# Patient Record
Sex: FEMALE | Race: BLACK OR AFRICAN AMERICAN | Employment: OTHER | URBAN - METROPOLITAN AREA
[De-identification: names, ages, dates, MRNs, and addresses within clinical notes are randomized per-mention and may not be internally consistent; named-entity substitution may affect disease eponyms.]

---

## 2017-01-20 RX ORDER — ATENOLOL 100 MG/1
TABLET ORAL
Qty: 90 TAB | Refills: 0 | Status: SHIPPED | OUTPATIENT
Start: 2017-01-20 | End: 2017-04-23 | Stop reason: SDUPTHER

## 2017-02-05 LAB
ALBUMIN SERPL-MCNC: 4 G/DL (ref 3.6–4.8)
ALBUMIN/CREAT UR: 1517.4 MG/G CREAT (ref 0–30)
ALBUMIN/GLOB SERPL: 1.4 {RATIO} (ref 1.1–2.5)
ALP SERPL-CCNC: 116 IU/L (ref 39–117)
ALT SERPL-CCNC: 18 IU/L (ref 0–32)
AST SERPL-CCNC: 17 IU/L (ref 0–40)
BILIRUB SERPL-MCNC: 0.2 MG/DL (ref 0–1.2)
BUN SERPL-MCNC: 50 MG/DL (ref 8–27)
BUN/CREAT SERPL: 17 (ref 11–26)
CALCIUM SERPL-MCNC: 9.1 MG/DL (ref 8.7–10.3)
CHLORIDE SERPL-SCNC: 102 MMOL/L (ref 96–106)
CHOLEST SERPL-MCNC: 184 MG/DL (ref 100–199)
CO2 SERPL-SCNC: 20 MMOL/L (ref 18–29)
CREAT SERPL-MCNC: 2.98 MG/DL (ref 0.57–1)
CREAT UR-MCNC: 82.6 MG/DL
EST. AVERAGE GLUCOSE BLD GHB EST-MCNC: 206 MG/DL
GLOBULIN SER CALC-MCNC: 2.9 G/DL (ref 1.5–4.5)
GLUCOSE SERPL-MCNC: 170 MG/DL (ref 65–99)
HBA1C MFR BLD: 8.8 % (ref 4.8–5.6)
HDLC SERPL-MCNC: 51 MG/DL
INTERPRETATION, 910389: NORMAL
INTERPRETATION: NORMAL
LDLC SERPL CALC-MCNC: 97 MG/DL (ref 0–99)
Lab: NORMAL
MICROALBUMIN UR-MCNC: 1253.4 UG/ML
PDF IMAGE, 910387: NORMAL
POTASSIUM SERPL-SCNC: 4.7 MMOL/L (ref 3.5–5.2)
PROT SERPL-MCNC: 6.9 G/DL (ref 6–8.5)
SODIUM SERPL-SCNC: 142 MMOL/L (ref 134–144)
TRIGL SERPL-MCNC: 181 MG/DL (ref 0–149)
VLDLC SERPL CALC-MCNC: 36 MG/DL (ref 5–40)

## 2017-02-06 ENCOUNTER — OP HISTORICAL/CONVERTED ENCOUNTER (OUTPATIENT)
Dept: OTHER | Age: 62
End: 2017-02-06

## 2017-02-20 ENCOUNTER — OFFICE VISIT (OUTPATIENT)
Dept: ENDOCRINOLOGY | Age: 62
End: 2017-02-20

## 2017-02-20 VITALS
HEART RATE: 68 BPM | TEMPERATURE: 96.7 F | BODY MASS INDEX: 41.04 KG/M2 | OXYGEN SATURATION: 99 % | RESPIRATION RATE: 20 BRPM | SYSTOLIC BLOOD PRESSURE: 133 MMHG | HEIGHT: 62 IN | WEIGHT: 223 LBS | DIASTOLIC BLOOD PRESSURE: 75 MMHG

## 2017-02-20 DIAGNOSIS — I10 ESSENTIAL HYPERTENSION: ICD-10-CM

## 2017-02-20 DIAGNOSIS — E11.65 TYPE 2 DIABETES MELLITUS WITH HYPERGLYCEMIA, WITH LONG-TERM CURRENT USE OF INSULIN (HCC): Primary | ICD-10-CM

## 2017-02-20 DIAGNOSIS — N28.9 RENAL INSUFFICIENCY: ICD-10-CM

## 2017-02-20 DIAGNOSIS — E78.2 MIXED HYPERLIPIDEMIA: ICD-10-CM

## 2017-02-20 DIAGNOSIS — N18.4 CKD (CHRONIC KIDNEY DISEASE), STAGE 4 (SEVERE): ICD-10-CM

## 2017-02-20 DIAGNOSIS — Z79.4 TYPE 2 DIABETES MELLITUS WITH HYPERGLYCEMIA, WITH LONG-TERM CURRENT USE OF INSULIN (HCC): Primary | ICD-10-CM

## 2017-02-20 DIAGNOSIS — N18.30 CKD (CHRONIC KIDNEY DISEASE) STAGE 3, GFR 30-59 ML/MIN (HCC): ICD-10-CM

## 2017-02-20 PROBLEM — N18.9 CKD (CHRONIC KIDNEY DISEASE): Status: ACTIVE | Noted: 2017-02-20

## 2017-02-20 RX ORDER — INSULIN LISPRO 100 [IU]/ML
INJECTION, SOLUTION INTRAVENOUS; SUBCUTANEOUS
Qty: 45 ML | Refills: 4 | Status: SHIPPED | OUTPATIENT
Start: 2017-02-20 | End: 2017-11-29 | Stop reason: SDUPTHER

## 2017-02-20 RX ORDER — NYSTATIN 100000 [USP'U]/G
POWDER TOPICAL
Refills: 5 | COMMUNITY
Start: 2017-01-27

## 2017-02-20 RX ORDER — INSULIN GLARGINE 100 [IU]/ML
34 INJECTION, SOLUTION SUBCUTANEOUS
Qty: 45 ML | Refills: 4 | Status: SHIPPED | OUTPATIENT
Start: 2017-02-20 | End: 2017-08-25 | Stop reason: SDUPTHER

## 2017-02-20 NOTE — PROGRESS NOTES
HISTORY OF PRESENT ILLNESS   Caitlin Sosa is a 59 y.o. female. HPI   F/u for DM 2 after Nov 2016     Her  got diagnosed with stage 4 lung cancer , and she said it is stable   Weight gain of 4  lbs     She has  been taking the basal bolus regimen   She brought  the log   Not many checks except fasting     Not taking Tolerating tanzeum for cost reasons        Review of Systems   Constitutional: Negative. HENT: Negative. Eyes: Negative for pain and redness. Respiratory: Negative. Cardiovascular: Negative for chest pain, palpitations and leg swelling. Gastrointestinal: Negative. Negative for constipation. Genitourinary: Negative. Musculoskeletal: Negative for myalgias. Skin: Negative. Neurological: none   Endo/Heme/Allergies: Negative. Psychiatric/Behavioral: Negative for depression and memory loss. The patient does not have insomnia. Physical Exam   Constitutional: She is oriented to person, place, and time. She appears well-developed and well-nourished. HENT:   Head: Normocephalic. Eyes: Conjunctivae and extraocular motions are normal. Pupils are equal, round, and reactive to light. Neck: Normal range of motion. Neck supple. No JVD present. No tracheal deviation present. No thyromegaly present. Cardiovascular: Normal rate, regular rhythm and normal heart sounds. No murmur heard. Pulmonary/Chest: Breath sounds normal.   Abdominal: Soft. Bowel sounds are normal.   Musculoskeletal: Normal range of motion. Lymphadenopathy:   She has no cervical adenopathy. Neurological: She is alert and oriented to person, place, and time. She has normal reflexes. Skin: Skin is warm. Psychiatric: She has a normal mood and affect.        Lab Results   Component Value Date/Time    Hemoglobin A1c 8.8 02/03/2017 07:10 AM    Hemoglobin A1c 7.6 10/26/2016 07:21 AM    Hemoglobin A1c 8.7 07/28/2016 07:22 AM    Microalb/Creat ratio (ug/mg creat.) 1517.4 02/03/2017 07:10 AM    LDL, calculated 97 02/03/2017 07:10 AM    Creatinine 2.98 02/03/2017 07:10 AM    Hemoglobin A1c, External 8.7 07/29/2016      Lab Results   Component Value Date/Time    Cholesterol, total 184 02/03/2017 07:10 AM    HDL Cholesterol 51 02/03/2017 07:10 AM    LDL, calculated 97 02/03/2017 07:10 AM    Triglyceride 181 02/03/2017 07:10 AM     Lab Results   Component Value Date/Time    AST (SGOT) 17 02/03/2017 07:10 AM    Alk. phosphatase 116 02/03/2017 07:10 AM     Lab Results   Component Value Date/Time    GFR est AA 19 02/03/2017 07:10 AM    GFR est non-AA 16 02/03/2017 07:10 AM    Creatinine 2.98 02/03/2017 07:10 AM    BUN 50 02/03/2017 07:10 AM    Sodium 142 02/03/2017 07:10 AM    Potassium 4.7 02/03/2017 07:10 AM    Chloride 102 02/03/2017 07:10 AM    CO2 20 02/03/2017 07:10 AM          ASSESSMENT and PLAN     1. DM 2 uncontrolled-:      A1C is  8.8 %   From feb 2017  Compared to    7.6 %     From  Nov 2016 compared to  8.7 %    From aug 2016  Compared to  7.9 %    From April 2016  Compared to   7.7 %    From jan 2016  Compared to   7.3 %       From nov 2015 corrected to     7.8 % From  ,    8.2 %    From July 2015   ( finger stick check is 7.6 ) compared to   8.4 %   From feb 2015 compared to   9.8 %   From dec 2014 compared to  9.1 %  From oct 2014 compared to    8 %  from May 2014 compared to 7.5 % from march 2014 compared to 9.8 % from jan 2014 compared to over 11 % from hospital ( going by her )    LOST CONTROL   S/p gastric banding   Off tradjenta 5 mg a day for cost      Because of cost reasons , she is not able to take  tanzeum  50 mg a week   Explained the need for discipline and more checks     Continue  on basal bolus regimen   Checking  Sugars, so far only fastings   She is told to consider afrezza as she is not compliant with her humalog shots   She did nto like that option        2. HTN :  well  controlled,  :  on lotrel , Hydralazine , and atenolol.        3. CKD : stage 4  f/u with Dr. Laisha Wiggins 4. Elevated Liver enzymes- fluctuating    5. Obesity   Body mass index is 40.79 kg/(m^2).   She cannot afford incretins at this time, so tanzeum is optional       She is admonished as to how she has to increase checks and take insulin correctly    > 50 % of time is spent on counseling     F/u in 6 weeks with log

## 2017-02-20 NOTE — PROGRESS NOTES
Wt Readings from Last 3 Encounters:   02/20/17 223 lb (101.2 kg)   11/18/16 219 lb (99.3 kg)   08/09/16 227 lb (103 kg)     Temp Readings from Last 3 Encounters:   02/20/17 96.7 °F (35.9 °C) (Oral)   11/18/16 96.5 °F (35.8 °C) (Oral)   08/09/16 97.1 °F (36.2 °C) (Oral)     BP Readings from Last 3 Encounters:   02/20/17 133/75   11/18/16 136/72   08/09/16 168/77     Pulse Readings from Last 3 Encounters:   02/20/17 68   11/18/16 (!) 57   08/09/16 66     Lab Results   Component Value Date/Time    Hemoglobin A1c 8.8 02/03/2017 07:10 AM    Hemoglobin A1c (POC) 7.3 11/05/2015 02:09 PM    Hemoglobin A1c, External 8.7 07/29/2016     Last Podiatry March 2016  Last Eye exam Oct 2016

## 2017-02-20 NOTE — PATIENT INSTRUCTIONS
Do not skip meals  Do not eat in between meals    Reduce carbs- pasta, rice, potatoes, bread   Do not drink juices or sodas  Donot eat peanut butter     Do not eat sugar free cookies and cakes   Do not eat peaches, grapes, mangoes, pine apples, oranges,  Tangerines       tanzeum   50   mg once a week ( optional by cost )    Check blood sugars immediately before each meal and at bedtime     lantus  34 units  At night     humalog clear  7 units with b-fast,  lunch and dinner       Add  humalog clear insulin as follows  :     151-200 mg  2 units     201- 250 mg 4 units     301- 350 mg 6 units     301-350 mg  8 units    351-400 mg  10 units    401-450 mg  12 units    451-500 mg 14 units       Less than 70 mg- Do not take insulin

## 2017-02-20 NOTE — MR AVS SNAPSHOT
Visit Information Date & Time Provider Department Dept. Phone Encounter #  
 2/20/2017  9:15 AM Priscilla Spicer MD [facility] [phone] [id number] Follow-up Instructions Return in about 4 weeks (around 3/20/2017). Upcoming Health Maintenance Date Due Hepatitis C Screening 1955 Pneumococcal 19-64 Highest Risk (1 of 3 - PCV13) 6/19/1972 DTaP/Tdap/Td series (1 - Tdap) 6/19/1974 PAP AKA CERVICAL CYTOLOGY 6/19/1974 BREAST CANCER SCRN MAMMOGRAM 6/19/2003 FOBT Q 1 YEAR AGE 50-75 6/19/2003 ZOSTER VACCINE AGE 60> 6/19/2013 FOOT EXAM Q1 3/12/2015 EYE EXAM RETINAL OR DILATED Q1 10/6/2015 HEMOGLOBIN A1C Q6M 8/3/2017 MICROALBUMIN Q1 2/3/2018 LIPID PANEL Q1 2/3/2018 Allergies as of 2/20/2017  Review Complete On: 2/20/2017 By: Priscilla Spicer MD  
 No Known Allergies Current Immunizations  Never Reviewed No immunizations on file. Not reviewed this visit You Were Diagnosed With   
  
 Codes Comments Type 2 diabetes mellitus with hyperglycemia, with long-term current use of insulin (HCC)    -  Primary ICD-10-CM: E11.65, Z79.4 ICD-9-CM: 250.00, 790.29, V58.67 Renal insufficiency     ICD-10-CM: N28.9 ICD-9-CM: 593.9 Essential hypertension     ICD-10-CM: I10 
ICD-9-CM: 401.9 CKD (chronic kidney disease), stage 4 (severe) (HCC)     ICD-10-CM: N18.4 ICD-9-CM: 029. 4 Vitals BP Pulse Temp Resp Height(growth percentile) Weight(growth percentile) 133/75 68 96.7 °F (35.9 °C) (Oral) 20 5' 2\" (1.575 m) 223 lb (101.2 kg) SpO2 BMI OB Status Smoking Status 99% 40.79 kg/m2 Postmenopausal Never Smoker BMI and BSA Data Body Mass Index Body Surface Area 40.79 kg/m 2 2.1 m 2 Preferred Pharmacy Pharmacy Name Phone [address] [facility], [facility] [address] ([phone] Your Updated Medication List  
  
 This list is accurate as of: 2/20/17  9:49 AM.  Always use your most recent med list.  
  
  
  
  
 albiglutide 50 mg/0.5 mL injector pen Commonly known as:  TANZEUM  
50 mg by SubCUTAneous route every seven (7) days. STOP 30 MG DOSE. To use with savings card and voucher  
  
 amLODIPine-benazepril 10-40 mg per capsule Commonly known as:  Mihai Dubin Take 1 Cap by mouth daily. aspirin delayed-release 81 mg tablet Take  by mouth daily. atenolol 100 mg tablet Commonly known as:  TENORMIN  
TAKE 1 TABLET BY MOUTH DAILY  
  
 calcitRIOL 0.25 mcg capsule Commonly known as:  ROCALTROL TK 1 C PO D  
  
 CLARITIN 10 mg tablet Generic drug:  loratadine Take 10 mg by mouth daily. CRESTOR 10 mg tablet Generic drug:  rosuvastatin TAKE 1 TABLET NIGHTLY  
  
 furosemide 40 mg tablet Commonly known as:  LASIX Take 40 mg by mouth every other day. glucose blood VI test strips strip Commonly known as:  ONETOUCH ULTRA TEST Test 4 times daily. DX CODE 250.00  
  
 hydrALAZINE 25 mg tablet Commonly known as:  APRESOLINE Take 1 Tab by mouth three (3) times daily. * insulin glargine 100 unit/mL (3 mL) pen Commonly known as:  LANTUS SOLOSTAR  
sample * insulin glargine 100 unit/mL (3 mL) pen Commonly known as:  LANTUS SOLOSTAR  
34 Units by SubCUTAneous route nightly. insulin lispro 100 unit/mL kwikpen Commonly known as:  HUMALOG Inject 7 units with breakfast, lunch, and dinner. Plus sliding scale. STOP MIXED INSULINS Insulin Needles (Disposable) 32 gauge x 5/32\" Ndle Commonly known as:  Mirian Pen Needle Use 4 times daily. Dx code E11.65  
  
 insulin syringe-needle U-100 1 mL 31 gauge x 15/64\" Syrg Commonly known as:  BD INSULIN SYRINGE ULTRA-FINE  
1 Syringe by Does Not Apply route daily. Use once daily  
  
 isosorbide mononitrate ER 30 mg tablet Commonly known as:  IMDUR Take 60 mg by mouth two (2) times a day. Lancets Misc Commonly known as: One Touch Karron Feller Test 4 times daily. DX CODE 250.00 NYSTOP powder Generic drug:  nystatin CHANDRAKANT UTD BID EXTERNALLY  
  
 ONE-A-DAY WOMENS FORMULA PO Take  by mouth. VITAMIN D2 PO Take  by mouth. * Notice: This list has 2 medication(s) that are the same as other medications prescribed for you. Read the directions carefully, and ask your doctor or other care provider to review them with you. Follow-up Instructions Return in about 4 weeks (around 3/20/2017). Patient Instructions Do not skip meals Do not eat in between meals Reduce carbs- pasta, rice, potatoes, bread Do not drink juices or sodas Donot eat peanut butter Do not eat sugar free cookies and cakes Do not eat peaches, grapes, mangoes, pine apples, oranges,  Tangerines  
 
 
tanzeum   50   mg once a week ( optional by cost ) Check blood sugars immediately before each meal and at bedtime 
 
 lantus  34 units  At night  
 
humalog clear  7 units with b-fast,  lunch and dinner Add  humalog clear insulin as follows  :  
 
151-200 mg  2 units 201- 250 mg 4 units 301- 350 mg 6 units 301-350 mg  8 units 351-400 mg  10 units 401-450 mg  12 units 451-500 mg 14 units Less than 70 mg- Do not take insulin Introducing [facility]! Dear Alivia Saenz: Thank you for requesting a [employer] account. Our records indicate that you already have an active [employer] account. You can access your account anytime at [email or web address] Did you know that you can access your hospital and ER discharge instructions at any time in [employer]? You can also review all of your test results from your hospital stay or ER visit. Additional Information If you have questions, please visit the Frequently Asked Questions section of the [employer] website at [email or web address] Remember, MyChart is NOT to be used for urgent needs. For medical emergencies, dial 911. Now available from your iPhone and Android! Please provide this summary of care documentation to your next provider. Your primary care clinician is listed as Carmella Mccullough. If you have any questions after today's visit, please call [phone].

## 2017-03-20 ENCOUNTER — OFFICE VISIT (OUTPATIENT)
Dept: ENDOCRINOLOGY | Age: 62
End: 2017-03-20

## 2017-03-20 VITALS
TEMPERATURE: 97.1 F | SYSTOLIC BLOOD PRESSURE: 184 MMHG | WEIGHT: 224 LBS | HEIGHT: 62 IN | BODY MASS INDEX: 41.22 KG/M2 | OXYGEN SATURATION: 99 % | HEART RATE: 70 BPM | RESPIRATION RATE: 20 BRPM | DIASTOLIC BLOOD PRESSURE: 85 MMHG

## 2017-03-20 DIAGNOSIS — I10 ESSENTIAL HYPERTENSION: ICD-10-CM

## 2017-03-20 DIAGNOSIS — R80.9 PROTEINURIA: ICD-10-CM

## 2017-03-20 DIAGNOSIS — N28.9 RENAL INSUFFICIENCY: ICD-10-CM

## 2017-03-20 NOTE — PATIENT INSTRUCTIONS
Do not skip meals  Do not eat in between meals    Reduce carbs- pasta, rice, potatoes, bread   Do not drink juices or sodas  Donot eat peanut butter     Do not eat sugar free cookies and cakes   Do not eat peaches, grapes, mangoes, pine apples, oranges,  Tangerines       tanzeum   50   mg once a week ( optional by cost )    Check blood sugars immediately before each meal and at bedtime    Increase  lantus  36 units  At night     Increase humalog clear 8 units with b-fast,  lunch and dinner       Add  humalog clear insulin as follows  :     151-200 mg  2 units     201- 250 mg 4 units     301- 350 mg 6 units     301-350 mg  8 units    351-400 mg  10 units    401-450 mg  12 units    451-500 mg 14 units       Less than 70 mg- Do not take insulin

## 2017-03-20 NOTE — PROGRESS NOTES
Wt Readings from Last 3 Encounters:   03/20/17 224 lb (101.6 kg)   02/20/17 223 lb (101.2 kg)   11/18/16 219 lb (99.3 kg)     Temp Readings from Last 3 Encounters:   03/20/17 97.1 °F (36.2 °C) (Oral)   02/20/17 96.7 °F (35.9 °C) (Oral)   11/18/16 96.5 °F (35.8 °C) (Oral)     BP Readings from Last 3 Encounters:   03/20/17 184/85   02/20/17 133/75   11/18/16 136/72     Pulse Readings from Last 3 Encounters:   03/20/17 70   02/20/17 68   11/18/16 (!) 57     Lab Results   Component Value Date/Time    Hemoglobin A1c 8.8 02/03/2017 07:10 AM    Hemoglobin A1c (POC) 7.3 11/05/2015 02:09 PM    Hemoglobin A1c, External 8.7 07/29/2016     Last Podiatry March 2016  Last Eye exam Oct 2016

## 2017-03-20 NOTE — PROGRESS NOTES
HISTORY OF PRESENT ILLNESS   Caitlin Childs is a 59 y.o. female. HPI   F/u for DM 2 after feb 20 2016     Her  got diagnosed with stage 4 lung cancer , and she said it is stable   Weight gain of 4  lbs     She has  been taking the basal bolus regimen   She brought  the log   Not many checks except fasting     Not taking Tolerating tanzeum for cost reasons        Review of Systems   Constitutional: Negative. HENT: Negative. Eyes: Negative for pain and redness. Respiratory: Negative. Cardiovascular: Negative for chest pain, palpitations and leg swelling. Gastrointestinal: Negative. Negative for constipation. Genitourinary: Negative. Musculoskeletal: Negative for myalgias. Skin: Negative. Neurological: none   Endo/Heme/Allergies: Negative. Psychiatric/Behavioral: Negative for depression and memory loss. The patient does not have insomnia. Physical Exam   Constitutional: She is oriented to person, place, and time. She appears well-developed and well-nourished. HENT:   Head: Normocephalic. Eyes: Conjunctivae and extraocular motions are normal. Pupils are equal, round, and reactive to light. Neck: Normal range of motion. Neck supple. No JVD present. No tracheal deviation present. No thyromegaly present. Cardiovascular: Normal rate, regular rhythm and normal heart sounds. No murmur heard. Pulmonary/Chest: Breath sounds normal.   Abdominal: Soft. Bowel sounds are normal.   Musculoskeletal: Normal range of motion. Lymphadenopathy:   She has no cervical adenopathy. Neurological: She is alert and oriented to person, place, and time. She has normal reflexes. Skin: Skin is warm. Psychiatric: She has a normal mood and affect.        Lab Results   Component Value Date/Time    Hemoglobin A1c 8.8 02/03/2017 07:10 AM    Hemoglobin A1c 7.6 10/26/2016 07:21 AM    Hemoglobin A1c 8.7 07/28/2016 07:22 AM    Microalb/Creat ratio (ug/mg creat.) 1517.4 02/03/2017 07:10 AM    LDL, calculated 97 02/03/2017 07:10 AM    Creatinine 2.98 02/03/2017 07:10 AM    Hemoglobin A1c, External 8.7 07/29/2016      Lab Results   Component Value Date/Time    Cholesterol, total 184 02/03/2017 07:10 AM    HDL Cholesterol 51 02/03/2017 07:10 AM    LDL, calculated 97 02/03/2017 07:10 AM    Triglyceride 181 02/03/2017 07:10 AM     Lab Results   Component Value Date/Time    AST (SGOT) 17 02/03/2017 07:10 AM    Alk. phosphatase 116 02/03/2017 07:10 AM     Lab Results   Component Value Date/Time    GFR est AA 19 02/03/2017 07:10 AM    GFR est non-AA 16 02/03/2017 07:10 AM    Creatinine 2.98 02/03/2017 07:10 AM    BUN 50 02/03/2017 07:10 AM    Sodium 142 02/03/2017 07:10 AM    Potassium 4.7 02/03/2017 07:10 AM    Chloride 102 02/03/2017 07:10 AM    CO2 20 02/03/2017 07:10 AM          ASSESSMENT and PLAN     1. DM 2 uncontrolled-:      A1C is  8.8 %   From feb 2017  Compared to    7.6 %     From  Nov 2016 compared to  8.7 %    From aug 2016  Compared to  7.9 %    From April 2016  Compared to   7.7 %    From jan 2016  Compared to   7.3 %       From nov 2015 corrected to     7.8 % From  ,    8.2 %    From July 2015   ( finger stick check is 7.6 ) compared to   8.4 %   From feb 2015 compared to   9.8 %   From dec 2014 compared to  9.1 %  From oct 2014 compared to    8 %  from May 2014 compared to 7.5 % from march 2014 compared to 9.8 % from jan 2014 compared to over 11 % from hospital ( going by her )    Hoping to get better control on basal bolus   She has some high sugars, meter not timed       S/p gastric banding   Off tradjenta 5 mg a day for cost      Because of cost reasons , she is not able to take  tanzeum  50 mg a week   Explained the need for discipline and more checks     Continue  on basal bolus regimen   Checking  Sugars, improved       She is told to consider afrezza as she is not compliant with her humalog shots   She did nto like that option        2.  HTN :  Not well  controlled,  :  on lotrel , Hydralazine , and atenolol. 3. CKD : stage 4  f/u with Dr. Laura Prado     4. Elevated Liver enzymes- fluctuating    5. Obesity   Body mass index is 40.97 kg/(m^2).   She cannot afford incretins at this time, so tanzeum is optional       She is admonished as to how she has to increase checks and take insulin correctly    > 50 % of time is spent on counseling     F/u in 6 weeks with log and labs   Might need carb training

## 2017-03-20 NOTE — MR AVS SNAPSHOT
Visit Information Date & Time Provider Department Dept. Phone Encounter #  
 3/20/2017  3:15 PM Derrick Peterson MD [facility] Endocr[facility] [phone] [id number] Follow-up Instructions Return in about 7 weeks (around 5/8/2017). Upcoming Health Maintenance Date Due Hepatitis C Screening 1955 Pneumococcal 19-64 Highest Risk (1 of 3 - PCV13) 6/19/1972 DTaP/Tdap/Td series (1 - Tdap) 6/19/1974 PAP AKA CERVICAL CYTOLOGY 6/19/1974 BREAST CANCER SCRN MAMMOGRAM 6/19/2003 FOBT Q 1 YEAR AGE 50-75 6/19/2003 ZOSTER VACCINE AGE 60> 6/19/2013 FOOT EXAM Q1 3/12/2015 EYE EXAM RETINAL OR DILATED Q1 10/6/2015 HEMOGLOBIN A1C Q6M 8/3/2017 MICROALBUMIN Q1 2/3/2018 LIPID PANEL Q1 2/3/2018 Allergies as of 3/20/2017  Review Complete On: 3/20/2017 By: Daphne Cardenas LPN No Known Allergies Current Immunizations  Never Reviewed No immunizations on file. Not reviewed this visit You Were Diagnosed With   
  
 Codes Comments Uncontrolled type 2 diabetes mellitus with stage 4 chronic kidney disease, with long-term current use of insulin (HCC)    -  Primary ICD-10-CM: E11.22, E11.65, N18.4, Z79.4 ICD-9-CM: 250.52, 585.4, V58.67 Proteinuria     ICD-10-CM: R80.9 ICD-9-CM: 791.0 Renal insufficiency     ICD-10-CM: N28.9 ICD-9-CM: 593.9 Essential hypertension     ICD-10-CM: I10 
ICD-9-CM: 401.9 Vitals BP Pulse Temp Resp Height(growth percentile) Weight(growth percentile) 184/85 70 97.1 °F (36.2 °C) (Oral) 20 5' 2\" (1.575 m) 224 lb (101.6 kg) SpO2 BMI OB Status Smoking Status 99% 40.97 kg/m2 Postmenopausal Never Smoker BMI and BSA Data Body Mass Index Body Surface Area 40.97 kg/m 2 2.11 m 2 Preferred Pharmacy Pharmacy Name Phone  [address] [phone] Your Updated Medication List  
  
   
 This list is accurate as of: 3/20/17  4:13 PM.  Always use your most recent med list.  
  
  
  
  
 albiglutide 50 mg/0.5 mL injector pen Commonly known as:  TANZEUM  
50 mg by SubCUTAneous route every seven (7) days. STOP 30 MG DOSE. To use with savings card and voucher  
  
 amLODIPine-benazepril 10-40 mg per capsule Commonly known as:  Orlando Holster Take 1 Cap by mouth daily. aspirin delayed-release 81 mg tablet Take  by mouth daily. atenolol 100 mg tablet Commonly known as:  TENORMIN  
TAKE 1 TABLET BY MOUTH DAILY  
  
 calcitRIOL 0.25 mcg capsule Commonly known as:  ROCALTROL TK 1 C PO D  
  
 CLARITIN 10 mg tablet Generic drug:  loratadine Take 10 mg by mouth daily. CRESTOR 10 mg tablet Generic drug:  rosuvastatin TAKE 1 TABLET NIGHTLY  
  
 furosemide 40 mg tablet Commonly known as:  LASIX Take 40 mg by mouth every other day. glucose blood VI test strips strip Commonly known as:  ONETOUCH ULTRA TEST Test 4 times daily. DX CODE 250.00  
  
 hydrALAZINE 25 mg tablet Commonly known as:  APRESOLINE Take 1 Tab by mouth three (3) times daily. * insulin glargine 100 unit/mL (3 mL) pen Commonly known as:  LANTUS SOLOSTAR  
sample * insulin glargine 100 unit/mL (3 mL) pen Commonly known as:  LANTUS SOLOSTAR  
34 Units by SubCUTAneous route nightly. insulin lispro 100 unit/mL kwikpen Commonly known as:  HUMALOG Inject 7 units with breakfast, lunch, and dinner. Plus sliding scale. STOP MIXED INSULINS Insulin Needles (Disposable) 32 gauge x 5/32\" Ndle Commonly known as:  Mirian Pen Needle Use 4 times daily. Dx code E11.65  
  
 insulin syringe-needle U-100 1 mL 31 gauge x 15/64\" Syrg Commonly known as:  BD INSULIN SYRINGE ULTRA-FINE  
1 Syringe by Does Not Apply route daily. Use once daily  
  
 isosorbide mononitrate ER 30 mg tablet Commonly known as:  IMDUR Take 60 mg by mouth two (2) times a day. Lancets Misc Commonly known as: One Touch Leslie Llanos Test 4 times daily. DX CODE 250.00 NYSTOP powder Generic drug:  nystatin CHANDRAKANT UTD BID EXTERNALLY  
  
 ONE-A-DAY WOMENS FORMULA PO Take  by mouth. VITAMIN D2 PO Take  by mouth. * Notice: This list has 2 medication(s) that are the same as other medications prescribed for you. Read the directions carefully, and ask your doctor or other care provider to review them with you. Follow-up Instructions Return in about 7 weeks (around 5/8/2017). Patient Instructions Do not skip meals Do not eat in between meals Reduce carbs- pasta, rice, potatoes, bread Do not drink juices or sodas Donot eat peanut butter Do not eat sugar free cookies and cakes Do not eat peaches, grapes, mangoes, pine apples, oranges,  Tangerines  
 
 
tanzeum   50   mg once a week ( optional by cost ) Check blood sugars immediately before each meal and at bedtime Increase  lantus  36 units  At night Increase humalog clear 8 units with b-fast,  lunch and dinner Add  humalog clear insulin as follows  :  
 
151-200 mg  2 units 201- 250 mg 4 units 301- 350 mg 6 units 301-350 mg  8 units 351-400 mg  10 units 401-450 mg  12 units 451-500 mg 14 units Less than 70 mg- Do not take insulin Introducing [facility]! Dear Valeen Hammans: Thank you for requesting a [employer] account. Our records indicate that you already have an active [employer] account. You can access your account anytime at [email or web address] Did you know that you can access your hospital and ER discharge instructions at any time in [employer]? You can also review all of your test results from your hospital stay or ER visit. Additional Information If you have questions, please visit the Frequently Asked Questions section of the [employer] website at [email or web address] Remember, MyChart is NOT to be used for urgent needs. For medical emergencies, dial 911. Now available from your iPhone and Android! Please provide this summary of care documentation to your next provider. Your primary care clinician is listed as Hansa Frey. If you have any questions after today's visit, please call [phone].

## 2017-05-02 ENCOUNTER — IP HISTORICAL/CONVERTED ENCOUNTER (OUTPATIENT)
Dept: OTHER | Age: 62
End: 2017-05-02

## 2017-07-18 ENCOUNTER — TELEPHONE (OUTPATIENT)
Dept: ENDOCRINOLOGY | Age: 62
End: 2017-07-18

## 2017-07-18 NOTE — TELEPHONE ENCOUNTER
Patient called stated they sent a my chart message needing a [facility] lab slip mailed but still has not received it. Can you please mail or fax the slip so that she may get her labs drawn.  Thank you

## 2017-08-18 LAB
ALBUMIN SERPL-MCNC: 4.1 G/DL (ref 3.6–4.8)
ALBUMIN/CREAT UR: 737.6 MG/G CREAT (ref 0–30)
ALBUMIN/GLOB SERPL: 1.5 {RATIO} (ref 1.2–2.2)
ALP SERPL-CCNC: 84 IU/L (ref 39–117)
ALT SERPL-CCNC: 16 IU/L (ref 0–32)
AST SERPL-CCNC: 26 IU/L (ref 0–40)
BILIRUB SERPL-MCNC: 0.2 MG/DL (ref 0–1.2)
BUN SERPL-MCNC: 54 MG/DL (ref 8–27)
BUN/CREAT SERPL: 17 (ref 12–28)
CALCIUM SERPL-MCNC: 9.6 MG/DL (ref 8.7–10.3)
CHLORIDE SERPL-SCNC: 102 MMOL/L (ref 96–106)
CHOLEST SERPL-MCNC: 157 MG/DL (ref 100–199)
CO2 SERPL-SCNC: 20 MMOL/L (ref 18–29)
CREAT SERPL-MCNC: 3.23 MG/DL (ref 0.57–1)
CREAT UR-MCNC: 49.7 MG/DL
EST. AVERAGE GLUCOSE BLD GHB EST-MCNC: 166 MG/DL
GLOBULIN SER CALC-MCNC: 2.8 G/DL (ref 1.5–4.5)
GLUCOSE SERPL-MCNC: 120 MG/DL (ref 65–99)
HBA1C MFR BLD: 7.4 % (ref 4.8–5.6)
HDLC SERPL-MCNC: 46 MG/DL
INTERPRETATION, 910389: NORMAL
INTERPRETATION: NORMAL
LDLC SERPL CALC-MCNC: 81 MG/DL (ref 0–99)
Lab: NORMAL
MICROALBUMIN UR-MCNC: 366.6 UG/ML
PDF IMAGE, 910387: NORMAL
POTASSIUM SERPL-SCNC: 4 MMOL/L (ref 3.5–5.2)
PROT SERPL-MCNC: 6.9 G/DL (ref 6–8.5)
SODIUM SERPL-SCNC: 141 MMOL/L (ref 134–144)
TRIGL SERPL-MCNC: 148 MG/DL (ref 0–149)
VLDLC SERPL CALC-MCNC: 30 MG/DL (ref 5–40)

## 2017-08-25 ENCOUNTER — OFFICE VISIT (OUTPATIENT)
Dept: ENDOCRINOLOGY | Age: 62
End: 2017-08-25

## 2017-08-25 VITALS
TEMPERATURE: 96.5 F | HEIGHT: 62 IN | DIASTOLIC BLOOD PRESSURE: 67 MMHG | RESPIRATION RATE: 16 BRPM | OXYGEN SATURATION: 98 % | SYSTOLIC BLOOD PRESSURE: 131 MMHG | BODY MASS INDEX: 41.51 KG/M2 | HEART RATE: 67 BPM | WEIGHT: 225.6 LBS

## 2017-08-25 DIAGNOSIS — I10 ESSENTIAL HYPERTENSION: ICD-10-CM

## 2017-08-25 DIAGNOSIS — N18.4 TYPE 2 DIABETES MELLITUS WITH STAGE 4 CHRONIC KIDNEY DISEASE, WITH LONG-TERM CURRENT USE OF INSULIN (HCC): Primary | ICD-10-CM

## 2017-08-25 DIAGNOSIS — N18.30 CKD (CHRONIC KIDNEY DISEASE) STAGE 3, GFR 30-59 ML/MIN (HCC): ICD-10-CM

## 2017-08-25 DIAGNOSIS — N28.9 RENAL INSUFFICIENCY: ICD-10-CM

## 2017-08-25 DIAGNOSIS — Z79.4 UNCONTROLLED TYPE 2 DIABETES MELLITUS WITH HYPERGLYCEMIA, WITH LONG-TERM CURRENT USE OF INSULIN (HCC): ICD-10-CM

## 2017-08-25 DIAGNOSIS — Z79.4 TYPE 2 DIABETES MELLITUS WITH STAGE 4 CHRONIC KIDNEY DISEASE, WITH LONG-TERM CURRENT USE OF INSULIN (HCC): Primary | ICD-10-CM

## 2017-08-25 DIAGNOSIS — E66.01 MORBID OBESITY, UNSPECIFIED OBESITY TYPE (HCC): ICD-10-CM

## 2017-08-25 DIAGNOSIS — E11.65 UNCONTROLLED TYPE 2 DIABETES MELLITUS WITH HYPERGLYCEMIA, WITH LONG-TERM CURRENT USE OF INSULIN (HCC): ICD-10-CM

## 2017-08-25 DIAGNOSIS — E11.22 TYPE 2 DIABETES MELLITUS WITH STAGE 4 CHRONIC KIDNEY DISEASE, WITH LONG-TERM CURRENT USE OF INSULIN (HCC): Primary | ICD-10-CM

## 2017-08-25 DIAGNOSIS — N18.4 CKD (CHRONIC KIDNEY DISEASE), STAGE 4 (SEVERE): ICD-10-CM

## 2017-08-25 RX ORDER — INSULIN GLARGINE 100 [IU]/ML
40 INJECTION, SOLUTION SUBCUTANEOUS
Qty: 45 ML | Refills: 4 | Status: SHIPPED | OUTPATIENT
Start: 2017-08-25 | End: 2018-03-29 | Stop reason: SDUPTHER

## 2017-08-25 NOTE — PROGRESS NOTES
HISTORY OF PRESENT ILLNESS   Caitlin Acosta is a 57 y.o. female. HPI   F/u for DM 2 after March 20 2017    Her  got diagnosed with stage 4 lung cancer , and she said it is stable   Weight gain of 1  lbs     She has  been taking the basal bolus regimen   She brought  the log   Not many checks except fasting       Hospitalized in May for renal issues     Not taking Tolerating tanzeum for cost reasons        Review of Systems   Constitutional: Negative. HENT: Negative. Eyes: Negative for pain and redness. Respiratory: Negative. Cardiovascular: Negative for chest pain, palpitations and leg swelling. Gastrointestinal: Negative. Negative for constipation. Genitourinary: Negative. Musculoskeletal: Negative for myalgias. Skin: Negative. Neurological: none   Endo/Heme/Allergies: Negative. Psychiatric/Behavioral: Negative for depression and memory loss. The patient does not have insomnia. Physical Exam   Constitutional: She is oriented to person, place, and time. She appears well-developed and well-nourished. HENT:   Head: Normocephalic. Eyes: Conjunctivae and extraocular motions are normal. Pupils are equal, round, and reactive to light. Neck: Normal range of motion. Neck supple. No JVD present. No tracheal deviation present. No thyromegaly present. Cardiovascular: Normal rate, regular rhythm and normal heart sounds. No murmur heard. Pulmonary/Chest: Breath sounds normal.   Abdominal: Soft. Bowel sounds are normal.   Musculoskeletal: Normal range of motion. Lymphadenopathy:   She has no cervical adenopathy. Neurological: She is alert and oriented to person, place, and time. She has normal reflexes. Skin: Skin is warm. Psychiatric: She has a normal mood and affect.        Lab Results   Component Value Date/Time    Hemoglobin A1c 7.4 08/17/2017 07:23 AM    Hemoglobin A1c 8.8 02/03/2017 07:10 AM    Hemoglobin A1c 7.6 10/26/2016 07:21 AM    Microalb/Creat ratio (ug/mg creat.) 737.6 08/17/2017 07:23 AM    LDL, calculated 81 08/17/2017 07:23 AM    Creatinine 3.23 08/17/2017 07:23 AM    Hemoglobin A1c, External 8.7 07/29/2016      Lab Results   Component Value Date/Time    Cholesterol, total 157 08/17/2017 07:23 AM    HDL Cholesterol 46 08/17/2017 07:23 AM    LDL, calculated 81 08/17/2017 07:23 AM    Triglyceride 148 08/17/2017 07:23 AM     Lab Results   Component Value Date/Time    AST (SGOT) 26 08/17/2017 07:23 AM    Alk. phosphatase 84 08/17/2017 07:23 AM     Lab Results   Component Value Date/Time    GFR est AA 17 08/17/2017 07:23 AM    GFR est non-AA 14 08/17/2017 07:23 AM    Creatinine 3.23 08/17/2017 07:23 AM    BUN 54 08/17/2017 07:23 AM    Sodium 141 08/17/2017 07:23 AM    Potassium 4.0 08/17/2017 07:23 AM    Chloride 102 08/17/2017 07:23 AM    CO2 20 08/17/2017 07:23 AM          ASSESSMENT and PLAN     1. DM 2 uncontrolled-:      A1C is   7.4 %   From     Aug 2017     Compared to  8.8 %   From feb 2017  Compared to    7.6 %     From  Nov 2016 compared to  8.7 %    From aug 2016  Compared to  7.9 %    From April 2016  Compared to   7.7 %    From jan 2016  Compared to   7.3 %       From nov 2015 corrected to     7.8 % From  ,    8.2 %    From July 2015   ( finger stick check is 7.6 ) compared to   8.4 %   From feb 2015 compared to   9.8 %   From dec 2014 compared to  9.1 %  From oct 2014 compared to    8 %  from May 2014 compared to 7.5 % from march 2014 compared to 9.8 % from jan 2014 compared to over 11 % from hospital ( going by her )    Glad to see some improvement in glycemic control   S/p gastric banding   Off tradjenta 5 mg a day for cost      Because of cost reasons , she is not able to take  tanzeum  50 mg a week   Explained the need for discipline and more checks     Continue  on basal bolus regimen   Checking  Sugars, improved         2. HTN :  Not well  controlled,  :  on lotrel , Hydralazine , and atenolol.        3. CKD : stage 4  f/u with Dr. Declan Rogers 4. Elevated Liver enzymes- fluctuating    5. Obesity   Body mass index is 41.26 kg/(m^2).   She cannot afford incretins at this time, so tanzeum is optional           > 50 % of time is spent on counseling     F/u in 12 weeks with log and labs   Might need carb training

## 2017-08-25 NOTE — PATIENT INSTRUCTIONS
Do not skip meals  Do not eat in between meals    Reduce carbs- pasta, rice, potatoes, bread   Do not drink juices or sodas  Donot eat peanut butter     Do not eat sugar free cookies and cakes   Do not eat peaches, grapes, mangoes, pine apples, oranges,  Tangerines       tanzeum   50   mg once a week ( optional by cost )    Check blood sugars immediately before each meal and at bedtime    lantus  40 units  At night     Increase humalog clear 8 units with b-fast,  lunch and dinner       Add  humalog clear insulin as follows  :     151-200 mg  2 units     201- 250 mg 4 units     301- 350 mg 6 units     301-350 mg  8 units    351-400 mg  10 units    401-450 mg  12 units    451-500 mg 14 units       Less than 70 mg- Do not take insulin

## 2017-08-25 NOTE — MR AVS SNAPSHOT
Visit Information Date & Time Provider Department Dept. Phone Encounter #  
 8/25/2017  2:45 PM Roxy Bateman MD [facility] Diabetes & Endocrinology [phone] [id number] Follow-up Instructions Return in about 3 months (around 11/25/2017). Upcoming Health Maintenance Date Due Hepatitis C Screening 1955 Pneumococcal 19-64 Medium Risk (1 of 1 - PPSV23) 6/19/1972 DTaP/Tdap/Td series (1 - Tdap) 6/19/1974 PAP AKA CERVICAL CYTOLOGY 6/19/1974 BREAST CANCER SCRN MAMMOGRAM 6/19/2003 FOBT Q 1 YEAR AGE 50-75 6/19/2003 ZOSTER VACCINE AGE 60> 4/19/2013 FOOT EXAM Q1 3/12/2015 EYE EXAM RETINAL OR DILATED Q1 10/6/2015 INFLUENZA AGE 9 TO ADULT 8/1/2017 HEMOGLOBIN A1C Q6M 2/17/2018 MICROALBUMIN Q1 8/17/2018 LIPID PANEL Q1 8/17/2018 Allergies as of 8/25/2017  Review Complete On: 8/25/2017 By: Roxy Bateman MD  
 No Known Allergies Current Immunizations  Never Reviewed No immunizations on file. Not reviewed this visit You Were Diagnosed With   
  
 Codes Comments Type 2 diabetes mellitus with stage 4 chronic kidney disease, with long-term current use of insulin (HCC)    -  Primary ICD-10-CM: E11.22, N18.4, Z79.4 ICD-9-CM: 250.40, 585.4, V58.67 Morbid obesity, unspecified [facility] type ([facility]     ICD-10-CM: E66.01 
ICD-9-CM: 278.01 Renal insufficiency     ICD-10-CM: N28.9 ICD-9-CM: 593.9 Essential hypertension     ICD-10-CM: I10 
ICD-9-CM: 401.9 Vitals BP Pulse Temp Resp Height(growth percentile) Weight(growth percentile) 131/67 (BP 1 Location: Left arm, BP Patient Position: Sitting) 67 96.5 °F (35.8 °C) (Oral) 16 5' 2\" (1.575 m) 225 lb 9.6 oz (102.3 kg) SpO2 BMI OB Status Smoking Status 98% 41.26 kg/m2 Postmenopausal Never Smoker BMI and BSA Data Body Mass Index Body Surface Area  
 41.26 kg/m 2 2.12 m 2 Preferred Pharmacy Pharmacy Name Phone [address] [facility] [address] [facility] OF [address] [facility] ([address] [phone] Your Updated Medication List  
  
   
This list is accurate as of: 8/25/17  3:37 PM.  Always use your most recent med list.  
  
  
  
  
 albiglutide 50 mg/0.5 mL injector pen Commonly known as:  TANZEUM  
50 mg by SubCUTAneous route every seven (7) days. STOP 30 MG DOSE. To use with savings card and voucher  
  
 amLODIPine-benazepril 10-40 mg per capsule Commonly known as:  LOTREL  
TAKE 1 CAPSULE DAILY  
  
 aspirin delayed-release 81 mg tablet Take  by mouth daily. atenolol 100 mg tablet Commonly known as:  TENORMIN  
TAKE 1 TABLET BY MOUTH DAILY  
  
 calcitRIOL 0.25 mcg capsule Commonly known as:  ROCALTROL TK 1 C PO D  
  
 CLARITIN 10 mg tablet Generic drug:  loratadine Take 10 mg by mouth daily. furosemide 40 mg tablet Commonly known as:  LASIX Take 40 mg by mouth three (3) times daily. glucose blood VI test strips strip Commonly known as:  ONETOUCH ULTRA TEST Test 4 times daily. DX CODE 250.00  
  
 hydrALAZINE 25 mg tablet Commonly known as:  APRESOLINE Take 1 Tab by mouth three (3) times daily. * insulin glargine 100 unit/mL (3 mL) Inpn Commonly known as:  LANTUS,BASAGLAR  
sample * insulin glargine 100 unit/mL (3 mL) Inpn Commonly known as:  LANTUS SOLOSTAR  
34 Units by SubCUTAneous route nightly. insulin lispro 100 unit/mL kwikpen Commonly known as:  HUMALOG Inject 7 units with breakfast, lunch, and dinner. Plus sliding scale. STOP MIXED INSULINS Insulin Needles (Disposable) 32 gauge x 5/32\" Ndle Commonly known as:  Mirian Pen Needle Use 4 times daily. Dx code E11.65  
  
 insulin syringe-needle U-100 1 mL 31 gauge x 15/64\" Syrg Commonly known as:  BD INSULIN SYRINGE ULTRA-FINE  
1 Syringe by Does Not Apply route daily. Use once daily  
  
 isosorbide mononitrate ER 30 mg tablet Commonly known as:  IMDUR Take 60 mg by mouth two (2) times a day. Lancets Misc Commonly known as: One Touch Debbie  Test 4 times daily. DX CODE 250.00 NYSTOP powder Generic drug:  nystatin CHANDRAKANT UTD BID EXTERNALLY  
  
 ONE-A-DAY WOMENS FORMULA PO Take  by mouth. rosuvastatin 10 mg tablet Commonly known as:  CRESTOR  
TAKE 1 TABLET NIGHTLY  
  
 VITAMIN D2 PO Take 5,000 Units by mouth daily. * Notice: This list has 2 medication(s) that are the same as other medications prescribed for you. Read the directions carefully, and ask your doctor or other care provider to review them with you. Follow-up Instructions Return in about 3 months (around 11/25/2017). Patient Instructions Do not skip meals Do not eat in between meals Reduce carbs- pasta, rice, potatoes, bread Do not drink juices or sodas Donot eat peanut butter Do not eat sugar free cookies and cakes Do not eat peaches, grapes, mangoes, pine apples, oranges,  Tangerines  
 
 
tanzeum   50   mg once a week ( optional by cost ) Check blood sugars immediately before each meal and at bedtime 
 
lantus  40 units  At night Increase humalog clear 8 units with b-fast,  lunch and dinner Add  humalog clear insulin as follows  :  
 
151-200 mg  2 units 201- 250 mg 4 units 301- 350 mg 6 units 301-350 mg  8 units 351-400 mg  10 units 401-450 mg  12 units 451-500 mg 14 units Less than 70 mg- Do not take insulin Introducing [facility] & HEALTH SERVICES! Dear Julia Zurita: Thank you for requesting a [employer] account. Our records indicate that you already have an active [employer] account. You can access your account anytime at [email or web address] Gorilla Did you know that you can access your hospital and ER discharge instructions at any time in [employer]? You can also review all of your test results from your hospital stay or ER visit. Additional Information If you have questions, please visit the Frequently Asked Questions section of the [employer] website at [email or web address] Remember, S[employer] is NOT to be used for urgent needs. For medical emergencies, dial 911. Now available from your iPhone and Android! Please provide this summary of care documentation to your next provider. Your primary care clinician is listed as Manfred Holm. If you have any questions after today's visit, please call [phone].

## 2017-11-23 LAB
ALBUMIN SERPL-MCNC: 4.4 G/DL (ref 3.6–4.8)
ALBUMIN/CREAT UR: 859.6 MG/G CREAT (ref 0–30)
ALBUMIN/GLOB SERPL: 1.7 {RATIO} (ref 1.2–2.2)
ALP SERPL-CCNC: 84 IU/L (ref 39–117)
ALT SERPL-CCNC: 14 IU/L (ref 0–32)
AST SERPL-CCNC: 15 IU/L (ref 0–40)
BILIRUB SERPL-MCNC: 0.3 MG/DL (ref 0–1.2)
BUN SERPL-MCNC: 52 MG/DL (ref 8–27)
BUN/CREAT SERPL: 16 (ref 12–28)
CALCIUM SERPL-MCNC: 9.5 MG/DL (ref 8.7–10.3)
CHLORIDE SERPL-SCNC: 102 MMOL/L (ref 96–106)
CHOLEST SERPL-MCNC: 188 MG/DL (ref 100–199)
CO2 SERPL-SCNC: 21 MMOL/L (ref 18–29)
CREAT SERPL-MCNC: 3.17 MG/DL (ref 0.57–1)
CREAT UR-MCNC: 81.5 MG/DL
EST. AVERAGE GLUCOSE BLD GHB EST-MCNC: 169 MG/DL
GFR SERPLBLD CREATININE-BSD FMLA CKD-EPI: 15 ML/MIN/1.73
GFR SERPLBLD CREATININE-BSD FMLA CKD-EPI: 17 ML/MIN/1.73
GLOBULIN SER CALC-MCNC: 2.6 G/DL (ref 1.5–4.5)
GLUCOSE SERPL-MCNC: 93 MG/DL (ref 65–99)
HBA1C MFR BLD: 7.5 % (ref 4.8–5.6)
HDLC SERPL-MCNC: 49 MG/DL
INTERPRETATION, 910389: NORMAL
INTERPRETATION: NORMAL
LDLC SERPL CALC-MCNC: 106 MG/DL (ref 0–99)
Lab: NORMAL
MICROALBUMIN UR-MCNC: 700.6 UG/ML
PDF IMAGE, 910387: NORMAL
POTASSIUM SERPL-SCNC: 3.8 MMOL/L (ref 3.5–5.2)
PROT SERPL-MCNC: 7 G/DL (ref 6–8.5)
SODIUM SERPL-SCNC: 142 MMOL/L (ref 134–144)
TRIGL SERPL-MCNC: 166 MG/DL (ref 0–149)
VLDLC SERPL CALC-MCNC: 33 MG/DL (ref 5–40)

## 2017-11-29 ENCOUNTER — OFFICE VISIT (OUTPATIENT)
Dept: ENDOCRINOLOGY | Age: 62
End: 2017-11-29

## 2017-11-29 VITALS
SYSTOLIC BLOOD PRESSURE: 138 MMHG | DIASTOLIC BLOOD PRESSURE: 61 MMHG | BODY MASS INDEX: 41.3 KG/M2 | RESPIRATION RATE: 16 BRPM | TEMPERATURE: 96.9 F | WEIGHT: 224.4 LBS | HEIGHT: 62 IN | OXYGEN SATURATION: 99 % | HEART RATE: 73 BPM

## 2017-11-29 DIAGNOSIS — E78.2 MIXED HYPERLIPIDEMIA: ICD-10-CM

## 2017-11-29 DIAGNOSIS — I10 ESSENTIAL HYPERTENSION: ICD-10-CM

## 2017-11-29 DIAGNOSIS — E66.01 MORBID OBESITY (HCC): ICD-10-CM

## 2017-11-29 RX ORDER — INSULIN LISPRO 100 [IU]/ML
INJECTION, SOLUTION INTRAVENOUS; SUBCUTANEOUS
Qty: 45 ML | Refills: 4 | Status: SHIPPED | OUTPATIENT
Start: 2017-11-29 | End: 2019-09-02 | Stop reason: SDUPTHER

## 2017-11-29 NOTE — MR AVS SNAPSHOT
Visit Information Date & Time Provider Department Dept. Phone Encounter #  
 11/29/2017  3:30 PM Jenaro Joseph MD [facility] Diabetes & Endocrinology [phone] [id number] Follow-up Instructions Return in about 4 months (around 3/29/2018). Upcoming Health Maintenance Date Due Hepatitis C Screening 1955 Pneumococcal 19-64 Medium Risk (1 of 1 - PPSV23) 6/19/1972 DTaP/Tdap/Td series (1 - Tdap) 6/19/1974 PAP AKA CERVICAL CYTOLOGY 6/19/1974 BREAST CANCER SCRN MAMMOGRAM 6/19/2003 FOBT Q 1 YEAR AGE 50-75 6/19/2003 ZOSTER VACCINE AGE 60> 4/19/2013 FOOT EXAM Q1 3/12/2015 EYE EXAM RETINAL OR DILATED Q1 10/6/2015 Influenza Age 5 to Adult 8/1/2017 HEMOGLOBIN A1C Q6M 5/22/2018 MICROALBUMIN Q1 11/22/2018 LIPID PANEL Q1 11/22/2018 Allergies as of 11/29/2017  Review Complete On: 11/29/2017 By: Jenaro Joseph MD  
 No Known Allergies Current Immunizations  Never Reviewed No immunizations on file. Not reviewed this visit You Were Diagnosed With   
  
 Codes Comments Uncontrolled type 2 diabetes mellitus with stage 4 chronic kidney disease, with long-term current use of insulin (HCC)    -  Primary ICD-10-CM: E11.22, E11.65, N18.4, Z79.4 ICD-9-CM: 250.52, 585.4, V58.67 Essential hypertension     ICD-10-CM: I10 
ICD-9-CM: 401.9 Morbid obesity ([facility]     ICD-10-CM: E66.01 
ICD-9-CM: 278.01 Mixed hyperlipidemia     ICD-10-CM: E78.2 ICD-9-CM: 272.2 Vitals BP Pulse Temp Resp Height(growth percentile) Weight(growth percentile)  
 138/61 (BP 1 Location: Left arm, BP Patient Position: Sitting) 73 96.9 °F (36.1 °C) (Oral) 16 5' 2\" (1.575 m) 224 lb 6.4 oz (101.8 kg) SpO2 BMI OB Status Smoking Status 99% 41.04 kg/m2 Postmenopausal Never Smoker BMI and BSA Data Body Mass Index Body Surface Area 41.04 kg/m 2 2.11 m 2 Preferred Pharmacy Pharmacy Name Phone 310 [facility] [address] [facility] OF [address] [place] [address] ([address] [phone] Your Updated Medication List  
  
   
This list is accurate as of: 11/29/17  3:58 PM.  Always use your most recent med list.  
  
  
  
  
 albiglutide 50 mg/0.5 mL injector pen Commonly known as:  TANZEUM  
50 mg by SubCUTAneous route every seven (7) days. STOP 30 MG DOSE. To use with savings card and voucher  
  
 amLODIPine-benazepril 10-40 mg per capsule Commonly known as:  LOTREL  
TAKE 1 CAPSULE DAILY  
  
 aspirin delayed-release 81 mg tablet Take  by mouth daily. atenolol 100 mg tablet Commonly known as:  TENORMIN  
TAKE 1 TABLET BY MOUTH DAILY  
  
 calcitRIOL 0.25 mcg capsule Commonly known as:  ROCALTROL TK 1 C PO D  
  
 CLARITIN 10 mg tablet Generic drug:  loratadine Take 10 mg by mouth daily. furosemide 40 mg tablet Commonly known as:  LASIX Take 40 mg by mouth three (3) times daily. glucose blood VI test strips strip Commonly known as:  ONETOUCH ULTRA TEST Test 4 times daily. DX CODE E11.65  
  
 hydrALAZINE 25 mg tablet Commonly known as:  APRESOLINE Take 1 Tab by mouth three (3) times daily. * insulin glargine 100 unit/mL (3 mL) Inpn Commonly known as:  LANTUS,BASAGLAR  
sample * insulin glargine 100 unit/mL (3 mL) Inpn Commonly known as:  LANTUS SOLOSTAR  
40 Units by SubCUTAneous route nightly. insulin lispro 100 unit/mL kwikpen Commonly known as:  HUMALOG Inject 7 units with breakfast, lunch, and dinner. Plus sliding scale. STOP MIXED INSULINS Insulin Needles (Disposable) 32 gauge x 5/32\" Ndle Commonly known as:  Mirian Pen Needle Use 4 times daily. Dx code E11.65  
  
 insulin syringe-needle U-100 1 mL 31 gauge x 15/64\" Syrg Commonly known as:  BD INSULIN SYRINGE ULTRA-FINE  
1 Syringe by Does Not Apply route daily. Use once daily  
  
 isosorbide mononitrate ER 30 mg tablet Commonly known as:  IMDUR Take 60 mg by mouth two (2) times a day. Lancets Misc Commonly known as: One Touch Rico Tavares Test 4 times daily. DX CODE 250.00 NYSTOP powder Generic drug:  nystatin CHANDRAKANT UTD BID EXTERNALLY  
  
 ONE-A-DAY WOMENS FORMULA PO Take  by mouth. rosuvastatin 10 mg tablet Commonly known as:  CRESTOR  
TAKE 1 TABLET NIGHTLY  
  
 VITAMIN D2 PO Take 5,000 Units by mouth daily. * Notice: This list has 2 medication(s) that are the same as other medications prescribed for you. Read the directions carefully, and ask your doctor or other care provider to review them with you. Follow-up Instructions Return in about 4 months (around 3/29/2018). Patient Instructions Do not skip meals Do not eat in between meals Reduce carbs- pasta, rice, potatoes, bread Do not drink juices or sodas Donot eat peanut butter Do not eat sugar free cookies and cakes Do not eat peaches, grapes, mangoes, pine apples, oranges,  Tangerines  
 
 
tanzeum   50   mg once a week ( optional by cost ) Check blood sugars immediately before each meal and at bedtime 
 
lantus  40 units  At night  
 
 humalog clear 8 units with b-fast,  lunch and dinner Add  humalog clear insulin as follows  :  
 
151-200 mg  2 units 201- 250 mg 4 units 301- 350 mg 6 units 301-350 mg  8 units 351-400 mg  10 units 401-450 mg  12 units 451-500 mg 14 units Less than 70 mg- Do not take insulin Introducing [facility] & HEALTH SERVICES! Dear Elicia Bell: Thank you for requesting a Curious Hat account. Our records indicate that you already have an active Curious Hat account. You can access your account anytime at [email or web address] - Social Platform. [email or web address]/[email or web address] - Social Platform Did you know that you can access your hospital and ER discharge instructions at any time in Curious Hat? You can also review all of your test results from your hospital stay or ER visit. Additional Information If you have questions, please visit the Frequently Asked Questions section of the [employer] website at [email or web address] Remember, [employer] is NOT to be used for urgent needs. For medical emergencies, dial 911. Now available from your iPhone and Android! Please provide this summary of care documentation to your next provider. Your primary care clinician is listed as Carmella Mccullough. If you have any questions after today's visit, please call [phone].

## 2017-11-29 NOTE — PATIENT INSTRUCTIONS
Do not skip meals  Do not eat in between meals    Reduce carbs- pasta, rice, potatoes, bread   Do not drink juices or sodas  Donot eat peanut butter     Do not eat sugar free cookies and cakes   Do not eat peaches, grapes, mangoes, pine apples, oranges,  Tangerines       tanzeum   50   mg once a week ( optional by cost )    Check blood sugars immediately before each meal and at bedtime    lantus  40 units  At night      humalog clear 8 units with b-fast,  lunch and dinner       Add  humalog clear insulin as follows  :     151-200 mg  2 units     201- 250 mg 4 units     301- 350 mg 6 units     301-350 mg  8 units    351-400 mg  10 units    401-450 mg  12 units    451-500 mg 14 units       Less than 70 mg- Do not take insulin

## 2017-11-29 NOTE — PROGRESS NOTES
HISTORY OF PRESENT ILLNESS   Caitlin Aguero is a 57 y.o. female. HPI   F/u for DM 2 after  August 2017    Her  got diagnosed with stage 4 lung cancer  Weight loss of 1  lbs   Her  is hospitalized twice in between and she is busy with him    Not taking Tolerating tanzeum for cost reasons  She has  been taking the basal bolus regimen   She brought  the log   Not many checks       Review of Systems   Constitutional: Negative. HENT: Negative. Eyes: Negative for pain and redness. Respiratory: Negative. Cardiovascular: Negative for chest pain, palpitations and leg swelling. Gastrointestinal: Negative. Negative for constipation. Genitourinary: Negative. Musculoskeletal: Negative for myalgias. Skin: Negative. Neurological: none   Endo/Heme/Allergies: Negative. Psychiatric/Behavioral: Negative for depression and memory loss. The patient does not have insomnia. Physical Exam   Constitutional: She is oriented to person, place, and time. She appears well-developed and well-nourished. HENT:   Head: Normocephalic. Eyes: Conjunctivae and extraocular motions are normal. Pupils are equal, round, and reactive to light. Neck: Normal range of motion. Neck supple. No JVD present. No tracheal deviation present. No thyromegaly present. Cardiovascular: Normal rate, regular rhythm and normal heart sounds. No murmur heard. Pulmonary/Chest: Breath sounds normal.   Abdominal: Soft. Bowel sounds are normal.   Musculoskeletal: Normal range of motion. Lymphadenopathy:   She has no cervical adenopathy. Neurological: She is alert and oriented to person, place, and time. She has normal reflexes. Skin: Skin is warm. Psychiatric: She has a normal mood and affect.        Lab Results   Component Value Date/Time    Hemoglobin A1c 7.5 11/22/2017 07:06 AM    Hemoglobin A1c 7.4 08/17/2017 07:23 AM    Hemoglobin A1c 8.8 02/03/2017 07:10 AM    Microalb/Creat ratio (ug/mg creat.) 859.6 11/22/2017 07:06 AM    LDL, calculated 106 11/22/2017 07:06 AM    Creatinine 3.17 11/22/2017 07:06 AM    Hemoglobin A1c, External 8.7 07/29/2016      Lab Results   Component Value Date/Time    Cholesterol, total 188 11/22/2017 07:06 AM    HDL Cholesterol 49 11/22/2017 07:06 AM    LDL, calculated 106 11/22/2017 07:06 AM    Triglyceride 166 11/22/2017 07:06 AM     Lab Results   Component Value Date/Time    AST (SGOT) 15 11/22/2017 07:06 AM    Alk. phosphatase 84 11/22/2017 07:06 AM     Lab Results   Component Value Date/Time    GFR est AA 17 11/22/2017 07:06 AM    GFR est non-AA 15 11/22/2017 07:06 AM    Creatinine 3.17 11/22/2017 07:06 AM    BUN 52 11/22/2017 07:06 AM    Sodium 142 11/22/2017 07:06 AM    Potassium 3.8 11/22/2017 07:06 AM    Chloride 102 11/22/2017 07:06 AM    CO2 21 11/22/2017 07:06 AM          ASSESSMENT and PLAN     1. DM 2 uncontrolled-:    A1C is 7.5 %   From nov 22 2017    compared to   7.4 %   From     Aug 2017     Compared to  8.8 %   From feb 2017  Compared to    7.6 %     From  Nov 2016 compared to  8.7 %    From aug 2016  Compared to  7.9 %    From April 2016  Compared to   7.7 %    From jan 2016  Compared to   7.3 %       From nov 2015 corrected to     7.8 % From  ,    8.2 %    From July 2015   ( finger stick check is 7.6 ) compared to   8.4 %   From feb 2015 compared to   9.8 %   From dec 2014 compared to  9.1 %  From oct 2014 compared to    8 %  from May 2014 compared to 7.5 % from march 2014 compared to 9.8 % from jan 2014 compared to over 11 % from hospital ( going by her )    Maintained  glycemic control with all the stress she had with her    S/p gastric banding   Off tradjenta 5 mg a day for cost      Because of cost reasons , she is not able to take  tanzeum  50 mg a week   Explained the need for discipline and more checks and she is doing it her best     Continue  on basal bolus regimen   Checking  Sugars, improved         2.  HTN :   well  controlled,  :  on lotrel , Hydralazine , and atenolol. 3. CKD : stage 4  f/u with Dr. Deirdre Penn     4. Elevated Liver enzymes- resolved     5. Obesity   Body mass index is 41.04 kg/(m^2). She cannot afford incretins at this time, so tanzeum is optional       6.  Hyperlipidemia : improve compliance on crestor           > 50 % of time is spent on counseling     F/u in 12 weeks with log and labs   Might need carb training

## 2017-11-29 NOTE — PROGRESS NOTES
Chief Complaint   Patient presents with    Diabetes     1. Have you been to the ER, urgent care clinic since your last visit? Hospitalized since your last visit? No    2. Have you seen or consulted any other health care providers outside of the [address] [place] [address] since your last visit? Include any pap smears or colon screening.  No     Wt Readings from Last 3 Encounters:   11/29/17 224 lb 6.4 oz (101.8 kg)   08/25/17 225 lb 9.6 oz (102.3 kg)   03/20/17 224 lb (101.6 kg)     Temp Readings from Last 3 Encounters:   11/29/17 96.9 °F (36.1 °C) (Oral)   08/25/17 96.5 °F (35.8 °C) (Oral)   03/20/17 97.1 °F (36.2 °C) (Oral)     BP Readings from Last 3 Encounters:   11/29/17 138/61   08/25/17 131/67   03/20/17 184/85     Pulse Readings from Last 3 Encounters:   11/29/17 73   08/25/17 67   03/20/17 70     Pt  Has a meter  No eye or foot exam  No

## 2018-02-09 ENCOUNTER — OP HISTORICAL/CONVERTED ENCOUNTER (OUTPATIENT)
Dept: OTHER | Age: 63
End: 2018-02-09

## 2018-02-16 ENCOUNTER — IP HISTORICAL/CONVERTED ENCOUNTER (OUTPATIENT)
Dept: OTHER | Age: 63
End: 2018-02-16

## 2018-03-16 ENCOUNTER — TELEPHONE (OUTPATIENT)
Dept: ENDOCRINOLOGY | Age: 63
End: 2018-03-16

## 2018-03-16 DIAGNOSIS — E11.65 TYPE 2 DIABETES MELLITUS WITH HYPERGLYCEMIA, WITH LONG-TERM CURRENT USE OF INSULIN (HCC): Primary | ICD-10-CM

## 2018-03-16 DIAGNOSIS — Z79.4 TYPE 2 DIABETES MELLITUS WITH HYPERGLYCEMIA, WITH LONG-TERM CURRENT USE OF INSULIN (HCC): Primary | ICD-10-CM

## 2018-03-24 LAB
ALBUMIN SERPL-MCNC: 4.1 G/DL (ref 3.6–4.8)
ALBUMIN/CREAT UR: 1473.3 MG/G CREAT (ref 0–30)
ALBUMIN/GLOB SERPL: 1.3 {RATIO} (ref 1.2–2.2)
ALP SERPL-CCNC: 85 IU/L (ref 39–117)
ALT SERPL-CCNC: 13 IU/L (ref 0–32)
AST SERPL-CCNC: 18 IU/L (ref 0–40)
BILIRUB SERPL-MCNC: 0.3 MG/DL (ref 0–1.2)
BUN SERPL-MCNC: 57 MG/DL (ref 8–27)
BUN/CREAT SERPL: 19 (ref 12–28)
CALCIUM SERPL-MCNC: 9.4 MG/DL (ref 8.7–10.3)
CHLORIDE SERPL-SCNC: 102 MMOL/L (ref 96–106)
CHOLEST SERPL-MCNC: 172 MG/DL (ref 100–199)
CO2 SERPL-SCNC: 18 MMOL/L (ref 18–29)
CREAT SERPL-MCNC: 3.08 MG/DL (ref 0.57–1)
CREAT UR-MCNC: 64.5 MG/DL
EST. AVERAGE GLUCOSE BLD GHB EST-MCNC: 166 MG/DL
GFR SERPLBLD CREATININE-BSD FMLA CKD-EPI: 15 ML/MIN/1.73
GFR SERPLBLD CREATININE-BSD FMLA CKD-EPI: 18 ML/MIN/1.73
GLOBULIN SER CALC-MCNC: 3.2 G/DL (ref 1.5–4.5)
GLUCOSE SERPL-MCNC: 75 MG/DL (ref 65–99)
HBA1C MFR BLD: 7.4 % (ref 4.8–5.6)
HDLC SERPL-MCNC: 47 MG/DL
INTERPRETATION, 910389: NORMAL
INTERPRETATION: NORMAL
LDLC SERPL CALC-MCNC: 102 MG/DL (ref 0–99)
Lab: NORMAL
MICROALBUMIN UR-MCNC: 950.3 UG/ML
PDF IMAGE, 910387: NORMAL
POTASSIUM SERPL-SCNC: 4.1 MMOL/L (ref 3.5–5.2)
PROT SERPL-MCNC: 7.3 G/DL (ref 6–8.5)
SODIUM SERPL-SCNC: 142 MMOL/L (ref 134–144)
TRIGL SERPL-MCNC: 117 MG/DL (ref 0–149)
VLDLC SERPL CALC-MCNC: 23 MG/DL (ref 5–40)

## 2018-03-29 ENCOUNTER — OFFICE VISIT (OUTPATIENT)
Dept: ENDOCRINOLOGY | Age: 63
End: 2018-03-29

## 2018-03-29 VITALS
HEIGHT: 62 IN | TEMPERATURE: 97.3 F | RESPIRATION RATE: 18 BRPM | HEART RATE: 76 BPM | BODY MASS INDEX: 40.65 KG/M2 | WEIGHT: 220.9 LBS | SYSTOLIC BLOOD PRESSURE: 170 MMHG | DIASTOLIC BLOOD PRESSURE: 80 MMHG | OXYGEN SATURATION: 99 %

## 2018-03-29 DIAGNOSIS — E66.01 MORBID OBESITY (HCC): ICD-10-CM

## 2018-03-29 DIAGNOSIS — I10 ESSENTIAL HYPERTENSION: ICD-10-CM

## 2018-03-29 DIAGNOSIS — N18.30 CKD (CHRONIC KIDNEY DISEASE) STAGE 3, GFR 30-59 ML/MIN (HCC): ICD-10-CM

## 2018-03-29 DIAGNOSIS — E78.2 MIXED HYPERLIPIDEMIA: ICD-10-CM

## 2018-03-29 PROBLEM — E11.21 TYPE 2 DIABETES WITH NEPHROPATHY (HCC): Status: ACTIVE | Noted: 2018-03-29

## 2018-03-29 RX ORDER — INSULIN GLARGINE 100 [IU]/ML
34 INJECTION, SOLUTION SUBCUTANEOUS
Qty: 30 ML | Refills: 4 | Status: SHIPPED | OUTPATIENT
Start: 2018-03-29 | End: 2019-05-19 | Stop reason: SDUPTHER

## 2018-03-29 NOTE — PROGRESS NOTES
Wt Readings from Last 3 Encounters:   03/29/18 220 lb 14.4 oz (100.2 kg)   11/29/17 224 lb 6.4 oz (101.8 kg)   08/25/17 225 lb 9.6 oz (102.3 kg)     Temp Readings from Last 3 Encounters:   03/29/18 97.3 °F (36.3 °C) (Oral)   11/29/17 96.9 °F (36.1 °C) (Oral)   08/25/17 96.5 °F (35.8 °C) (Oral)     BP Readings from Last 3 Encounters:   03/29/18 170/80   11/29/17 138/61   08/25/17 131/67     Pulse Readings from Last 3 Encounters:   03/29/18 76   11/29/17 73   08/25/17 67     Lab Results   Component Value Date/Time    Hemoglobin A1c 7.4 (H) 03/23/2018 07:21 AM    Hemoglobin A1c (POC) 7.3 11/05/2015 02:09 PM    Hemoglobin A1c, External 8.7 07/29/2016     Eye exam scheduled for next week.

## 2018-03-29 NOTE — MR AVS SNAPSHOT
[facility] [address] 
[phone] Patient: Slim Shelley MRN: [id number] SIV:[id number] Visit Information Date & Time Provider Department Dept. Phone Encounter #  
 3/29/2018  3:45 PM Gerber Hagan MD [facility] Diabetes & Endocrinology [phone] [id number] Follow-up Instructions Return in about 3 months (around 6/29/2018). Upcoming Health Maintenance Date Due Hepatitis C Screening 1955 Pneumococcal 19-64 Medium Risk (1 of 1 - PPSV23) 6/19/1972 DTaP/Tdap/Td series (1 - Tdap) 6/19/1974 PAP AKA CERVICAL CYTOLOGY 6/19/1974 BREAST CANCER SCRN MAMMOGRAM 6/19/2003 FOBT Q 1 YEAR AGE 50-75 6/19/2003 ZOSTER VACCINE AGE 60> 4/19/2013 FOOT EXAM Q1 3/12/2015 EYE EXAM RETINAL OR DILATED Q1 10/6/2015 Influenza Age 5 to Adult 8/1/2017 Bone Densitometry (Dexa) Screening 6/19/2018 HEMOGLOBIN A1C Q6M 9/23/2018 MICROALBUMIN Q1 3/23/2019 LIPID PANEL Q1 3/23/2019 Allergies as of 3/29/2018  Review Complete On: 3/29/2018 By: Gerber Hagan MD  
 No Known Allergies Current Immunizations  Never Reviewed No immunizations on file. Not reviewed this visit You Were Diagnosed With   
  
 Codes Comments Uncontrolled type 2 diabetes mellitus with stage 4 chronic kidney disease, with long-term current use of insulin (HCC)    -  Primary ICD-10-CM: E11.22, E11.65, N18.4, Z79.4 ICD-9-CM: 250.52, 585.4, V58.67 Vitals BP Pulse Temp Resp Height(growth percentile) Weight(growth percentile) 170/80 (BP 1 Location: Right arm, BP Patient Position: Sitting) 76 97.3 °F (36.3 °C) (Oral) 18 5' 2\" (1.575 m) 220 lb 14.4 oz (100.2 kg) SpO2 BMI OB Status Smoking Status 99% 40.4 kg/m2 Postmenopausal Never Smoker Vitals History BMI and BSA Data Body Mass Index Body Surface Area 40.4 kg/m 2 2.09 m 2 Preferred Pharmacy Pharmacy Name Phone [address] [facility] [place], [address] [address] [facility] OF [address] [place] [address] ([phone] Your Updated Medication List  
  
   
This list is accurate as of 3/29/18  4:10 PM.  Always use your most recent med list.  
  
  
  
  
 albiglutide 50 mg/0.5 mL injector pen Commonly known as:  TANZEUM  
50 mg by SubCUTAneous route every seven (7) days. STOP 30 MG DOSE. To use with savings card and voucher  
  
 amLODIPine-benazepril 10-40 mg per capsule Commonly known as:  LOTREL  
TAKE 1 CAPSULE DAILY  
  
 aspirin delayed-release 81 mg tablet Take  by mouth daily. atenolol 100 mg tablet Commonly known as:  TENORMIN  
TAKE 1 TABLET BY MOUTH DAILY  
  
 calcitRIOL 0.25 mcg capsule Commonly known as:  ROCALTROL TK 1 C PO D  
  
 CLARITIN 10 mg tablet Generic drug:  loratadine Take 10 mg by mouth daily. furosemide 40 mg tablet Commonly known as:  LASIX Take 40 mg by mouth three (3) times daily. glucose blood VI test strips strip Commonly known as:  ONETOUCH ULTRA TEST Test 4 times daily. DX CODE E11.65  
  
 hydrALAZINE 25 mg tablet Commonly known as:  APRESOLINE Take 1 Tab by mouth three (3) times daily. * insulin glargine 100 unit/mL (3 mL) Inpn Commonly known as:  LANT,BASAGLAR  
sample * insulin glargine 100 unit/mL (3 mL) Inpn Commonly known as:  LANTUS SOLOSTAR U-100 INSULIN  
40 Units by SubCUTAneous route nightly. insulin lispro 100 unit/mL kwikpen Commonly known as:  HUMALOG Increase to 8 units with breakfast, lunch, and dinner. Plus sliding scale. STOP MIXED INSULINS Insulin Needles (Disposable) 32 gauge x 5/32\" Ndle Commonly known as:  Mirian Pen Needle Use 4 times daily. Dx code E11.65  
  
 insulin syringe-needle U-100 1 mL 31 gauge x 15/64\" Syrg Commonly known as:  BD INSULIN SYRINGE ULTRA-FINE  
1 Syringe by Does Not Apply route daily. Use once daily isosorbide mononitrate ER 30 mg tablet Commonly known as:  IMDUR Take 60 mg by mouth two (2) times a day. Lancets Misc Commonly known as: One Touch Vermell Loge Test 4 times daily. DX CODE 250.00 NYSTOP powder Generic drug:  nystatin CHANDRAKANT UTD BID EXTERNALLY  
  
 ONE-A-DAY WOMENS FORMULA PO Take  by mouth. rosuvastatin 10 mg tablet Commonly known as:  CRESTOR  
TAKE 1 TABLET NIGHTLY  
  
 VITAMIN D2 PO Take 5,000 Units by mouth daily. * Notice: This list has 2 medication(s) that are the same as other medications prescribed for you. Read the directions carefully, and ask your doctor or other care provider to review them with you. Follow-up Instructions Return in about 3 months (around 6/29/2018). Patient Instructions Do not skip meals Do not eat in between meals Reduce carbs- pasta, rice, potatoes, bread Do not drink juices or sodas Donot eat peanut butter Do not eat sugar free cookies and cakes Do not eat peaches, grapes, mangoes, pine apples, oranges,  Tangerines Check blood sugars immediately before each meal and at bedtime Decrease lantus  34 units  At night  
 
 humalog clear 8 units with b-fast,  lunch and dinner Add  humalog clear insulin as follows  :  
 
151-200 mg  2 units 201- 250 mg 4 units 301- 350 mg 6 units 301-350 mg  8 units 351-400 mg  10 units 401-450 mg  12 units 451-500 mg 14 units Less than 70 mg- Do not take insulin Introducing [facility]! Dear Timothy Alcaraz: Thank you for requesting a [employer] account. Our records indicate that you already have an active [employer] account. You can access your account anytime at [email or web address] Did you know that you can access your hospital and ER discharge instructions at any time in [employer]? You can also review all of your test results from your hospital stay or ER visit. Additional Information If you have questions, please visit the Frequently Asked Questions section of the [employer] website at [email or web address] Remember, [employer] is NOT to be used for urgent needs. For medical emergencies, dial 911. Now available from your iPhone and Android! Please provide this summary of care documentation to your next provider. Your primary care clinician is listed as Ady Aaron. If you have any questions after today's visit, please call [phone].

## 2018-03-29 NOTE — PATIENT INSTRUCTIONS
Do not skip meals  Do not eat in between meals    Reduce carbs- pasta, rice, potatoes, bread   Do not drink juices or sodas  Donot eat peanut butter     Do not eat sugar free cookies and cakes   Do not eat peaches, grapes, mangoes, pine apples, oranges,  Tangerines       Check blood sugars immediately before each meal and at bedtime    Decrease lantus  34 units  At night      humalog clear 8 units with b-fast,  lunch and dinner       Add  humalog clear insulin as follows  :     151-200 mg  2 units     201- 250 mg 4 units     301- 350 mg 6 units     301-350 mg  8 units    351-400 mg  10 units    401-450 mg  12 units    451-500 mg 14 units       Less than 70 mg- Do not take insulin

## 2018-03-29 NOTE — PROGRESS NOTES
HISTORY OF PRESENT ILLNESS   Caitlin Alvarez is a 57 y.o. female. HPI   F/u for DM 2 after  Nov 2017    Her  got diagnosed with stage 4 lung cancer  Weight loss of 4   lbs   Her  is hospitalized twice in between and she is busy with him    Not taking Tolerating tanzeum for cost reasons  She has  been taking the basal bolus regimen   She brought  the log   Has low sugars in AM       Review of Systems   Constitutional: Negative. HENT: Negative. Eyes: Negative for pain and redness. Respiratory: Negative. Cardiovascular: Negative for chest pain, palpitations and leg swelling. Gastrointestinal: Negative. Negative for constipation. Genitourinary: Negative. Musculoskeletal: Negative for myalgias. Skin: Negative. Neurological: none   Endo/Heme/Allergies: Negative. Psychiatric/Behavioral: Negative for depression and memory loss. The patient does not have insomnia. Physical Exam   Constitutional: She is oriented to person, place, and time. She appears well-developed and well-nourished. HENT:   Head: Normocephalic. Eyes: Conjunctivae and extraocular motions are normal. Pupils are equal, round, and reactive to light. Neck: Normal range of motion. Neck supple. No JVD present. No tracheal deviation present. No thyromegaly present. Cardiovascular: Normal rate, regular rhythm and normal heart sounds. No murmur heard. Pulmonary/Chest: Breath sounds normal.   Abdominal: Soft. Bowel sounds are normal.   Musculoskeletal: Normal range of motion. Left forearm fistula   Lymphadenopathy:   She has no cervical adenopathy. Neurological: She is alert and oriented to person, place, and time. She has normal reflexes. Skin: Skin is warm. Psychiatric: She has a normal mood and affect.      Diabetic foot exam: March 2018    Left: Reflexes nd     Vibratory sensation diminished    Proprioception nd   Sharp/dull discrimination nd    Filament test reduced sensation with micro filament   Pulse DP: 2+ (normal)   Pulse PT: nd   Deformities: None  Right: Reflexes nd   Vibratory sensation diminished   Proprioception nd   Sharp/dull discrimination nd   Filament test reduced sensation with micro filament   Pulse DP: 2+ (normal)   Pulse PT: nd   Deformities: None    Lab Results   Component Value Date/Time    Hemoglobin A1c 7.4 (H) 03/23/2018 07:21 AM    Hemoglobin A1c 7.5 (H) 11/22/2017 07:06 AM    Hemoglobin A1c 7.4 (H) 08/17/2017 07:23 AM    Microalb/Creat ratio (ug/mg creat.) 1473.3 (H) 03/23/2018 07:21 AM    LDL, calculated 102 (H) 03/23/2018 07:21 AM    Creatinine 3.08 (H) 03/23/2018 07:21 AM    Hemoglobin A1c, External 8.7 07/29/2016      Lab Results   Component Value Date/Time    Cholesterol, total 172 03/23/2018 07:21 AM    HDL Cholesterol 47 03/23/2018 07:21 AM    LDL, calculated 102 (H) 03/23/2018 07:21 AM    Triglyceride 117 03/23/2018 07:21 AM     Lab Results   Component Value Date/Time    AST (SGOT) 18 03/23/2018 07:21 AM    Alk.  phosphatase 85 03/23/2018 07:21 AM     Lab Results   Component Value Date/Time    GFR est AA 18 (L) 03/23/2018 07:21 AM    GFR est non-AA 15 (L) 03/23/2018 07:21 AM    Creatinine 3.08 (H) 03/23/2018 07:21 AM    BUN 57 (H) 03/23/2018 07:21 AM    Sodium 142 03/23/2018 07:21 AM    Potassium 4.1 03/23/2018 07:21 AM    Chloride 102 03/23/2018 07:21 AM    CO2 18 03/23/2018 07:21 AM          ASSESSMENT and PLAN     1. DM 2 uncontrolled-:    A1C is   7.4 %         From march 2018     Compared to  7.5 %   From nov 22 2017    compared to   7.4 %   From     Aug 2017     Compared to  8.8 %   From feb 2017  Compared to    7.6 %     From  Nov 2016 compared to  8.7 %    From aug 2016  Compared to  7.9 %    From April 2016  Compared to   7.7 %    From jan 2016  Compared to   7.3 %       From nov 2015 corrected to     7.8 % From  ,    8.2 %    From July 2015   ( finger stick check is 7.6 ) compared to   8.4 %   From feb 2015 compared to   9.8 %   From dec 2014 compared to 9.1 %  From oct 2014 compared to    8 %  from May 2014 compared to 7.5 % from march 2014 compared to 9.8 % from jan 2014 compared to over 11 % from hospital ( going by her )    Maintained  glycemic control with all the stress she had with her    S/p gastric banding   Off tradjenta 5 mg a day for cost      Because of cost reasons , she is not able to take  tanzeum  50 mg a week   Explained the need for discipline and more checks and she is doing it her best     Continue  on basal bolus regimen   Checking  Sugars, to do it dinner and bed time       Decrease lantus for AM hypoglycemias       2. HTN :   well  controlled,  :  on lotrel , Hydralazine , and atenolol. 3. CKD : stage 4  f/u with Dr. Anne Marie Wilkes     4. Elevated Liver enzymes- resolved     5. Obesity   Body mass index is 40.4 kg/(m^2). She cannot afford incretins at this time, so tanzeum is optional       6.  Hyperlipidemia : improve compliance on crestor           > 50 % of time is spent on counseling     F/u in 12 weeks with log and labs   Might need carb training

## 2018-05-24 RX ORDER — ROSUVASTATIN CALCIUM 10 MG/1
TABLET, COATED ORAL
Qty: 90 TAB | Refills: 3 | Status: SHIPPED | OUTPATIENT
Start: 2018-05-24 | End: 2019-07-14 | Stop reason: SDUPTHER

## 2018-05-24 RX ORDER — AMLODIPINE BESYLATE AND BENAZEPRIL HYDROCHLORIDE 10; 40 MG/1; MG/1
CAPSULE ORAL
Qty: 90 CAP | Refills: 3 | Status: SHIPPED | OUTPATIENT
Start: 2018-05-24 | End: 2019-05-19 | Stop reason: SDUPTHER

## 2018-06-07 ENCOUNTER — OP HISTORICAL/CONVERTED ENCOUNTER (OUTPATIENT)
Dept: OTHER | Age: 63
End: 2018-06-07

## 2018-06-12 ENCOUNTER — OP HISTORICAL/CONVERTED ENCOUNTER (OUTPATIENT)
Dept: OTHER | Age: 63
End: 2018-06-12

## 2018-06-16 LAB
ALBUMIN SERPL-MCNC: 4.2 G/DL (ref 3.6–4.8)
ALBUMIN/CREAT UR: 996.4 MG/G CREAT (ref 0–30)
ALBUMIN/GLOB SERPL: 1.5 {RATIO} (ref 1.2–2.2)
ALP SERPL-CCNC: 65 IU/L (ref 39–117)
ALT SERPL-CCNC: 12 IU/L (ref 0–32)
AST SERPL-CCNC: 17 IU/L (ref 0–40)
BILIRUB SERPL-MCNC: 0.3 MG/DL (ref 0–1.2)
BUN SERPL-MCNC: 53 MG/DL (ref 8–27)
BUN/CREAT SERPL: 13 (ref 12–28)
CALCIUM SERPL-MCNC: 9.6 MG/DL (ref 8.7–10.3)
CHLORIDE SERPL-SCNC: 98 MMOL/L (ref 96–106)
CHOLEST SERPL-MCNC: 155 MG/DL (ref 100–199)
CO2 SERPL-SCNC: 20 MMOL/L (ref 20–29)
CREAT SERPL-MCNC: 4.04 MG/DL (ref 0.57–1)
CREAT UR-MCNC: 130.1 MG/DL
EST. AVERAGE GLUCOSE BLD GHB EST-MCNC: 166 MG/DL
GFR SERPLBLD CREATININE-BSD FMLA CKD-EPI: 11 ML/MIN/1.73
GFR SERPLBLD CREATININE-BSD FMLA CKD-EPI: 13 ML/MIN/1.73
GLOBULIN SER CALC-MCNC: 2.8 G/DL (ref 1.5–4.5)
GLUCOSE SERPL-MCNC: 119 MG/DL (ref 65–99)
HBA1C MFR BLD: 7.4 % (ref 4.8–5.6)
HDLC SERPL-MCNC: 63 MG/DL
INTERPRETATION, 910389: NORMAL
INTERPRETATION: NORMAL
LDLC SERPL CALC-MCNC: 72 MG/DL (ref 0–99)
Lab: NORMAL
MICROALBUMIN UR-MCNC: 1296.3 UG/ML
PDF IMAGE, 910387: NORMAL
POTASSIUM SERPL-SCNC: 4.3 MMOL/L (ref 3.5–5.2)
PROT SERPL-MCNC: 7 G/DL (ref 6–8.5)
SODIUM SERPL-SCNC: 134 MMOL/L (ref 134–144)
TRIGL SERPL-MCNC: 99 MG/DL (ref 0–149)
VLDLC SERPL CALC-MCNC: 20 MG/DL (ref 5–40)

## 2018-06-22 ENCOUNTER — OFFICE VISIT (OUTPATIENT)
Dept: ENDOCRINOLOGY | Age: 63
End: 2018-06-22

## 2018-06-22 VITALS
RESPIRATION RATE: 18 BRPM | SYSTOLIC BLOOD PRESSURE: 144 MMHG | HEART RATE: 64 BPM | BODY MASS INDEX: 40.08 KG/M2 | OXYGEN SATURATION: 99 % | WEIGHT: 217.8 LBS | TEMPERATURE: 97.5 F | HEIGHT: 62 IN | DIASTOLIC BLOOD PRESSURE: 65 MMHG

## 2018-06-22 DIAGNOSIS — I10 ESSENTIAL HYPERTENSION: ICD-10-CM

## 2018-06-22 DIAGNOSIS — E11.21 TYPE 2 DIABETES WITH NEPHROPATHY (HCC): Primary | ICD-10-CM

## 2018-06-22 DIAGNOSIS — E66.01 MORBID OBESITY (HCC): ICD-10-CM

## 2018-06-22 DIAGNOSIS — N18.4 STAGE 4 CHRONIC KIDNEY DISEASE (HCC): ICD-10-CM

## 2018-06-22 RX ORDER — SODIUM BICARBONATE 650 MG/1
TABLET ORAL
Refills: 2 | COMMUNITY
Start: 2018-06-21 | End: 2020-01-01 | Stop reason: CLARIF

## 2018-06-22 RX ORDER — INSULIN PUMP SYRINGE, 3 ML
EACH MISCELLANEOUS
Qty: 1 KIT | Refills: 0 | Status: SHIPPED | OUTPATIENT
Start: 2018-06-22

## 2018-06-22 NOTE — MR AVS SNAPSHOT
[address] [place] [address] [address] [place] [address] 
[phone] Patient: Socorro Garcia MRN: [id number] UBM:3/86/1126 Visit Information Date & Time Provider Department Dept. Phone Encounter #  
 6/22/2018  2:15 PM Yaneth López MD [facility] Diabetes & Endocrinology [phone] [id number] Follow-up Instructions Return in about 4 months (around 10/22/2018). Upcoming Health Maintenance Date Due Hepatitis C Screening 1955 DTaP/Tdap/Td series (1 - Tdap) 6/19/1974 PAP AKA CERVICAL CYTOLOGY 6/19/1974 BREAST CANCER SCRN MAMMOGRAM 6/19/2003 FOBT Q 1 YEAR AGE 50-75 6/19/2003 ZOSTER VACCINE AGE 60> 4/19/2013 FOOT EXAM Q1 3/12/2015 Bone Densitometry (Dexa) Screening 6/19/2018 Pneumococcal 65+ Low/Medium Risk (1 of 2 - PCV13) 6/19/2018 Influenza Age 5 to Adult 8/1/2018 HEMOGLOBIN A1C Q6M 12/15/2018 EYE EXAM RETINAL OR DILATED Q1 5/8/2019 MICROALBUMIN Q1 6/15/2019 LIPID PANEL Q1 6/15/2019 GLAUCOMA SCREENING Q2Y 5/8/2020 Allergies as of 6/22/2018  Review Complete On: 6/22/2018 By: Yaneth López MD  
 No Known Allergies Current Immunizations  Never Reviewed No immunizations on file. Not reviewed this visit Vitals BP Pulse Temp Resp Height(growth percentile) Weight(growth percentile) 144/65 (BP 1 Location: Right arm, BP Patient Position: Sitting) 64 97.5 °F (36.4 °C) (Oral) 18 5' 2\" (1.575 m) 217 lb 12.8 oz (98.8 kg) SpO2 BMI OB Status Smoking Status 99% 39.84 kg/m2 Postmenopausal Never Smoker Vitals History BMI and BSA Data Body Mass Index Body Surface Area  
 39.84 kg/m 2 2.08 m 2 Preferred Pharmacy Pharmacy Name Phone  [address] [phone] Your Updated Medication List  
  
   
This list is accurate as of 6/22/18  3:05 PM.  Always use your most recent med list.  
  
  
  
  
 albiglutide 50 mg/0.5 mL injector pen Commonly known as:  TANZEUM  
50 mg by SubCUTAneous route every seven (7) days. STOP 30 MG DOSE. To use with savings card and voucher  
  
 amLODIPine-benazepril 10-40 mg per capsule Commonly known as:  LOTREL  
TAKE 1 CAPSULE DAILY  
  
 aspirin delayed-release 81 mg tablet Take  by mouth daily. atenolol 100 mg tablet Commonly known as:  TENORMIN  
TAKE 1 TABLET BY MOUTH DAILY  
  
 calcitRIOL 0.25 mcg capsule Commonly known as:  ROCALTROL TK 1 C PO D  
  
 CLARITIN 10 mg tablet Generic drug:  loratadine Take 10 mg by mouth daily. furosemide 40 mg tablet Commonly known as:  LASIX Take 40 mg by mouth three (3) times daily. glucose blood VI test strips strip Commonly known as:  ONETOUCH ULTRA TEST Test 4 times daily. DX CODE E11.65  
  
 hydrALAZINE 25 mg tablet Commonly known as:  APRESOLINE Take 1 Tab by mouth three (3) times daily. * insulin glargine 100 unit/mL (3 mL) Inpn Commonly known as:  ANGIE WHITMAN  
sample * insulin glargine 100 unit/mL (3 mL) Inpn Commonly known as:  LANTUS SOLOSTAR U-100 INSULIN  
34 Units by SubCUTAneous route nightly. Note the decrease  
  
 insulin lispro 100 unit/mL kwikpen Commonly known as:  HUMALOG Increase to 8 units with breakfast, lunch, and dinner. Plus sliding scale. STOP MIXED INSULINS Insulin Needles (Disposable) 32 gauge x 5/32\" Ndle Commonly known as:  Mirian Pen Needle Use 4 times daily. Dx code E11.65  
  
 insulin syringe-needle U-100 1 mL 31 gauge x 15/64\" Syrg Commonly known as:  BD INSULIN SYRINGE ULTRA-FINE  
1 Syringe by Does Not Apply route daily. Use once daily  
  
 isosorbide mononitrate ER 30 mg tablet Commonly known as:  IMDUR Take 60 mg by mouth two (2) times a day. Lancets Misc Commonly known as: One Touch Bossman Severance Test 4 times daily. DX CODE 250.00 NYSTOP powder Generic drug:  nystatin CHANDRAKANT UTD BID EXTERNALLY ONE-A-DAY WOMENS FORMULA PO Take  by mouth. rosuvastatin 10 mg tablet Commonly known as:  CRESTOR  
TAKE 1 TABLET NIGHTLY  
  
 sodium bicarbonate 650 mg tablet TK 1 T PO BID  
  
 VITAMIN D2 PO Take 5,000 Units by mouth daily. * Notice: This list has 2 medication(s) that are the same as other medications prescribed for you. Read the directions carefully, and ask your doctor or other care provider to review them with you. Follow-up Instructions Return in about 4 months (around 10/22/2018). Patient Instructions   
-------------------------------------------------------------------------------------------- Refills    -    please call your pharmacy and have them send us a refill request 
 
Results  -  allow up to a week for lab results to be processed and reviewed. Phone calls  -  Allow upto 24 hrs. for non-urgent calls to be retained Prior authorization - It may take up to 2 weeks to process, depending on your insurance Forms  -  FMLA, DMV, patient assistance, etc. will take up to 2 weeks to process Cancellations - please notify the office in advance if you cannot keep your appointment Samples  - will only be dispensed at visits as supply is limited If you are having a medical emergency call 911 
 
-------------------------------------------------------------------------------------------- Do not skip meals Do not eat in between meals Reduce carbs- pasta, rice, potatoes, bread Do not drink juices or sodas Donot eat peanut butter Do not eat sugar free cookies and cakes Do not eat peaches, grapes, mangoes, pine apples, oranges,  Tangerines Check blood sugars immediately before each meal and at bedtime 
 
 lantus  34 units  At night  
 
 humalog clear 8 units with b-fast,  lunch and dinner Add  humalog clear insulin as follows  :  
 
151-200 mg  2 units 201- 250 mg 4 units 301- 350 mg 6 units 301-350 mg  8 units 351-400 mg  10 units 401-450 mg  12 units 451-500 mg 14 units Less than 70 mg- Do not take insulin Introducing [facility]! Dear Tremaine Keita: Thank you for requesting a [employer] account. Our records indicate that you already have an active Action account. You can access your account anytime at [email or web address] Did you know that you can access your hospital and ER discharge instructions at any time in Action? You can also review all of your test results from your hospital stay or ER visit. Additional Information If you have questions, please visit the Frequently Asked Questions section of the Action website at [email or web address] Remember, Action is NOT to be used for urgent needs. For medical emergencies, dial 911. Now available from your iPhone and Android! Please provide this summary of care documentation to your next provider. Your primary care clinician is listed as Manual Beverage. If you have any questions after today's visit, please call [phone].

## 2018-06-22 NOTE — PATIENT INSTRUCTIONS
--------------------------------------------------------------------------------------------    Refills    -    please call your pharmacy and have them send us a refill request    Results  -  allow up to a week for lab results to be processed and reviewed. Phone calls  -  Allow upto 24 hrs.  for non-urgent calls to be retained    Prior authorization - It may take up to 2 weeks to process, depending on your insurance    Forms  -  FMLA, DMV, patient assistance, etc. will take up to 2 weeks to process    Cancellations - please notify the office in advance if you cannot keep your appointment    Samples  - will only be dispensed at visits as supply is limited      If you are having a medical emergency call 911    --------------------------------------------------------------------------------------------    Do not skip meals  Do not eat in between meals    Reduce carbs- pasta, rice, potatoes, bread   Do not drink juices or sodas  Donot eat peanut butter     Do not eat sugar free cookies and cakes   Do not eat peaches, grapes, mangoes, pine apples, oranges,  Tangerines       Check blood sugars immediately before each meal and at bedtime     lantus  34 units  At night      humalog clear 8 units with b-fast,  lunch and dinner       Add  humalog clear insulin as follows  :     151-200 mg  2 units     201- 250 mg 4 units     301- 350 mg 6 units     301-350 mg  8 units    351-400 mg  10 units    401-450 mg  12 units    451-500 mg 14 units       Less than 70 mg- Do not take insulin

## 2018-06-22 NOTE — PROGRESS NOTES
HISTORY OF PRESENT ILLNESS   Caitlin Ruby is a 70  y.o. female. HPI   F/u for DM 2 after  March 29 2018     She is very quiet   Forgetting meds     She got the log   She has only few sugars on log         Old history :     Her  got diagnosed with stage 4 lung cancer  Weight loss of 4   lbs   Her  is hospitalized twice in between and she is busy with him    Not taking Tolerating tanzeum for cost reasons  She has  been taking the basal bolus regimen   She brought  the log   Has low sugars in AM       Review of Systems   Constitutional: Negative. HENT: Negative. Eyes: Negative for pain and redness. Respiratory: Negative. Cardiovascular: Negative for chest pain, palpitations and leg swelling. Gastrointestinal: Negative. Negative for constipation. Genitourinary: Negative. Musculoskeletal: Negative for myalgias. Skin: Negative. Neurological: none   Endo/Heme/Allergies: Negative. Psychiatric/Behavioral: Negative for depression and memory loss. The patient does not have insomnia. Physical Exam   Constitutional: She is oriented to person, place, and time. She appears well-developed and well-nourished. HENT:   Head: Normocephalic. Eyes: Conjunctivae and extraocular motions are normal. Pupils are equal, round, and reactive to light. Neck: Normal range of motion. Neck supple. No JVD present. No tracheal deviation present. No thyromegaly present. Cardiovascular: Normal rate, regular rhythm and normal heart sounds. No murmur heard. Pulmonary/Chest: Breath sounds normal.   Abdominal: Soft. Bowel sounds are normal.   Musculoskeletal: Normal range of motion. Left forearm fistula   Lymphadenopathy:   She has no cervical adenopathy. Neurological: She is alert and oriented to person, place, and time. She has normal reflexes. Skin: Skin is warm. Psychiatric: She has a normal mood and affect.      Diabetic foot exam: March 2018    Left: Reflexes nd     Vibratory sensation diminished    Proprioception nd   Sharp/dull discrimination nd    Filament test reduced sensation with micro filament   Pulse DP: 2+ (normal)   Pulse PT: nd   Deformities: None  Right: Reflexes nd   Vibratory sensation diminished   Proprioception nd   Sharp/dull discrimination nd   Filament test reduced sensation with micro filament   Pulse DP: 2+ (normal)   Pulse PT: nd   Deformities: None    Lab Results   Component Value Date/Time    Hemoglobin A1c 7.4 (H) 06/15/2018 07:40 AM    Hemoglobin A1c 7.4 (H) 03/23/2018 07:21 AM    Hemoglobin A1c 7.5 (H) 11/22/2017 07:06 AM    Microalb/Creat ratio (ug/mg creat.) 996.4 (H) 06/15/2018 07:40 AM    LDL, calculated 72 06/15/2018 07:40 AM    Creatinine 4.04 (H) 06/15/2018 07:40 AM    Hemoglobin A1c, External 8.7 07/29/2016      Lab Results   Component Value Date/Time    Cholesterol, total 155 06/15/2018 07:40 AM    HDL Cholesterol 63 06/15/2018 07:40 AM    LDL, calculated 72 06/15/2018 07:40 AM    Triglyceride 99 06/15/2018 07:40 AM     Lab Results   Component Value Date/Time    AST (SGOT) 17 06/15/2018 07:40 AM    Alk.  phosphatase 65 06/15/2018 07:40 AM     Lab Results   Component Value Date/Time    GFR est AA 13 (L) 06/15/2018 07:40 AM    GFR est non-AA 11 (L) 06/15/2018 07:40 AM    Creatinine 4.04 (H) 06/15/2018 07:40 AM    BUN 53 (H) 06/15/2018 07:40 AM    Sodium 134 06/15/2018 07:40 AM    Potassium 4.3 06/15/2018 07:40 AM    Chloride 98 06/15/2018 07:40 AM    CO2 20 06/15/2018 07:40 AM          ASSESSMENT and PLAN     1. DM 2 uncontrolled-:    A1C is    7.4 %      From  June 2018      Compared      To    7.4 %         From march 2018     Compared to  7.5 %   From nov 22 2017    compared to   7.4 %   From     Aug 2017     Compared to  8.8 %   From feb 2017  Compared to    7.6 %     From  Nov 2016 compared to  8.7 %    From aug 2016  Compared to  7.9 %    From April 2016  Compared to   7.7 %    From jan 2016  Compared to   7.3 %       From nov 2015 corrected to     7.8 % From  ,    8.2 %    From July 2015   ( finger stick check is 7.6 ) compared to   8.4 %   From feb 2015 compared to   9.8 %   From dec 2014 compared to  9.1 %  From oct 2014 compared to    8 %  from May 2014 compared to 7.5 % from march 2014 compared to 9.8 % from jan 2014 compared to over 11 % from hospital ( going by her )    Maintained  glycemic control with all the stress she had with her    S/p gastric banding   Off tradjenta 5 mg a day for cost      Because of cost reasons , she is not able to take  tanzeum  50 mg a week   Explained the need for discipline and more checks and she is doing it her best     Continue  on basal bolus regimen   Checking  Sugars, to do it dinner and bed time       Decrease lantus for AM hypoglycemias       2. HTN :   well  controlled,  :  on lotrel , Hydralazine , and atenolol. 3. CKD : stage 4  f/u with Dr. Oly Frederick     4. Elevated Liver enzymes- resolved     5. Obesity   Body mass index is 39.84 kg/(m^2). She cannot afford incretins at this time, so tanzeum is optional       6.  Hyperlipidemia : improve compliance on crestor           > 50 % of time is spent on counseling     F/u in 16  weeks with log and labs   Might need carb training

## 2018-06-22 NOTE — PROGRESS NOTES
Wt Readings from Last 3 Encounters:   06/22/18 217 lb 12.8 oz (98.8 kg)   03/29/18 220 lb 14.4 oz (100.2 kg)   11/29/17 224 lb 6.4 oz (101.8 kg)     Temp Readings from Last 3 Encounters:   06/22/18 97.5 °F (36.4 °C) (Oral)   03/29/18 97.3 °F (36.3 °C) (Oral)   11/29/17 96.9 °F (36.1 °C) (Oral)     BP Readings from Last 3 Encounters:   06/22/18 144/65   03/29/18 170/80   11/29/17 138/61     Pulse Readings from Last 3 Encounters:   06/22/18 64   03/29/18 76   11/29/17 73     Lab Results   Component Value Date/Time    Hemoglobin A1c 7.4 (H) 06/15/2018 07:40 AM    Hemoglobin A1c (POC) 7.3 11/05/2015 02:09 PM    Hemoglobin A1c, External 8.7 07/29/2016     Patient has meter today. Patient states foot exam completed last visit.

## 2018-10-05 LAB
ALBUMIN SERPL-MCNC: 4.1 G/DL (ref 3.6–4.8)
ALBUMIN/CREAT UR: 1246.1 MG/G CREAT (ref 0–30)
ALBUMIN/GLOB SERPL: 1.4 {RATIO} (ref 1.2–2.2)
ALP SERPL-CCNC: 88 IU/L (ref 39–117)
ALT SERPL-CCNC: 10 IU/L (ref 0–32)
AST SERPL-CCNC: 15 IU/L (ref 0–40)
BILIRUB SERPL-MCNC: 0.3 MG/DL (ref 0–1.2)
BUN SERPL-MCNC: 55 MG/DL (ref 8–27)
BUN/CREAT SERPL: 15 (ref 12–28)
CALCIUM SERPL-MCNC: 9.4 MG/DL (ref 8.7–10.3)
CHLORIDE SERPL-SCNC: 103 MMOL/L (ref 96–106)
CHOLEST SERPL-MCNC: 159 MG/DL (ref 100–199)
CO2 SERPL-SCNC: 19 MMOL/L (ref 20–29)
CREAT SERPL-MCNC: 3.65 MG/DL (ref 0.57–1)
CREAT UR-MCNC: 60.9 MG/DL
EST. AVERAGE GLUCOSE BLD GHB EST-MCNC: 180 MG/DL
GLOBULIN SER CALC-MCNC: 2.9 G/DL (ref 1.5–4.5)
GLUCOSE SERPL-MCNC: 112 MG/DL (ref 65–99)
HBA1C MFR BLD: 7.9 % (ref 4.8–5.6)
HDLC SERPL-MCNC: 48 MG/DL
INTERPRETATION, 910389: NORMAL
INTERPRETATION: NORMAL
LDLC SERPL CALC-MCNC: 84 MG/DL (ref 0–99)
Lab: NORMAL
MICROALBUMIN UR-MCNC: 758.9 UG/ML
PDF IMAGE, 910387: NORMAL
POTASSIUM SERPL-SCNC: 3.9 MMOL/L (ref 3.5–5.2)
PROT SERPL-MCNC: 7 G/DL (ref 6–8.5)
SODIUM SERPL-SCNC: 139 MMOL/L (ref 134–144)
TRIGL SERPL-MCNC: 136 MG/DL (ref 0–149)
VLDLC SERPL CALC-MCNC: 27 MG/DL (ref 5–40)

## 2018-10-18 ENCOUNTER — OFFICE VISIT (OUTPATIENT)
Dept: ENDOCRINOLOGY | Age: 63
End: 2018-10-18

## 2018-10-18 VITALS
OXYGEN SATURATION: 100 % | HEIGHT: 62 IN | TEMPERATURE: 96.2 F | BODY MASS INDEX: 41.15 KG/M2 | HEART RATE: 71 BPM | WEIGHT: 223.6 LBS | RESPIRATION RATE: 16 BRPM | DIASTOLIC BLOOD PRESSURE: 79 MMHG | SYSTOLIC BLOOD PRESSURE: 174 MMHG

## 2018-10-18 DIAGNOSIS — I10 ESSENTIAL HYPERTENSION: ICD-10-CM

## 2018-10-18 DIAGNOSIS — E66.01 MORBID OBESITY (HCC): ICD-10-CM

## 2018-10-18 DIAGNOSIS — N18.4 STAGE 4 CHRONIC KIDNEY DISEASE (HCC): ICD-10-CM

## 2018-10-18 DIAGNOSIS — R80.1 PERSISTENT PROTEINURIA: ICD-10-CM

## 2018-10-18 DIAGNOSIS — E11.21 TYPE 2 DIABETES WITH NEPHROPATHY (HCC): Primary | ICD-10-CM

## 2018-10-18 NOTE — PROGRESS NOTES
All health maintenance and other pertinent information has been reviewed in preparation for today's office visit. Patient presents in the office today for: Chief Complaint Patient presents with  Follow-up DMII 1. Have you been to the ER, urgent care clinic since your last visit? Hospitalized since your last visit? No 
 
2. Have you seen or consulted any other health care providers outside of the [address] [place] [address] since your last visit? Include any pap smears or colon screening.  No

## 2018-10-18 NOTE — PROGRESS NOTES
HISTORY OF PRESENT ILLNESS Angela Devlin is a 70  y.o. female. HPI  
F/u for DM 2 after  June 29 2018 She looks a bit happier She improved compliance  On  meds She got the log She has lesser  sugars on log Old history :  
 
Her  got diagnosed with stage 4 lung cancer Weight loss of 4   lbs Her  is hospitalized twice in between and she is busy with him Not taking Tolerating tanzeum for cost reasons She has  been taking the basal bolus regimen She brought  the log Has low sugars in AM  
 
 
Review of Systems Constitutional: Negative. HENT: Negative. Eyes: Negative for pain and redness. Respiratory: Negative. Cardiovascular: Negative for chest pain, palpitations and leg swelling. Gastrointestinal: Negative. Negative for constipation. Genitourinary: Negative. Musculoskeletal: Negative for myalgias. Skin: Negative. Neurological: none Endo/Heme/Allergies: Negative. Psychiatric/Behavioral: Negative for depression and memory loss. The patient does not have insomnia. Physical Exam  
Constitutional: She is oriented to person, place, and time. She appears well-developed and well-nourished. HENT:  
Head: Normocephalic. Eyes: Conjunctivae and extraocular motions are normal. Pupils are equal, round, and reactive to light. Neck: Normal range of motion. Neck supple. No JVD present. No tracheal deviation present. No thyromegaly present. Cardiovascular: Normal rate, regular rhythm and normal heart sounds. No murmur heard. Pulmonary/Chest: Breath sounds normal.  
Abdominal: Soft. Bowel sounds are normal.  
Musculoskeletal: Normal range of motion. Left forearm fistula Lymphadenopathy:  
She has no cervical adenopathy. Neurological: She is alert and oriented to person, place, and time. She has normal reflexes. Skin: Skin is warm. Psychiatric: She is slightly depressed Diabetic foot exam: March 2018 Left: Reflexes nd Vibratory sensation diminished Proprioception nd Sharp/dull discrimination nd Filament test reduced sensation with micro filament Pulse DP: 2+ (normal) Pulse PT: nd 
 Deformities: None Right: Reflexes nd Vibratory sensation diminished Proprioception nd Sharp/dull discrimination nd Filament test reduced sensation with micro filament Pulse DP: 2+ (normal) Pulse PT: nd 
 Deformities: None Lab Results Component Value Date/Time Hemoglobin A1c 7.9 (H) 10/04/2018 07:21 AM  
 Hemoglobin A1c 7.4 (H) 06/15/2018 07:40 AM  
 Hemoglobin A1c 7.4 (H) 03/23/2018 07:21 AM  
 Microalb/Creat ratio (ug/mg creat.) 1,246.1 (H) 10/04/2018 07:21 AM  
 LDL, calculated 84 10/04/2018 07:21 AM  
 Creatinine 3.65 (H) 10/04/2018 07:21 AM  
 Hemoglobin A1c, External 8.7 07/29/2016 Lab Results Component Value Date/Time Cholesterol, total 159 10/04/2018 07:21 AM  
 HDL Cholesterol 48 10/04/2018 07:21 AM  
 LDL, calculated 84 10/04/2018 07:21 AM  
 Triglyceride 136 10/04/2018 07:21 AM  
 
Lab Results Component Value Date/Time AST (SGOT) 15 10/04/2018 07:21 AM  
 Alk. phosphatase 88 10/04/2018 07:21 AM  
 
Lab Results Component Value Date/Time GFR est AA 14 (L) 10/04/2018 07:21 AM  
 GFR est non-AA 12 (L) 10/04/2018 07:21 AM  
 Creatinine 3.65 (H) 10/04/2018 07:21 AM  
 BUN 55 (H) 10/04/2018 07:21 AM  
 Sodium 139 10/04/2018 07:21 AM  
 Potassium 3.9 10/04/2018 07:21 AM  
 Chloride 103 10/04/2018 07:21 AM  
 CO2 19 (L) 10/04/2018 07:21 AM  
  
 
 
ASSESSMENT and PLAN 1. DM 2 uncontrolled-:    A1C is   7.9  %   From  Oct 2018    Compared   To   7.4 %      From  June 2018      Compared      To    7.4 %         From march 2018     Compared to  7.5 %   From nov 22 2017    compared to   7.4 %   From     Aug 2017     Compared to  8.8 %   From feb 2017  Compared to    7.6 %     From  Nov 2016 compared to  8.7 %    From aug 2016  Compared to  7.9 %    From April 2016  Compared to   7.7 %    From monse 2016  Compared to   7.3 %       From nov 2015 corrected to     7.8 % From  ,    8.2 %    From July 2015   ( finger stick check is 7.6 ) compared to   8.4 %   From feb 2015 compared to   9.8 %   From dec 2014 compared to  9.1 %  From oct 2014 compared to    8 %  from May 2014 compared to 7.5 % from march 2014 compared to 9.8 % from jan 2014 compared to over 11 % from hospital ( going by her ) Slight loss of   glycemic control with all the stress she has with her  S/p gastric banding Off tradjenta 5 mg a day for cost   
 
Continue  on basal bolus regimen Encouraging patient to check more often She is even suggested to go for freestyle [place] 2. HTN :   well  controlled,  :  on lotrel , Hydralazine , and atenolol. 3. CKD : stage 4  f/u with Dr. Bairon Espinal She has fistula  On left upper extremity Stable 4. Elevated Liver enzymes- resolved 5. Obesity Body mass index is 40.9 kg/m². She cannot afford incretins at this time, so tanzeum is optional  
 
 
6. Hyperlipidemia : improve compliance on crestor  
 
 
 
> 50 % of time is spent on counseling Patient voiced understanding her plan of care

## 2019-02-03 LAB
ALBUMIN SERPL-MCNC: 4 G/DL (ref 3.6–4.8)
ALBUMIN/GLOB SERPL: 1.4 {RATIO} (ref 1.2–2.2)
ALP SERPL-CCNC: 82 IU/L (ref 39–117)
ALT SERPL-CCNC: 16 IU/L (ref 0–32)
AST SERPL-CCNC: 19 IU/L (ref 0–40)
BILIRUB SERPL-MCNC: 0.2 MG/DL (ref 0–1.2)
BUN SERPL-MCNC: 61 MG/DL (ref 8–27)
BUN/CREAT SERPL: 15 (ref 12–28)
CALCIUM SERPL-MCNC: 9.5 MG/DL (ref 8.7–10.3)
CHLORIDE SERPL-SCNC: 101 MMOL/L (ref 96–106)
CHOLEST SERPL-MCNC: 175 MG/DL (ref 100–199)
CO2 SERPL-SCNC: 19 MMOL/L (ref 20–29)
CREAT SERPL-MCNC: 3.95 MG/DL (ref 0.57–1)
EST. AVERAGE GLUCOSE BLD GHB EST-MCNC: 192 MG/DL
GLOBULIN SER CALC-MCNC: 2.9 G/DL (ref 1.5–4.5)
GLUCOSE SERPL-MCNC: 145 MG/DL (ref 65–99)
HBA1C MFR BLD: 8.3 % (ref 4.8–5.6)
HDLC SERPL-MCNC: 49 MG/DL
INTERPRETATION, 910389: NORMAL
INTERPRETATION: NORMAL
LDLC SERPL CALC-MCNC: 98 MG/DL (ref 0–99)
Lab: NORMAL
PDF IMAGE, 910387: NORMAL
POTASSIUM SERPL-SCNC: 4.1 MMOL/L (ref 3.5–5.2)
PROT SERPL-MCNC: 6.9 G/DL (ref 6–8.5)
SODIUM SERPL-SCNC: 141 MMOL/L (ref 134–144)
TRIGL SERPL-MCNC: 141 MG/DL (ref 0–149)
VLDLC SERPL CALC-MCNC: 28 MG/DL (ref 5–40)

## 2019-02-08 ENCOUNTER — OFFICE VISIT (OUTPATIENT)
Dept: ENDOCRINOLOGY | Age: 64
End: 2019-02-08

## 2019-02-08 VITALS
TEMPERATURE: 98.1 F | BODY MASS INDEX: 41.26 KG/M2 | OXYGEN SATURATION: 100 % | WEIGHT: 224.2 LBS | DIASTOLIC BLOOD PRESSURE: 87 MMHG | HEART RATE: 75 BPM | HEIGHT: 62 IN | RESPIRATION RATE: 18 BRPM | SYSTOLIC BLOOD PRESSURE: 173 MMHG

## 2019-02-08 DIAGNOSIS — E11.65 UNCONTROLLED TYPE 2 DIABETES MELLITUS WITH HYPERGLYCEMIA, WITH LONG-TERM CURRENT USE OF INSULIN (HCC): Primary | ICD-10-CM

## 2019-02-08 DIAGNOSIS — N18.4 STAGE 4 CHRONIC KIDNEY DISEASE (HCC): ICD-10-CM

## 2019-02-08 DIAGNOSIS — R80.1 PERSISTENT PROTEINURIA: ICD-10-CM

## 2019-02-08 DIAGNOSIS — E66.01 MORBID OBESITY (HCC): ICD-10-CM

## 2019-02-08 DIAGNOSIS — I10 ESSENTIAL HYPERTENSION: ICD-10-CM

## 2019-02-08 DIAGNOSIS — Z79.4 UNCONTROLLED TYPE 2 DIABETES MELLITUS WITH HYPERGLYCEMIA, WITH LONG-TERM CURRENT USE OF INSULIN (HCC): Primary | ICD-10-CM

## 2019-02-08 NOTE — PROGRESS NOTES
HISTORY OF PRESENT ILLNESS Song Rock is a 70  y.o. female. HPI  
F/u for DM 2 after Oct  2018 She looks a bit happier She improved compliance  On  meds She got the log She has lesser  sugars on log Old history :  
 
Her  got diagnosed with stage 4 lung cancer Weight loss of 4   lbs Her  is hospitalized twice in between and she is busy with him Not taking Tolerating tanzeum for cost reasons She has  been taking the basal bolus regimen She brought  the log Has low sugars in AM  
 
 
Review of Systems Constitutional: Negative. HENT: Negative. Eyes: Negative for pain and redness. Respiratory: Negative. Cardiovascular: Negative for chest pain, palpitations and leg swelling. Gastrointestinal: Negative. Negative for constipation. Genitourinary: Negative. Musculoskeletal: Negative for myalgias. Skin: Negative. Neurological: none Endo/Heme/Allergies: Negative. Psychiatric/Behavioral: Negative for depression and memory loss. The patient does not have insomnia. Physical Exam  
Constitutional: She is oriented to person, place, and time. She appears well-developed and well-nourished. HENT:  
Head: Normocephalic. Eyes: Conjunctivae and extraocular motions are normal. Pupils are equal, round, and reactive to light. Neck: Normal range of motion. Neck supple. No JVD present. No tracheal deviation present. No thyromegaly present. Cardiovascular: Normal rate, regular rhythm and normal heart sounds. No murmur heard. Pulmonary/Chest: Breath sounds normal.  
Abdominal: Soft. Bowel sounds are normal.  
Musculoskeletal: Normal range of motion. Left forearm fistula Lymphadenopathy:  
She has no cervical adenopathy. Neurological: She is alert and oriented to person, place, and time. She has normal reflexes. Skin: Skin is warm. Psychiatric: She is slightly depressed Diabetic foot exam: March 2018 Left: Reflexes nd Vibratory sensation diminished Proprioception nd Sharp/dull discrimination nd Filament test reduced sensation with micro filament Pulse DP: 2+ (normal) Pulse PT: nd 
 Deformities: None Right: Reflexes nd Vibratory sensation diminished Proprioception nd Sharp/dull discrimination nd Filament test reduced sensation with micro filament Pulse DP: 2+ (normal) Pulse PT: nd 
 Deformities: None Lab Results Component Value Date/Time Hemoglobin A1c 8.3 (H) 02/02/2019 07:55 AM  
 Hemoglobin A1c 7.9 (H) 10/04/2018 07:21 AM  
 Hemoglobin A1c 7.4 (H) 06/15/2018 07:40 AM  
 Microalb/Creat ratio (ug/mg creat.) 1,246.1 (H) 10/04/2018 07:21 AM  
 LDL, calculated 98 02/02/2019 07:55 AM  
 Creatinine 3.95 (H) 02/02/2019 07:55 AM  
 Hemoglobin A1c, External 8.7 07/29/2016 Lab Results Component Value Date/Time Cholesterol, total 175 02/02/2019 07:55 AM  
 HDL Cholesterol 49 02/02/2019 07:55 AM  
 LDL, calculated 98 02/02/2019 07:55 AM  
 Triglyceride 141 02/02/2019 07:55 AM  
 
Lab Results Component Value Date/Time AST (SGOT) 19 02/02/2019 07:55 AM  
 Alk. phosphatase 82 02/02/2019 07:55 AM  
 
Lab Results Component Value Date/Time GFR est AA 13 (L) 02/02/2019 07:55 AM  
 GFR est non-AA 11 (L) 02/02/2019 07:55 AM  
 Creatinine 3.95 (H) 02/02/2019 07:55 AM  
 BUN 61 (H) 02/02/2019 07:55 AM  
 Sodium 141 02/02/2019 07:55 AM  
 Potassium 4.1 02/02/2019 07:55 AM  
 Chloride 101 02/02/2019 07:55 AM  
 CO2 19 (L) 02/02/2019 07:55 AM  
  
 
 
ASSESSMENT and PLAN 1. DM 2 uncontrolled-:    A1C is   8.3 %  From   Feb 2019   Compared to   7.9  %   From  Oct 2018    Compared   To   7.4 %      From  June 2018      Compared      To    7.4 %         From march 2018     Compared to  7.5 %   From nov 22 2017    compared to   7.4 %   From     Aug 2017     Compared to  8.8 %   From feb 2017  Compared to    7.6 %     From  Nov 2016 compared to  8.7 %    From aug 2016  Compared to  7.9 %    From April 2016  Compared to   7.7 %    From jan 2016  Compared to   7.3 %       From nov 2015 corrected to     7.8 % From  ,    8.2 %    From July 2015   ( finger stick check is 7.6 ) compared to   8.4 %   From feb 2015 compared to   9.8 %   From dec 2014 compared to  9.1 %  From oct 2014 compared to    8 %  from May 2014 compared to 7.5 % from march 2014 compared to 9.8 % from jan 2014 compared to over 11 % from hospital ( going by her ) Losing control on   glycemic control Does not like to check often Non compliant with diet and insulins S/p gastric banding Off tradjenta 5 mg a day for cost   
 
Continue  on basal bolus regimen Encouraging patient to check more often She is even suggested to go for freestyle [place] 2. HTN :   well  controlled,  :  on lotrel , Hydralazine , and atenolol. 3. CKD : stage 4 - 5  
 f/u with Dr. Kt Ruby She has fistula  On left upper extremity Stable 4. Elevated Liver enzymes- resolved 5. Obesity Body mass index is 41.01 kg/m². She cannot afford incretins at this time, so tanzeum is optional  
 
 
6. Hyperlipidemia : improve compliance on crestor  
 
 
 
 
> 50 % of time is spent on counseling Patient voiced understanding her plan of care

## 2019-02-08 NOTE — PROGRESS NOTES
1. Have you been to the ER, urgent care clinic since your last visit? No  Hospitalized since your last visit? No 
 
2. Have you seen or consulted any other health care providers outside of the [address] [place] [address] since your last visit? Include any pap smears or colon screening. No  
 
Wt Readings from Last 3 Encounters:  
02/08/19 224 lb 3.2 oz (101.7 kg) 10/18/18 223 lb 9.6 oz (101.4 kg) 06/22/18 217 lb 12.8 oz (98.8 kg) Temp Readings from Last 3 Encounters:  
02/08/19 98.1 °F (36.7 °C) (Oral) 10/18/18 96.2 °F (35.7 °C) (Oral) 06/22/18 97.5 °F (36.4 °C) (Oral) BP Readings from Last 3 Encounters:  
02/08/19 173/87  
10/18/18 174/79  
06/22/18 144/65 Pulse Readings from Last 3 Encounters:  
02/08/19 75  
10/18/18 71  
06/22/18 64 Lab Results Component Value Date/Time Hemoglobin A1c 8.3 (H) 02/02/2019 07:55 AM  
 Hemoglobin A1c (POC) 7.3 11/05/2015 02:09 PM  
 Hemoglobin A1c, External 8.7 07/29/2016 Patient has meter today.

## 2019-02-13 ENCOUNTER — OP HISTORICAL/CONVERTED ENCOUNTER (OUTPATIENT)
Dept: OTHER | Age: 64
End: 2019-02-13

## 2019-04-12 ENCOUNTER — OP HISTORICAL/CONVERTED ENCOUNTER (OUTPATIENT)
Dept: OTHER | Age: 64
End: 2019-04-12

## 2019-05-19 DIAGNOSIS — N18.30 CKD (CHRONIC KIDNEY DISEASE) STAGE 3, GFR 30-59 ML/MIN (HCC): ICD-10-CM

## 2019-05-19 DIAGNOSIS — I10 ESSENTIAL HYPERTENSION: ICD-10-CM

## 2019-05-19 RX ORDER — INSULIN GLARGINE 100 [IU]/ML
INJECTION, SOLUTION SUBCUTANEOUS
Qty: 30 ML | Refills: 4 | Status: SHIPPED | OUTPATIENT
Start: 2019-05-19 | End: 2020-01-01 | Stop reason: SDUPTHER

## 2019-05-19 RX ORDER — AMLODIPINE BESYLATE AND BENAZEPRIL HYDROCHLORIDE 10; 40 MG/1; MG/1
CAPSULE ORAL
Qty: 90 CAP | Refills: 3 | Status: SHIPPED | OUTPATIENT
Start: 2019-05-19

## 2019-05-21 ENCOUNTER — OP HISTORICAL/CONVERTED ENCOUNTER (OUTPATIENT)
Dept: OTHER | Age: 64
End: 2019-05-21

## 2019-07-14 RX ORDER — ROSUVASTATIN CALCIUM 10 MG/1
TABLET, COATED ORAL
Qty: 90 TAB | Refills: 3 | Status: SHIPPED | OUTPATIENT
Start: 2019-07-14 | End: 2020-01-01 | Stop reason: SDUPTHER

## 2019-07-17 ENCOUNTER — OP HISTORICAL/CONVERTED ENCOUNTER (OUTPATIENT)
Dept: OTHER | Age: 64
End: 2019-07-17

## 2019-09-02 RX ORDER — INSULIN LISPRO 100 [IU]/ML
INJECTION, SOLUTION INTRAVENOUS; SUBCUTANEOUS
Qty: 45 ML | Refills: 4 | Status: SHIPPED | OUTPATIENT
Start: 2019-09-02 | End: 2020-01-01 | Stop reason: ALTCHOICE

## 2019-11-11 ENCOUNTER — IP HISTORICAL/CONVERTED ENCOUNTER (OUTPATIENT)
Dept: OTHER | Age: 64
End: 2019-11-11

## 2019-12-10 ENCOUNTER — ED HISTORICAL/CONVERTED ENCOUNTER (OUTPATIENT)
Dept: OTHER | Age: 64
End: 2019-12-10

## 2020-01-01 ENCOUNTER — ANESTHESIA (OUTPATIENT)
Dept: SURGERY | Age: 65
End: 2020-01-01
Payer: MEDICARE

## 2020-01-01 ENCOUNTER — APPOINTMENT (OUTPATIENT)
Dept: GENERAL RADIOLOGY | Age: 65
End: 2020-01-01
Attending: SURGERY
Payer: MEDICARE

## 2020-01-01 ENCOUNTER — VIRTUAL VISIT (OUTPATIENT)
Dept: ENDOCRINOLOGY | Age: 65
End: 2020-01-01
Payer: MEDICARE

## 2020-01-01 ENCOUNTER — HOSPITAL ENCOUNTER (OUTPATIENT)
Age: 65
Setting detail: OUTPATIENT SURGERY
Discharge: HOME | End: 2020-09-02
Attending: SURGERY | Admitting: SURGERY
Payer: MEDICARE

## 2020-01-01 ENCOUNTER — HOSPITAL ENCOUNTER (OUTPATIENT)
Dept: LAB | Age: 65
Discharge: HOME | End: 2020-10-02
Payer: MEDICARE

## 2020-01-01 ENCOUNTER — ANESTHESIA EVENT (OUTPATIENT)
Dept: SURGERY | Age: 65
End: 2020-01-01
Payer: MEDICARE

## 2020-01-01 ENCOUNTER — HOSPITAL ENCOUNTER (OUTPATIENT)
Age: 65
Setting detail: OUTPATIENT SURGERY
Discharge: HOME | End: 2020-06-24
Attending: SURGERY | Admitting: SURGERY
Payer: MEDICARE

## 2020-01-01 ENCOUNTER — HOSPITAL ENCOUNTER (OUTPATIENT)
Dept: LAB | Age: 65
Discharge: HOME | End: 2020-06-03
Payer: MEDICARE

## 2020-01-01 ENCOUNTER — HOSPITAL ENCOUNTER (OUTPATIENT)
Dept: MAMMOGRAPHY | Age: 65
Discharge: HOME | End: 2020-10-19
Attending: FAMILY MEDICINE
Payer: MEDICARE

## 2020-01-01 ENCOUNTER — VIRTUAL VISIT (OUTPATIENT)
Dept: ENDOCRINOLOGY | Age: 65
End: 2020-01-01

## 2020-01-01 VITALS
TEMPERATURE: 97.6 F | SYSTOLIC BLOOD PRESSURE: 126 MMHG | RESPIRATION RATE: 14 BRPM | WEIGHT: 208.34 LBS | OXYGEN SATURATION: 95 % | DIASTOLIC BLOOD PRESSURE: 52 MMHG | BODY MASS INDEX: 36.91 KG/M2 | HEIGHT: 63 IN | HEART RATE: 59 BPM

## 2020-01-01 VITALS
SYSTOLIC BLOOD PRESSURE: 178 MMHG | OXYGEN SATURATION: 97 % | BODY MASS INDEX: 36.76 KG/M2 | HEIGHT: 63 IN | HEART RATE: 69 BPM | TEMPERATURE: 98 F | RESPIRATION RATE: 20 BRPM | WEIGHT: 207.45 LBS | DIASTOLIC BLOOD PRESSURE: 63 MMHG

## 2020-01-01 DIAGNOSIS — Z99.2 HEMODIALYSIS STATUS (HCC): ICD-10-CM

## 2020-01-01 DIAGNOSIS — Z79.4 UNCONTROLLED TYPE 2 DIABETES MELLITUS WITH HYPERGLYCEMIA, WITH LONG-TERM CURRENT USE OF INSULIN (HCC): Primary | ICD-10-CM

## 2020-01-01 DIAGNOSIS — N28.9 RENAL INSUFFICIENCY: Primary | ICD-10-CM

## 2020-01-01 DIAGNOSIS — Z79.4 UNCONTROLLED TYPE 2 DIABETES MELLITUS WITH HYPERGLYCEMIA, WITH LONG-TERM CURRENT USE OF INSULIN (HCC): ICD-10-CM

## 2020-01-01 DIAGNOSIS — I10 ESSENTIAL HYPERTENSION: ICD-10-CM

## 2020-01-01 DIAGNOSIS — N18.6 ESRD (END STAGE RENAL DISEASE) (HCC): ICD-10-CM

## 2020-01-01 DIAGNOSIS — E11.65 UNCONTROLLED TYPE 2 DIABETES MELLITUS WITH HYPERGLYCEMIA, WITH LONG-TERM CURRENT USE OF INSULIN (HCC): Primary | ICD-10-CM

## 2020-01-01 DIAGNOSIS — E11.65 UNCONTROLLED TYPE 2 DIABETES MELLITUS WITH HYPERGLYCEMIA, WITH LONG-TERM CURRENT USE OF INSULIN (HCC): ICD-10-CM

## 2020-01-01 DIAGNOSIS — E78.2 MIXED HYPERLIPIDEMIA: ICD-10-CM

## 2020-01-01 DIAGNOSIS — N18.4 STAGE 4 CHRONIC KIDNEY DISEASE (HCC): Primary | ICD-10-CM

## 2020-01-01 DIAGNOSIS — Z12.31 SCREENING MAMMOGRAM FOR HIGH-RISK PATIENT: ICD-10-CM

## 2020-01-01 DIAGNOSIS — N18.30 CKD (CHRONIC KIDNEY DISEASE) STAGE 3, GFR 30-59 ML/MIN (HCC): ICD-10-CM

## 2020-01-01 LAB
ALBUMIN SERPL-MCNC: 4.3 G/DL (ref 3.8–4.8)
ALBUMIN SERPL-MCNC: 4.4 G/DL (ref 3.8–4.8)
ALBUMIN/CREAT UR: 582 MG/G CREAT (ref 0–29)
ALBUMIN/CREAT UR: 750 MG/G CREAT (ref 0–29)
ALBUMIN/GLOB SERPL: 1.7 {RATIO} (ref 1.2–2.2)
ALBUMIN/GLOB SERPL: 1.8 {RATIO} (ref 1.2–2.2)
ALP SERPL-CCNC: 117 IU/L (ref 39–117)
ALP SERPL-CCNC: 139 IU/L (ref 39–117)
ALT SERPL-CCNC: 11 IU/L (ref 0–32)
ALT SERPL-CCNC: 13 IU/L (ref 0–32)
ANION GAP BLD CALC-SCNC: 17 MMOL/L (ref 10–20)
ANION GAP BLD CALC-SCNC: 24 MMOL/L (ref 10–20)
AST SERPL-CCNC: 14 IU/L (ref 0–40)
AST SERPL-CCNC: 15 IU/L (ref 0–40)
BACTERIA SPEC CULT: ABNORMAL
BACTERIA SPEC CULT: ABNORMAL
BILIRUB SERPL-MCNC: 0.2 MG/DL (ref 0–1.2)
BILIRUB SERPL-MCNC: 0.2 MG/DL (ref 0–1.2)
BUN BLD-MCNC: 34 MG/DL (ref 9–20)
BUN BLD-MCNC: 41 MG/DL (ref 9–20)
BUN SERPL-MCNC: 24 MG/DL (ref 8–27)
BUN SERPL-MCNC: 52 MG/DL (ref 8–27)
BUN/CREAT SERPL: 10 (ref 12–28)
BUN/CREAT SERPL: 6 (ref 12–28)
CA-I BLD-MCNC: 1.19 MMOL/L (ref 1.12–1.32)
CA-I BLD-MCNC: 1.22 MMOL/L (ref 1.12–1.32)
CALCIUM SERPL-MCNC: 9.6 MG/DL (ref 8.7–10.3)
CALCIUM SERPL-MCNC: 9.9 MG/DL (ref 8.7–10.3)
CHLORIDE BLD-SCNC: 101 MMOL/L (ref 98–107)
CHLORIDE BLD-SCNC: 103 MMOL/L (ref 98–107)
CHLORIDE SERPL-SCNC: 100 MMOL/L (ref 96–106)
CHLORIDE SERPL-SCNC: 100 MMOL/L (ref 96–106)
CHOLEST SERPL-MCNC: 130 MG/DL (ref 100–199)
CHOLEST SERPL-MCNC: 162 MG/DL (ref 100–199)
CO2 BLD-SCNC: 22 MMOL/L (ref 21–32)
CO2 BLD-SCNC: 22 MMOL/L (ref 21–32)
CO2 SERPL-SCNC: 22 MMOL/L (ref 20–29)
CO2 SERPL-SCNC: 25 MMOL/L (ref 20–29)
CREAT BLD-MCNC: 4.6 MG/DL (ref 0.6–1.3)
CREAT BLD-MCNC: 5.1 MG/DL (ref 0.6–1.3)
CREAT SERPL-MCNC: 4.29 MG/DL (ref 0.57–1)
CREAT SERPL-MCNC: 4.98 MG/DL (ref 0.57–1)
CREAT UR-MCNC: 91 MG/DL
CREAT UR-MCNC: 97.7 MG/DL
EST. AVERAGE GLUCOSE BLD GHB EST-MCNC: 143 MG/DL
EST. AVERAGE GLUCOSE BLD GHB EST-MCNC: 157 MG/DL
GLOBULIN SER CALC-MCNC: 2.5 G/DL (ref 1.5–4.5)
GLOBULIN SER CALC-MCNC: 2.5 G/DL (ref 1.5–4.5)
GLUCOSE BLD STRIP.AUTO-MCNC: 175 MG/DL (ref 65–100)
GLUCOSE BLD STRIP.AUTO-MCNC: 92 MG/DL (ref 65–100)
GLUCOSE BLD-MCNC: 121 MG/DL (ref 65–100)
GLUCOSE BLD-MCNC: 191 MG/DL (ref 65–100)
GLUCOSE SERPL-MCNC: 108 MG/DL (ref 65–99)
GLUCOSE SERPL-MCNC: 166 MG/DL (ref 65–99)
HBA1C MFR BLD: 6.6 % (ref 4.8–5.6)
HBA1C MFR BLD: 7.1 % (ref 4.8–5.6)
HCT VFR BLD CALC: 32 % (ref 35–47)
HCT VFR BLD CALC: 32 % (ref 35–47)
HDLC SERPL-MCNC: 48 MG/DL
HDLC SERPL-MCNC: 56 MG/DL
INTERPRETATION, 910389: NORMAL
INTERPRETATION, 910389: NORMAL
INTERPRETATION: NORMAL
INTERPRETATION: NORMAL
LDLC SERPL CALC-MCNC: 53 MG/DL (ref 0–99)
LDLC SERPL CALC-MCNC: 91 MG/DL (ref 0–99)
Lab: NORMAL
Lab: NORMAL
MICROALBUMIN UR-MCNC: 530 UG/ML
MICROALBUMIN UR-MCNC: 732.5 UG/ML
PDF IMAGE, 910387: NORMAL
PDF IMAGE, 910387: NORMAL
POTASSIUM BLD-SCNC: 4.2 MMOL/L (ref 3.5–5.1)
POTASSIUM BLD-SCNC: 4.2 MMOL/L (ref 3.5–5.1)
POTASSIUM SERPL-SCNC: 4.5 MMOL/L (ref 3.5–5.2)
POTASSIUM SERPL-SCNC: 5 MMOL/L (ref 3.5–5.2)
PROT SERPL-MCNC: 6.8 G/DL (ref 6–8.5)
PROT SERPL-MCNC: 6.9 G/DL (ref 6–8.5)
SERVICE CMNT-IMP: ABNORMAL
SERVICE CMNT-IMP: NORMAL
SODIUM BLD-SCNC: 137 MMOL/L (ref 136–145)
SODIUM BLD-SCNC: 141 MMOL/L (ref 136–145)
SODIUM SERPL-SCNC: 140 MMOL/L (ref 134–144)
SODIUM SERPL-SCNC: 140 MMOL/L (ref 134–144)
TRIGL SERPL-MCNC: 114 MG/DL (ref 0–149)
TRIGL SERPL-MCNC: 115 MG/DL (ref 0–149)
VLDLC SERPL CALC-MCNC: 21 MG/DL (ref 5–40)
VLDLC SERPL CALC-MCNC: 23 MG/DL (ref 5–40)

## 2020-01-01 PROCEDURE — 77030031139 HC SUT VCRL2 J&J -A: Performed by: SURGERY

## 2020-01-01 PROCEDURE — G8427 DOCREV CUR MEDS BY ELIG CLIN: HCPCS | Performed by: INTERNAL MEDICINE

## 2020-01-01 PROCEDURE — 76210000006 HC OR PH I REC 0.5 TO 1 HR: Performed by: SURGERY

## 2020-01-01 PROCEDURE — 76060000039 HC ANESTHESIA 4 TO 4.5 HR: Performed by: SURGERY

## 2020-01-01 PROCEDURE — 83036 HEMOGLOBIN GLYCOSYLATED A1C: CPT

## 2020-01-01 PROCEDURE — 74011250636 HC RX REV CODE- 250/636

## 2020-01-01 PROCEDURE — 64418 NJX AA&/STRD SPRSCAP NRV: CPT | Performed by: ANESTHESIOLOGY

## 2020-01-01 PROCEDURE — 77030002986 HC SUT PROL J&J -A: Performed by: SURGERY

## 2020-01-01 PROCEDURE — 76210000020 HC REC RM PH II FIRST 0.5 HR: Performed by: SURGERY

## 2020-01-01 PROCEDURE — 77030040922 HC BLNKT HYPOTHRM STRY -A

## 2020-01-01 PROCEDURE — 36415 COLL VENOUS BLD VENIPUNCTURE: CPT

## 2020-01-01 PROCEDURE — C1768 GRAFT, VASCULAR: HCPCS | Performed by: SURGERY

## 2020-01-01 PROCEDURE — 80061 LIPID PANEL: CPT

## 2020-01-01 PROCEDURE — 74011250637 HC RX REV CODE- 250/637: Performed by: ANESTHESIOLOGY

## 2020-01-01 PROCEDURE — 76010000135 HC OR TIME 4 TO 4.5 HR: Performed by: SURGERY

## 2020-01-01 PROCEDURE — 76010000153 HC OR TIME 1.5 TO 2 HR: Performed by: SURGERY

## 2020-01-01 PROCEDURE — 3017F COLORECTAL CA SCREEN DOC REV: CPT | Performed by: INTERNAL MEDICINE

## 2020-01-01 PROCEDURE — 77030038692 HC WND DEB SYS IRMX -B: Performed by: SURGERY

## 2020-01-01 PROCEDURE — 77030010507 HC ADH SKN DERMBND J&J -B: Performed by: SURGERY

## 2020-01-01 PROCEDURE — G9899 SCRN MAM PERF RSLTS DOC: HCPCS | Performed by: INTERNAL MEDICINE

## 2020-01-01 PROCEDURE — 80053 COMPREHEN METABOLIC PANEL: CPT

## 2020-01-01 PROCEDURE — 82962 GLUCOSE BLOOD TEST: CPT

## 2020-01-01 PROCEDURE — 77030011640 HC PAD GRND REM COVD -A: Performed by: SURGERY

## 2020-01-01 PROCEDURE — 77030002933 HC SUT MCRYL J&J -A: Performed by: SURGERY

## 2020-01-01 PROCEDURE — 82043 UR ALBUMIN QUANTITATIVE: CPT

## 2020-01-01 PROCEDURE — 77030014008 HC SPNG HEMSTAT J&J -C: Performed by: SURGERY

## 2020-01-01 PROCEDURE — 74011250636 HC RX REV CODE- 250/636: Performed by: SURGERY

## 2020-01-01 PROCEDURE — 74011000250 HC RX REV CODE- 250: Performed by: SURGERY

## 2020-01-01 PROCEDURE — 74011250636 HC RX REV CODE- 250/636: Performed by: ANESTHESIOLOGY

## 2020-01-01 PROCEDURE — 99214 OFFICE O/P EST MOD 30 MIN: CPT | Performed by: INTERNAL MEDICINE

## 2020-01-01 PROCEDURE — G8432 DEP SCR NOT DOC, RNG: HCPCS | Performed by: INTERNAL MEDICINE

## 2020-01-01 PROCEDURE — 3044F HG A1C LEVEL LT 7.0%: CPT | Performed by: INTERNAL MEDICINE

## 2020-01-01 PROCEDURE — 74011000250 HC RX REV CODE- 250

## 2020-01-01 PROCEDURE — 77030002987 HC SUT PROL J&J -B: Performed by: SURGERY

## 2020-01-01 PROCEDURE — 2022F DILAT RTA XM EVC RTNOPTHY: CPT | Performed by: INTERNAL MEDICINE

## 2020-01-01 PROCEDURE — 80047 BASIC METABLC PNL IONIZED CA: CPT

## 2020-01-01 PROCEDURE — 77030018836 HC SOL IRR NACL ICUM -A: Performed by: SURGERY

## 2020-01-01 PROCEDURE — 76000 FLUOROSCOPY <1 HR PHYS/QHP: CPT

## 2020-01-01 PROCEDURE — C1887 CATHETER, GUIDING: HCPCS | Performed by: SURGERY

## 2020-01-01 PROCEDURE — C1876 STENT, NON-COA/NON-COV W/DEL: HCPCS | Performed by: SURGERY

## 2020-01-01 PROCEDURE — C1769 GUIDE WIRE: HCPCS | Performed by: SURGERY

## 2020-01-01 PROCEDURE — G9231 DOC ESRD DIA TRANS PREG: HCPCS | Performed by: INTERNAL MEDICINE

## 2020-01-01 PROCEDURE — 74011250636 HC RX REV CODE- 250/636: Performed by: NURSE ANESTHETIST, CERTIFIED REGISTERED

## 2020-01-01 PROCEDURE — 77030013058 HC DEV INFL ANGIO BSC -B: Performed by: SURGERY

## 2020-01-01 PROCEDURE — 1101F PT FALLS ASSESS-DOCD LE1/YR: CPT | Performed by: INTERNAL MEDICINE

## 2020-01-01 PROCEDURE — 1090F PRES/ABSN URINE INCON ASSESS: CPT | Performed by: INTERNAL MEDICINE

## 2020-01-01 PROCEDURE — 74011250637 HC RX REV CODE- 250/637: Performed by: SURGERY

## 2020-01-01 PROCEDURE — 77030002996 HC SUT SLK J&J -A: Performed by: SURGERY

## 2020-01-01 PROCEDURE — 74011000636 HC RX REV CODE- 636: Performed by: SURGERY

## 2020-01-01 PROCEDURE — C1894 INTRO/SHEATH, NON-LASER: HCPCS | Performed by: SURGERY

## 2020-01-01 PROCEDURE — 77030004561 HC CATH ANGI DX COBRA ANGI -B: Performed by: SURGERY

## 2020-01-01 PROCEDURE — 73060 X-RAY EXAM OF HUMERUS: CPT

## 2020-01-01 PROCEDURE — G8400 PT W/DXA NO RESULTS DOC: HCPCS | Performed by: INTERNAL MEDICINE

## 2020-01-01 PROCEDURE — C1725 CATH, TRANSLUMIN NON-LASER: HCPCS | Performed by: SURGERY

## 2020-01-01 PROCEDURE — 77067 SCR MAMMO BI INCL CAD: CPT

## 2020-01-01 PROCEDURE — 77030008462 HC STPLR SKN PROX J&J -A: Performed by: SURGERY

## 2020-01-01 PROCEDURE — 76210000016 HC OR PH I REC 1 TO 1.5 HR: Performed by: SURGERY

## 2020-01-01 PROCEDURE — C1892 INTRO/SHEATH,FIXED,PEEL-AWAY: HCPCS | Performed by: SURGERY

## 2020-01-01 PROCEDURE — 77030020256 HC SOL INJ NACL 0.9%  500ML: Performed by: SURGERY

## 2020-01-01 PROCEDURE — 76060000034 HC ANESTHESIA 1.5 TO 2 HR: Performed by: SURGERY

## 2020-01-01 PROCEDURE — 77030008584 HC TOOL GDWRE DEV TERU -A: Performed by: SURGERY

## 2020-01-01 DEVICE — GRAFT VASC L40CM ID6MM THN WALLED CBAS HEP SURF PROPATEN: Type: IMPLANTABLE DEVICE | Site: ARM | Status: FUNCTIONAL

## 2020-01-01 DEVICE — PREMOUNTED STENT SYSTEM
Type: IMPLANTABLE DEVICE | Site: SUBCLAVIAN | Status: FUNCTIONAL
Brand: EXPRESS® LD ILIAC / BILIARY

## 2020-01-01 RX ORDER — PROTAMINE SULFATE 10 MG/ML
INJECTION, SOLUTION INTRAVENOUS AS NEEDED
Status: DISCONTINUED | OUTPATIENT
Start: 2020-01-01 | End: 2020-01-01 | Stop reason: HOSPADM

## 2020-01-01 RX ORDER — LIDOCAINE HYDROCHLORIDE 10 MG/ML
0.1 INJECTION, SOLUTION EPIDURAL; INFILTRATION; INTRACAUDAL; PERINEURAL AS NEEDED
Status: DISCONTINUED | OUTPATIENT
Start: 2020-01-01 | End: 2020-01-01 | Stop reason: HOSPADM

## 2020-01-01 RX ORDER — FENTANYL CITRATE 50 UG/ML
INJECTION, SOLUTION INTRAMUSCULAR; INTRAVENOUS AS NEEDED
Status: DISCONTINUED | OUTPATIENT
Start: 2020-01-01 | End: 2020-01-01 | Stop reason: HOSPADM

## 2020-01-01 RX ORDER — HYDRALAZINE HYDROCHLORIDE 20 MG/ML
INJECTION INTRAMUSCULAR; INTRAVENOUS AS NEEDED
Status: DISCONTINUED | OUTPATIENT
Start: 2020-01-01 | End: 2020-01-01 | Stop reason: HOSPADM

## 2020-01-01 RX ORDER — VANCOMYCIN/0.9 % SOD CHLORIDE 1.5G/250ML
1500 PLASTIC BAG, INJECTION (ML) INTRAVENOUS ONCE
Status: COMPLETED | OUTPATIENT
Start: 2020-01-01 | End: 2020-01-01

## 2020-01-01 RX ORDER — SODIUM CHLORIDE 0.9 % (FLUSH) 0.9 %
5-40 SYRINGE (ML) INJECTION EVERY 8 HOURS
Status: DISCONTINUED | OUTPATIENT
Start: 2020-01-01 | End: 2020-01-01 | Stop reason: HOSPADM

## 2020-01-01 RX ORDER — MORPHINE SULFATE 10 MG/ML
2 INJECTION, SOLUTION INTRAMUSCULAR; INTRAVENOUS
Status: DISCONTINUED | OUTPATIENT
Start: 2020-01-01 | End: 2020-01-01 | Stop reason: HOSPADM

## 2020-01-01 RX ORDER — OXYCODONE AND ACETAMINOPHEN 5; 325 MG/1; MG/1
1 TABLET ORAL
Qty: 24 TAB | Refills: 0 | Status: SHIPPED | OUTPATIENT
Start: 2020-01-01 | End: 2020-01-01

## 2020-01-01 RX ORDER — ROPIVACAINE HYDROCHLORIDE 5 MG/ML
INJECTION, SOLUTION EPIDURAL; INFILTRATION; PERINEURAL AS NEEDED
Status: DISCONTINUED | OUTPATIENT
Start: 2020-01-01 | End: 2020-01-01 | Stop reason: HOSPADM

## 2020-01-01 RX ORDER — ROSUVASTATIN CALCIUM 10 MG/1
TABLET, COATED ORAL
Qty: 90 TAB | Refills: 4 | Status: SHIPPED | OUTPATIENT
Start: 2020-01-01

## 2020-01-01 RX ORDER — INSULIN GLARGINE 100 [IU]/ML
INJECTION, SOLUTION SUBCUTANEOUS
Qty: 30 ML | Refills: 4 | Status: SHIPPED | OUTPATIENT
Start: 2020-01-01 | End: 2020-01-01

## 2020-01-01 RX ORDER — MIDAZOLAM HYDROCHLORIDE 1 MG/ML
1 INJECTION, SOLUTION INTRAMUSCULAR; INTRAVENOUS AS NEEDED
Status: COMPLETED | OUTPATIENT
Start: 2020-01-01 | End: 2020-01-01

## 2020-01-01 RX ORDER — FENTANYL CITRATE 50 UG/ML
50 INJECTION, SOLUTION INTRAMUSCULAR; INTRAVENOUS AS NEEDED
Status: DISCONTINUED | OUTPATIENT
Start: 2020-01-01 | End: 2020-01-01 | Stop reason: HOSPADM

## 2020-01-01 RX ORDER — HEPARIN SODIUM 1000 [USP'U]/ML
1900 INJECTION, SOLUTION INTRAVENOUS; SUBCUTANEOUS ONCE
Status: CANCELLED | OUTPATIENT
Start: 2020-01-01 | End: 2020-01-01

## 2020-01-01 RX ORDER — HEPARIN SODIUM 5000 [USP'U]/ML
INJECTION, SOLUTION INTRAVENOUS; SUBCUTANEOUS AS NEEDED
Status: DISCONTINUED | OUTPATIENT
Start: 2020-01-01 | End: 2020-01-01 | Stop reason: HOSPADM

## 2020-01-01 RX ORDER — ONDANSETRON 2 MG/ML
INJECTION INTRAMUSCULAR; INTRAVENOUS AS NEEDED
Status: DISCONTINUED | OUTPATIENT
Start: 2020-01-01 | End: 2020-01-01 | Stop reason: HOSPADM

## 2020-01-01 RX ORDER — PROPOFOL 10 MG/ML
INJECTION, EMULSION INTRAVENOUS
Status: DISCONTINUED | OUTPATIENT
Start: 2020-01-01 | End: 2020-01-01 | Stop reason: HOSPADM

## 2020-01-01 RX ORDER — FENTANYL CITRATE 50 UG/ML
25 INJECTION, SOLUTION INTRAMUSCULAR; INTRAVENOUS
Status: DISCONTINUED | OUTPATIENT
Start: 2020-01-01 | End: 2020-01-01 | Stop reason: HOSPADM

## 2020-01-01 RX ORDER — SODIUM CHLORIDE 0.9 % (FLUSH) 0.9 %
5-40 SYRINGE (ML) INJECTION AS NEEDED
Status: DISCONTINUED | OUTPATIENT
Start: 2020-01-01 | End: 2020-01-01 | Stop reason: HOSPADM

## 2020-01-01 RX ORDER — ASPIRIN 81 MG/1
81 TABLET ORAL DAILY
COMMUNITY

## 2020-01-01 RX ORDER — PHENYLEPHRINE HCL IN 0.9% NACL 0.4MG/10ML
SYRINGE (ML) INTRAVENOUS AS NEEDED
Status: DISCONTINUED | OUTPATIENT
Start: 2020-01-01 | End: 2020-01-01 | Stop reason: HOSPADM

## 2020-01-01 RX ORDER — OXYCODONE AND ACETAMINOPHEN 5; 325 MG/1; MG/1
1 TABLET ORAL
Qty: 15 TAB | Refills: 0 | Status: SHIPPED | OUTPATIENT
Start: 2020-01-01 | End: 2020-01-01

## 2020-01-01 RX ORDER — CHOLECALCIFEROL (VITAMIN D3) 125 MCG
CAPSULE ORAL
COMMUNITY
End: 2020-01-01 | Stop reason: CLARIF

## 2020-01-01 RX ORDER — SODIUM CHLORIDE, SODIUM LACTATE, POTASSIUM CHLORIDE, CALCIUM CHLORIDE 600; 310; 30; 20 MG/100ML; MG/100ML; MG/100ML; MG/100ML
25 INJECTION, SOLUTION INTRAVENOUS CONTINUOUS
Status: DISCONTINUED | OUTPATIENT
Start: 2020-01-01 | End: 2020-01-01 | Stop reason: HOSPADM

## 2020-01-01 RX ORDER — SODIUM CHLORIDE 9 MG/ML
25 INJECTION, SOLUTION INTRAVENOUS CONTINUOUS
Status: DISCONTINUED | OUTPATIENT
Start: 2020-01-01 | End: 2020-01-01 | Stop reason: HOSPADM

## 2020-01-01 RX ORDER — HEPARIN SODIUM 1000 [USP'U]/ML
INJECTION, SOLUTION INTRAVENOUS; SUBCUTANEOUS
Status: COMPLETED
Start: 2020-01-01 | End: 2020-01-01

## 2020-01-01 RX ORDER — ATENOLOL 50 MG/1
50 TABLET ORAL DAILY
Qty: 90 TAB | Refills: 4 | Status: SHIPPED | OUTPATIENT
Start: 2020-01-01

## 2020-01-01 RX ORDER — HEPARIN SODIUM 1000 [USP'U]/ML
1900 INJECTION, SOLUTION INTRAVENOUS; SUBCUTANEOUS ONCE
Status: COMPLETED | OUTPATIENT
Start: 2020-01-01 | End: 2020-01-01

## 2020-01-01 RX ORDER — ONDANSETRON 2 MG/ML
4 INJECTION INTRAMUSCULAR; INTRAVENOUS AS NEEDED
Status: DISCONTINUED | OUTPATIENT
Start: 2020-01-01 | End: 2020-01-01 | Stop reason: HOSPADM

## 2020-01-01 RX ORDER — MIDAZOLAM HYDROCHLORIDE 1 MG/ML
INJECTION, SOLUTION INTRAMUSCULAR; INTRAVENOUS AS NEEDED
Status: DISCONTINUED | OUTPATIENT
Start: 2020-01-01 | End: 2020-01-01 | Stop reason: HOSPADM

## 2020-01-01 RX ORDER — HEPARIN SODIUM 1000 [USP'U]/ML
INJECTION, SOLUTION INTRAVENOUS; SUBCUTANEOUS AS NEEDED
Status: DISCONTINUED | OUTPATIENT
Start: 2020-01-01 | End: 2020-01-01 | Stop reason: HOSPADM

## 2020-01-01 RX ORDER — HYDROMORPHONE HYDROCHLORIDE 2 MG/ML
0.2 INJECTION, SOLUTION INTRAMUSCULAR; INTRAVENOUS; SUBCUTANEOUS
Status: DISCONTINUED | OUTPATIENT
Start: 2020-01-01 | End: 2020-01-01 | Stop reason: HOSPADM

## 2020-01-01 RX ORDER — SODIUM CHLORIDE, SODIUM LACTATE, POTASSIUM CHLORIDE, CALCIUM CHLORIDE 600; 310; 30; 20 MG/100ML; MG/100ML; MG/100ML; MG/100ML
50 INJECTION, SOLUTION INTRAVENOUS CONTINUOUS
Status: DISCONTINUED | OUTPATIENT
Start: 2020-01-01 | End: 2020-01-01 | Stop reason: HOSPADM

## 2020-01-01 RX ORDER — HEPARIN SODIUM 1000 [USP'U]/ML
INJECTION, SOLUTION INTRAVENOUS; SUBCUTANEOUS
Status: DISCONTINUED
Start: 2020-01-01 | End: 2020-01-01 | Stop reason: HOSPADM

## 2020-01-01 RX ORDER — INSULIN GLARGINE 100 [IU]/ML
INJECTION, SOLUTION SUBCUTANEOUS
Qty: 15 ML | Refills: 4 | Status: SHIPPED | OUTPATIENT
Start: 2020-01-01

## 2020-01-01 RX ORDER — ACETAMINOPHEN 325 MG/1
650 TABLET ORAL ONCE
Status: COMPLETED | OUTPATIENT
Start: 2020-01-01 | End: 2020-01-01

## 2020-01-01 RX ORDER — CLOPIDOGREL BISULFATE 75 MG/1
75 TABLET ORAL DAILY
COMMUNITY

## 2020-01-01 RX ORDER — ROPIVACAINE HYDROCHLORIDE 5 MG/ML
INJECTION, SOLUTION EPIDURAL; INFILTRATION; PERINEURAL
Status: COMPLETED | OUTPATIENT
Start: 2020-01-01 | End: 2020-01-01

## 2020-01-01 RX ORDER — SODIUM CHLORIDE 9 MG/ML
INJECTION, SOLUTION INTRAVENOUS
Status: DISCONTINUED | OUTPATIENT
Start: 2020-01-01 | End: 2020-01-01 | Stop reason: HOSPADM

## 2020-01-01 RX ORDER — DIPHENHYDRAMINE HYDROCHLORIDE 50 MG/ML
12.5 INJECTION, SOLUTION INTRAMUSCULAR; INTRAVENOUS
Status: DISCONTINUED | OUTPATIENT
Start: 2020-01-01 | End: 2020-01-01 | Stop reason: HOSPADM

## 2020-01-01 RX ADMIN — PROTAMINE SULFATE 20 MG: 10 INJECTION, SOLUTION INTRAVENOUS at 15:05

## 2020-01-01 RX ADMIN — ACETAMINOPHEN 650 MG: 325 TABLET ORAL at 12:00

## 2020-01-01 RX ADMIN — VANCOMYCIN HYDROCHLORIDE 1500 MG: 10 INJECTION, POWDER, LYOPHILIZED, FOR SOLUTION INTRAVENOUS at 07:00

## 2020-01-01 RX ADMIN — HEPARIN SODIUM 1900 UNITS: 1000 INJECTION INTRAVENOUS; SUBCUTANEOUS at 16:30

## 2020-01-01 RX ADMIN — FENTANYL CITRATE 25 MCG: 50 INJECTION, SOLUTION INTRAMUSCULAR; INTRAVENOUS at 12:29

## 2020-01-01 RX ADMIN — SODIUM CHLORIDE: 900 INJECTION, SOLUTION INTRAVENOUS at 07:41

## 2020-01-01 RX ADMIN — Medication 3 AMPULE: at 07:09

## 2020-01-01 RX ADMIN — MIDAZOLAM HYDROCHLORIDE 1 MG: 1 INJECTION, SOLUTION INTRAMUSCULAR; INTRAVENOUS at 07:22

## 2020-01-01 RX ADMIN — HEPARIN SODIUM 2000 UNITS: 5000 INJECTION, SOLUTION INTRAVENOUS; SUBCUTANEOUS at 10:13

## 2020-01-01 RX ADMIN — SODIUM CHLORIDE 25 ML/HR: 900 INJECTION, SOLUTION INTRAVENOUS at 07:10

## 2020-01-01 RX ADMIN — HEPARIN SODIUM 5000 UNITS: 1000 INJECTION, SOLUTION INTRAVENOUS; SUBCUTANEOUS at 14:31

## 2020-01-01 RX ADMIN — PROTAMINE SULFATE 10 MG: 10 INJECTION, SOLUTION INTRAVENOUS at 11:22

## 2020-01-01 RX ADMIN — VANCOMYCIN HYDROCHLORIDE 1500 MG: 10 INJECTION, POWDER, LYOPHILIZED, FOR SOLUTION INTRAVENOUS at 12:10

## 2020-01-01 RX ADMIN — SODIUM CHLORIDE 25 ML/HR: 900 INJECTION, SOLUTION INTRAVENOUS at 12:10

## 2020-01-01 RX ADMIN — MIDAZOLAM HYDROCHLORIDE 0.5 MG: 1 INJECTION, SOLUTION INTRAMUSCULAR; INTRAVENOUS at 13:36

## 2020-01-01 RX ADMIN — Medication 40 MCG: at 11:19

## 2020-01-01 RX ADMIN — FENTANYL CITRATE 50 MCG: 50 INJECTION, SOLUTION INTRAMUSCULAR; INTRAVENOUS at 12:51

## 2020-01-01 RX ADMIN — MIDAZOLAM HYDROCHLORIDE 1 MG: 1 INJECTION, SOLUTION INTRAMUSCULAR; INTRAVENOUS at 12:51

## 2020-01-01 RX ADMIN — ROPIVACAINE HYDROCHLORIDE 20 ML: 5 INJECTION, SOLUTION EPIDURAL; INFILTRATION; PERINEURAL at 12:53

## 2020-01-01 RX ADMIN — PROTAMINE SULFATE 30 MG: 10 INJECTION, SOLUTION INTRAVENOUS at 11:11

## 2020-01-01 RX ADMIN — PROPOFOL 35 MCG/KG/MIN: 10 INJECTION, EMULSION INTRAVENOUS at 13:33

## 2020-01-01 RX ADMIN — SODIUM CHLORIDE: 900 INJECTION, SOLUTION INTRAVENOUS at 11:45

## 2020-01-01 RX ADMIN — SODIUM CHLORIDE, POTASSIUM CHLORIDE, SODIUM LACTATE AND CALCIUM CHLORIDE: 600; 310; 30; 20 INJECTION, SOLUTION INTRAVENOUS at 13:33

## 2020-01-01 RX ADMIN — ONDANSETRON HYDROCHLORIDE 4 MG: 2 INJECTION, SOLUTION INTRAMUSCULAR; INTRAVENOUS at 07:48

## 2020-01-01 RX ADMIN — WATER 2 G: 1 INJECTION INTRAMUSCULAR; INTRAVENOUS; SUBCUTANEOUS at 13:54

## 2020-01-01 RX ADMIN — FENTANYL CITRATE 50 MCG: 50 INJECTION, SOLUTION INTRAMUSCULAR; INTRAVENOUS at 07:22

## 2020-01-01 RX ADMIN — Medication 80 MCG: at 08:42

## 2020-01-01 RX ADMIN — Medication 80 MCG: at 11:13

## 2020-01-01 RX ADMIN — PROPOFOL 80 MCG/KG/MIN: 10 INJECTION, EMULSION INTRAVENOUS at 07:48

## 2020-01-01 RX ADMIN — MIDAZOLAM HYDROCHLORIDE 0.5 MG: 1 INJECTION, SOLUTION INTRAMUSCULAR; INTRAVENOUS at 14:56

## 2020-01-01 RX ADMIN — ONDANSETRON HYDROCHLORIDE 4 MG: 2 INJECTION, SOLUTION INTRAMUSCULAR; INTRAVENOUS at 15:21

## 2020-01-01 RX ADMIN — HYDRALAZINE HYDROCHLORIDE 5 MG: 20 INJECTION INTRAMUSCULAR; INTRAVENOUS at 15:15

## 2020-01-01 RX ADMIN — HEPARIN SODIUM 1900 UNITS: 1000 INJECTION INTRAVENOUS; SUBCUTANEOUS at 14:18

## 2020-01-01 RX ADMIN — Medication 40 MCG: at 11:22

## 2020-01-01 RX ADMIN — HEPARIN SODIUM 7000 UNITS: 5000 INJECTION, SOLUTION INTRAVENOUS; SUBCUTANEOUS at 09:17

## 2020-01-01 RX ADMIN — MIDAZOLAM HYDROCHLORIDE 1 MG: 1 INJECTION, SOLUTION INTRAMUSCULAR; INTRAVENOUS at 07:40

## 2020-01-01 RX ADMIN — ROPIVACAINE HYDROCHLORIDE 30 ML: 5 INJECTION, SOLUTION EPIDURAL; INFILTRATION; PERINEURAL at 07:33

## 2020-01-01 RX ADMIN — Medication 40 MCG: at 08:12

## 2020-01-01 RX ADMIN — Medication 80 MCG: at 11:34

## 2020-01-01 RX ADMIN — Medication 3 AMPULE: at 12:00

## 2020-02-14 ENCOUNTER — IP HISTORICAL/CONVERTED ENCOUNTER (OUTPATIENT)
Dept: OTHER | Age: 65
End: 2020-02-14

## 2020-06-08 NOTE — PROGRESS NOTES
**THIS IS A VIRTUAL VISIT VIA AUDIO- VIDEO SYNCHRONOUS DISCUSSION. PATIENT AGREED TO HAVE THEIR CARE DELIVERED OVER A [employer]. ME VIDEO VISIT IN PLACE OF THEIR REGULARLY SCHEDULED OFFICE VISIT** Pt  is aware that this is a billable encounter and is responsible for copays/deductibles Patient gave a verbal consent to proceed with virtual video visit Patient is at home and I, the provider,  am at the office care diabetes and endocrinology HISTORY OF PRESENT ILLNESS Patrice Kee is a 75  y.o. female. HPI  
F/u for DM 2 after feb 2019 In the interim,  Started on HD  In  April 2019 Struggling with access issues for HD Reporting  Low BP - problems as well Old hsitory She looks a bit happier She improved compliance  On  meds She got the log She has lesser  sugars on log Old history :  
 
Her  got diagnosed with stage 4 lung cancer Weight loss of 4   lbs Her  is hospitalized twice in between and she is busy with him Not taking Tolerating tanzeum for cost reasons She has  been taking the basal bolus regimen She brought  the log Has low sugars in AM  
 
 
 
 
 
 
 
 
Review of Systems Constitutional: Negative. HENT: Negative. Eyes: Negative for pain and redness. Respiratory: Negative. Cardiovascular: Negative for chest pain, palpitations and leg swelling. Gastrointestinal: Negative. Negative for constipation. Genitourinary: Negative. Musculoskeletal: Negative for myalgias. Skin: Negative. Neurological: Negative. Endo/Heme/Allergies: Negative. Psychiatric/Behavioral: Negative for depression and memory loss. The patient does not have insomnia. Physical Exam  
Constitutional: oriented to person, place, and time. appears well-developed and well-nourished. HENT:  
Head: Normocephalic. Eyes: normal , noted no swelling or redness. Neck: Normal range of motion. Cardiovascular: could nto be examined, left hand fistula Pulmonary/Chest: appears breathing effortlessly Abdominal: Soft. Musculoskeletal: appears relatively nprmal  range of motion. Neurological: He is alert and oriented to person, place, and time. Appears to have no focal deficits Psychiatric: He has a normal mood and affect. Lab Results Component Value Date/Time Hemoglobin A1c 7.1 (H) 06/03/2020 07:19 AM  
 Hemoglobin A1c 8.3 (H) 02/02/2019 07:55 AM  
 Hemoglobin A1c 7.9 (H) 10/04/2018 07:21 AM  
 Microalb/Creat ratio (ug/mg creat.) 750 (H) 06/03/2020 07:19 AM  
 LDL, calculated 91 06/03/2020 07:19 AM  
 Creatinine 4.98 (H) 06/03/2020 07:19 AM  
 Hemoglobin A1c, External 8.7 07/29/2016 Lab Results Component Value Date/Time Cholesterol, total 162 06/03/2020 07:19 AM  
 HDL Cholesterol 48 06/03/2020 07:19 AM  
 LDL, calculated 91 06/03/2020 07:19 AM  
 Triglyceride 114 06/03/2020 07:19 AM  
 
Lab Results Component Value Date/Time Alk. phosphatase 117 06/03/2020 07:19 AM  
 
Lab Results Component Value Date/Time GFR est AA 10 (L) 06/03/2020 07:19 AM  
 GFR est non-AA 8 (L) 06/03/2020 07:19 AM  
 Creatinine 4.98 (H) 06/03/2020 07:19 AM  
 BUN 52 (H) 06/03/2020 07:19 AM  
 Sodium 140 06/03/2020 07:19 AM  
 Potassium 5.0 06/03/2020 07:19 AM  
 Chloride 100 06/03/2020 07:19 AM  
 CO2 22 06/03/2020 07:19 AM  
  
 
 
ASSESSMENT and PLAN 1. DM 2 uncontrolled-:    A1C is   7.1 %     From   June 2020    compared to   8.3 %  From   Feb 2019   Compared to   7.9  %   From  Oct 2018    Compared   To   7.4 %      From  June 2018      Compared      To    7.4 %         From march 2018     Compared to  7.5 %   From nov 22 2017    compared to   7.4 %   From     Aug 2017     Compared to  8.8 %   From feb 2017  Compared to    7.6 %     From  Nov 2016 compared to  8.7 %    From aug 2016  Compared to  7.9 %    From April 2016  Compared to   7.7 % From jan 2016  Compared to   7.3 %       From nov 2015 corrected to     7.8 % From  ,    8.2 %    From July 2015 June 2020  : She is doing well since dialysis began Lost 20 lbs ( fluid weight  Loss ) Reviewed her log, much better ,   Occasional low sugars Recommending  To stop humalog Decrease lantus Feb 2020  :  
 
Losing control on   glycemic control Does not like to check often Non compliant with diet and insulins S/p gastric banding Off tradjenta 5 mg a day for cost   
Continue  on basal bolus regimen Encouraging patient to check more often She is even suggested to go for freestyle [place] 2. HTN :   Has drops of  BP   :  on lotrel , Hydralazine , and atenolol 100 mg Decreased Atenolol to 50 mg a day . 3. ESRD -   On HD She has fistula  On left upper extremity , not helpful,  
 
 
4. Hyperlipidemia : improve compliance on crestor Pursuant  To the Emergency declaration under the [employer] and the [place] 20209 waiver authority and the [facility], to reduce the patient's risk of exposure to  COVID-19 and provide continuity of care for an established patient.

## 2020-06-08 NOTE — PROGRESS NOTES
1. Have you been to the ER, urgent care clinic since your last visit? No  Hospitalized since your last visit? No 
 
2. Have you seen or consulted any other health care providers outside of the [address] [place] [address] since your last visit? Include any pap smears or colon screening.  No

## 2020-06-08 NOTE — PATIENT INSTRUCTIONS
Do not skip meals Do not eat in between meals Reduce carbs- pasta, rice, potatoes, bread Do not drink juices or sodas Donot eat peanut butter Do not eat sugar free cookies and cakes Do not eat peaches, grapes, mangoes, pine apples, oranges,  Tangerines Decrease atenolol to 50 mg a day Refill crestor Check blood sugars immediately before each meal and at bedtime 
 
 decrease  lantus  24   units  After  11  AM ,   
 
STOP  HUMALOG

## 2020-06-17 NOTE — PERIOP NOTES
[facility] Preoperative Instructions Surgery Date 6/24/20          Time of Arrival 0730 1. On the day of your surgery, please report to the Surgical Services Registration Desk and sign in at your designated time. The Surgery Center is located to the right of the Emergency Room. 2. You must have someone with you to drive you home. You should not drive a car for 24 hours following surgery. Please make arrangements for a friend or family member to stay with you for the first 24 hours after your surgery. 3. Do not have anything to eat or drink (including water, gum, mints, coffee, juice) after midnight 6/21/20?? Heddy  ? This may not apply to medications prescribed by your physician. ?(Please note below the special instructions with medications to take the morning of your procedure.) 4. We recommend you do not drink any alcoholic beverages for 24 hours before and after your surgery. 5. Contact your surgeons office for instructions on the following medications: non-steroidal anti-inflammatory drugs (i.e. Advil, Aleve), vitamins, and supplements. (Some surgeons will want you to stop these medications prior to surgery and others may allow you to take them) **If you are currently taking Plavix, Coumadin, Aspirin and/or other blood-thinning agents, contact your surgeon for instructions. ** Your surgeon will partner with the physician prescribing these medications to determine if it is safe to stop or if you need to continue taking. Please do not stop taking these medications without instructions from your surgeon 6. Wear comfortable clothes. Wear glasses instead of contacts. Do not bring any money or jewelry. Please bring picture ID, insurance card, and any prearranged co-payment or hospital payment. Do not wear make-up, particularly mascara the morning of your surgery. Do not wear nail polish, particularly if you are having foot /hand surgery.   Wear your hair loose or down, no ponytails, buns, rena pins or clips. All body piercings must be removed. Please shower with antibacterial soap for three consecutive days before and on the morning of surgery, but do not apply any lotions, powders or deodorants after the shower on the day of surgery. Please use a fresh towels after each shower. Please sleep in clean clothes and change bed linens the night before surgery. Please do not shave for 48 hours prior to surgery. Shaving of the face is acceptable. 7. You should understand that if you do not follow these instructions your surgery may be cancelled. If your physical condition changes (I.e. fever, cold or flu) please contact your surgeon as soon as possible. 8. It is important that you be on time. If a situation occurs where you may be late, please call [phone] (OR Holding Area). 9. If you have any questions and or problems, please call [phone] (Pre-admission Testing). 10. Your surgery time may be subject to change. You will receive a phone call the evening prior if your time changes. 11.  If having outpatient surgery, you must have someone to drive you here, stay with you during the duration of your stay, and to drive you home at time of discharge. 12.   In an effort to improve the efficiency, privacy, and safety for all of our Pre-op patients visitors are not allowed in the Holding area. Once you arrive and are registered your family/visitors will be asked to remain in your vehicle. The Pre-op staff will call you when they are ready for you to enter the building and will explain to you and your family/visitors that the Pre-op phase is beginning. At this time, if your procedure is outpatient, your family member will be asked to stay in their vehicle until the surgery is complete and you are ready for discharge.  If you are going to be admitted after your surgery, once you are called to come inside the building, your family will be able to leave the parking lot. At this time the staff will also ask for your designated spokesperson information in the event that the physician or staff need to provide an update or obtain any pertinent information. The designated spokesperson will be notified if the physician needs to speak to family during the pre-operative phase.and/or in the post op phase. Special Instructions: TAKE ALL MEDICATIONS DAY OF SURGERY EXCEPT:insulin,lotrel,vitamins,crestor I understand a pre-operative phone call will be made to verify my surgery time. In the event that I am not available, I give permission for a message to be left on my answering service and/or with another person? {yes @ [phone]. 
 
 
 
 ___________________      __________   _________ 
  (Signature of Patient)             (Witness)                (Date and Time)

## 2020-06-17 NOTE — PERIOP NOTES
Received +MRSA report from [address] [place] [address] 77 copy to [facility] in Infectious Disease. She states she will flag chart.

## 2020-06-22 NOTE — PROGRESS NOTES
Pt admitted to  7K15 in bed, from [facility]. Here post op from acdf surgery with Dr Paul Chandra. IV normal saline infusing into the hand right, condition patent and no redness at a rate of 100 mls/ hour with about 1,000 mls in the bag still. IV site free of s/s of infection or infiltration. INT in right hand Vital signs obtained. reveiwed admission, and med list.    Two nurse skin assessment performed by Richard Gil and Erick Wharton RN. Oriented to room. Explained patients right to have family, representative or physician notified of their admission. Patient has Declined for physician to be notified. Patient has Declined for family/representative to be notified. The patient is interested in [facility]. [facility] meds to beds program?:  Yes    Policies and procedures for 7K explained. All questions answered with no further questions at this time. Fall prevention and safety brochure discussed with patient. Bed alarm on. Call light in reach. Hand grasps pedal push and pull equal and strong. Neck collar in place, hemovac drain patent. Tong site on right temple, intact with 2 staples. See all vital documented. scds in place. Bed alarm activated and call light within reach. 1515 assessment unchanged, see mar for all meds given. He complains of surgical pain of an 8, requesting medication for pain, denies nausea. 1530 taking sips of water without difficulty. 1545 resting with eyes closed. Respirations easy and unlabored. Ирина Gordon is a 57 y.o. female here for   Chief Complaint   Patient presents with    Diabetes    Hypertension       Functional glucose monitor and record keeping system? - yes  Eye exam within last year? - June 2017  Foot exam within last year? - March 2016    1. Have you been to the ER, urgent care clinic since your last visit? Hospitalized since your last visit? -[facility] in May 2017 for kidney infection and UTI    2. Have you seen or consulted any other health care providers outside of the [address] [place] [address] since your last visit? Include any pap smears or colon screening. -PCP      Lab Results   Component Value Date/Time    Hemoglobin A1c 7.4 08/17/2017 07:23 AM    Hemoglobin A1c (POC) 7.3 11/05/2015 02:09 PM    Hemoglobin A1c, External 8.7 07/29/2016       Wt Readings from Last 3 Encounters:   03/20/17 224 lb (101.6 kg)   02/20/17 223 lb (101.2 kg)   11/18/16 219 lb (99.3 kg)     Temp Readings from Last 3 Encounters:   03/20/17 97.1 °F (36.2 °C) (Oral)   02/20/17 96.7 °F (35.9 °C) (Oral)   11/18/16 96.5 °F (35.8 °C) (Oral)     BP Readings from Last 3 Encounters:   03/20/17 184/85   02/20/17 133/75   11/18/16 136/72     Pulse Readings from Last 3 Encounters:   03/20/17 70   02/20/17 68   11/18/16 (!) 57

## 2020-06-24 NOTE — PERIOP NOTES
1524-Handoff Report from Operating Room to PACU Report received from [facility] CRNA regarding ro Chroman. Surgeon(s): 
Ramo Manzanares MD  And Procedure(s) (LRB): 
CREATION LEFT UPPER EXTREMITY GRAFT  (Left)  confirmed  
with allergies and dressings discussed. Anesthesia type, drugs, patient history, complications, estimated blood loss, vital signs, intake and output, and last pain medication, lines and temperature were reviewed. 1630- DISCHARGE SUMMARY from Nurse PATIENT INSTRUCTIONS: 
 
After general anesthesia or intravenous sedation, for 24 hours or while taking prescription Narcotics: · Limit your activities · Do not drive and operate hazardous machinery · Do not make important personal or business decisions · Do  not drink alcoholic beverages · If you have not urinated within 8 hours after discharge, please contact your surgeon on call. Report the following to your surgeon: 
· Excessive pain, swelling, redness or odor of or around the surgical area · Temperature over 100.5 · Nausea and vomiting lasting longer than 4 hours or if unable to take medications · Any signs of decreased circulation or nerve impairment to extremity: change in color, persistent  numbness, tingling, coldness or increase pain · Any questions These are general instructions for a healthy lifestyle: No smoking/ No tobacco products/ Avoid exposure to second hand smoke Surgeon General's Warning:  Quitting smoking now greatly reduces serious risk to your health. Obesity, smoking, and sedentary lifestyle greatly increases your risk for illness A healthy diet, regular physical exercise & weight monitoring are important for maintaining a healthy lifestyle You may be retaining fluid if you have a history of heart failure or if you experience any of the following symptoms:  Weight gain of 3 pounds or more overnight or 5 pounds in a week, increased swelling in our hands or feet or shortness of breath while lying flat in bed. Please call your doctor as soon as you notice any of these symptoms; do not wait until your next office visit. The discharge information has been reviewed with the patient and spouse. The patient and spouse verbalized understanding. Discharge medications reviewed with the patient and spouse and appropriate educational materials and side effects teaching were provided. ___________________________________________________________________________________________________________________________________ Discharge instructions reviewed with pt's . Paper copy signed and placed on chart.

## 2020-06-24 NOTE — ANESTHESIA PREPROCEDURE EVALUATION
Anesthetic History No history of anesthetic complications Review of Systems / Medical History Patient summary reviewed, nursing notes reviewed and pertinent labs reviewed Pulmonary Within defined limits Neuro/Psych Within defined limits Cardiovascular Hypertension Hyperlipidemia Exercise tolerance: >4 METS 
  
GI/Hepatic/Renal 
  
 
 
Renal disease: ESRD Endo/Other Diabetes Obesity Other Findings Physical Exam 
 
Airway Mallampati: II 
TM Distance: 4 - 6 cm Neck ROM: normal range of motion Mouth opening: Normal 
 
 Cardiovascular Rhythm: regular Rate: normal 
 
 
 
 Dental 
No notable dental hx Pulmonary Breath sounds clear to auscultation Abdominal 
Abdominal exam normal 
 
 
 Other Findings Anesthetic Plan ASA: 3 Anesthesia type: MAC and regional - supraclavicular block Induction: Intravenous Anesthetic plan and risks discussed with: Patient

## 2020-06-24 NOTE — PERIOP NOTES
[facility]ily[facility]pt Wound Debridement and Cleansing System  Ref: Ezio [place]: [id number] LOT: [id number] Expiration Date: 2021/05/31 Surgicel Fibrillar Absorbable Hemostat 4in x 4in Reference number: 1965 Lot Number: [id number] Expiration Date: 2022/08/31

## 2020-06-24 NOTE — ANESTHESIA POSTPROCEDURE EVALUATION
Procedure(s): CREATION LEFT UPPER EXTREMITY GRAFT . 
 
MAC, regional 
 
Anesthesia Post Evaluation Patient location during evaluation: PACU Note status: Adequate. Level of consciousness: responsive to verbal stimuli and sleepy but conscious Pain management: satisfactory to patient Airway patency: patent Anesthetic complications: no 
Cardiovascular status: acceptable Respiratory status: acceptable Hydration status: acceptable Comments: +Post-Anesthesia Evaluation and Assessment Patient: Remberto Arciniega MRN: [id number]  SSN: [id number] YOB: 1955  Age: 77 y.o. Sex: female Cardiovascular Function/Vital Signs /72   Pulse 69   Temp 36.6 °C (97.9 °F)   Resp 16   Ht 5' 3\" (1.6 m)   Wt 94.1 kg (207 lb 7.3 oz)   SpO2 97%   BMI 36.75 kg/m² Patient is status post Procedure(s): CREATION LEFT UPPER EXTREMITY GRAFT . Nausea/Vomiting: Controlled. Postoperative hydration reviewed and adequate. Pain: 
Pain Scale 1: Numeric (0 - 10) (06/24/20 1600) Pain Intensity 1: 0 (06/24/20 1600) Managed. Neurological Status:  
Neuro (WDL): Exceptions to WDL (06/24/20 1525) At baseline. Mental Status and Level of Consciousness: Arousable. Pulmonary Status:  
O2 Device: Room air (06/24/20 1600) Adequate oxygenation and airway patent. Complications related to anesthesia: None Post-anesthesia assessment completed. No concerns. Signed By: Pablo Kauffman MD  
 6/24/2020 Post anesthesia nausea and vomiting:  controlled INITIAL Post-op Vital signs:  
Vitals Value Taken Time /72 6/24/2020  4:15 PM  
Temp 36.6 °C (97.9 °F) 6/24/2020  3:25 PM  
Pulse 69 6/24/2020  4:20 PM  
Resp 20 6/24/2020  4:20 PM  
SpO2 97 % 6/24/2020  4:20 PM  
Vitals shown include unvalidated device data.

## 2020-06-24 NOTE — DISCHARGE INSTRUCTIONS
Patient Discharge Instructions    Mariela French / [id number] : 1955    Admitted 2020 Discharged: 2020     Take Home Medications        · It is important that you take the medication exactly as they are prescribed. · Keep your medication in the bottles provided by the pharmacist and keep a list of the medication names, dosages, and times to be taken in your wallet. · Do not take other medications without consulting your doctor. What to do at [address] [place] [address] [place]: You may shower, no tub baths    Recommended diet: Renal    Recommended activity: As Tolerated. No Strenuous activity or heavy lifting    If you experience any of the following symptoms: bleeding, hand numbness/weakness, fevers, chills then please call the office    Follow-up with Dr Shakir Solomon in 2 weeks [phone]        Information obtained by :  I understand that if any problems occur once I am at home I am to contact my physician. I understand and acknowledge receipt of the instructions indicated above. Physician's or R.N.'s Signature                                                                  Date/Time                                                                                                                                              Patient or Representative Signature                                                          Date/Time      Patient Education      Oxycodone/Acetaminophen (Percocet, Roxicet) - (By mouth)   Why this medicine is used:   Treats pain. This medicine contains a narcotic pain reliever.   Contact a nurse or doctor right away if you have:  · Extreme weakness, shallow breathing, slow heartbeat  · Sweating or cold, clammy skin  · Skin blisters, rash, or peeling     Common side effects:  · Constipation  · Nausea, vomiting  · Tiredness  ©  [facility] [address] [place] [address] is for End User's use only and may not be sold, redistributed or otherwise used for commercial purposes. How to Care for Your Wound After Its Treated With  DERMABOND* Topical Skin Adhesive  DERMABOND* Topical Skin Adhesive (2-octyl cyanoacrylate) is a sterile, liquid skin adhesive  that holds wound edges together. The film will usually remain in place for 5 to 10 days, then  naturally fall off your skin. The following will answer some of your questions and provide instructions for proper care for your  wound while it is healing:    CHECK WOUND APPEARANCE   Some swelling, redness, and pain are common with all wounds and normally will go away as the  wound heals. If swelling, redness, or pain increases or if the wound feels warm to the touch,  contact a doctor. Also contact a doctor if the wound edges reopen or separate. REPLACE BANDAGES   If your wound is bandaged, keep the bandage dry.  Replace the dressing daily until the adhesive film has fallen off or if the  bandage should become wet, unless otherwise instructed by your  physician.  When changing the dressing, do not place tape directly over the  DERMABOND adhesive film, because removing the tape later may also  remove the film. AVOID TOPICAL MEDICATIONS   Do not apply liquid or ointment medications or any other product to your wound while the  DERMABOND adhesive film is in place. These may loosen the film before your wound is healed. KEEP WOUND DRY AND PROTECTED   You may occasionally and briefly wet your wound in the shower or bath. Do not soak or scrub  your wound, do not swim, and avoid periods of heavy perspiration until the DERMABOND  adhesive has naturally fallen off. After showering or bathing, gently blot your wound dry with a  soft towel. If a protective dressing is being used, apply a fresh, dry bandage, being sure to keep  the tape off the DERMABOND adhesive film.    Apply a clean, dry bandage over the wound if necessary to protect it.  Protect your wound from injury until the skin has had sufficient time to heal.   Do not scratch, rub, or pick at the DERMABOND adhesive film. This may loosen the film before  your wound is healed.  Protect the wound from prolonged exposure to sunlight or tanning lamps while the film is in  place. If you have any questions or concerns about this product, please consult your doctor. *Trademark ©[employer] 2002      TO PREVENT AN INFECTION      1. [address] YOUR HANDS     To prevent infection, good handwashing is the most important thing you or your caregiver can do.  Wash your hands with soap and water or use the hand  we gave you before you touch any wounds. 2. SHOWER     Use the antibacterial soap we gave you when you take a shower.  Shower with this soap until your wounds are healed.  To reach all areas of your body, you may need someone to help you.  Dont forget to clean your belly button with every shower. 3.  USE CLEAN SHEETS     Use freshly cleaned sheets on your bed after surgery.  To keep the surgery site clean, do not allow pets to sleep with you while your wound is still healing. 4. STOP SMOKING     Stop smoking, or at least cut back on smoking     Smoking slows your healing. 5.  CONTROL YOUR BLOOD SUGAR     High blood sugars slow wound healing.  If you are diabetic, control your blood sugar levels before and after your surgery. DISCHARGE SUMMARY from Nurse    PATIENT INSTRUCTIONS:    After general anesthesia or intravenous sedation, for 24 hours or while taking prescription Narcotics:  · Limit your activities  · Do not drive and operate hazardous machinery  · Do not make important personal or business decisions  · Do  not drink alcoholic beverages  · If you have not urinated within 8 hours after discharge, please contact your surgeon on call.     Report the following to your surgeon:  · Excessive pain, swelling, redness or odor of or around the surgical area  · Temperature over 100.5  · Nausea and vomiting lasting longer than 4 hours or if unable to take medications  · Any signs of decreased circulation or nerve impairment to extremity: change in color, persistent  numbness, tingling, coldness or increase pain  · Any questions      These are general instructions for a healthy lifestyle:    No smoking/ No tobacco products/ Avoid exposure to second hand smoke  Surgeon General's Warning:  Quitting smoking now greatly reduces serious risk to your health. Obesity, smoking, and sedentary lifestyle greatly increases your risk for illness    A healthy diet, regular physical exercise & weight monitoring are important for maintaining a healthy lifestyle    You may be retaining fluid if you have a history of heart failure or if you experience any of the following symptoms:  Weight gain of 3 pounds or more overnight or 5 pounds in a week, increased swelling in our hands or feet or shortness of breath while lying flat in bed. Please call your doctor as soon as you notice any of these symptoms; do not wait until your next office visit.

## 2020-06-24 NOTE — PERIOP NOTES
L interscalene block completed by Dr Mackenzie Pro w/out complications noted. Pt drowsy but able to respond to verbal or nonverbal stimuli appropriately. VSS. Pt on 2lpm nc o2. Pt denies of any SOB or CP at this time.

## 2020-06-24 NOTE — OP NOTES
[address] 
OPERATIVE REPORT Name: Abbey Ortiz MR#: [id number] : 1955 DATE OF SERVICE: 20 PREOPERATIVE DIAGNOSIS: ESRD POSTOPERATIVE DIAGNOSIS: ESRD PROCEDURE PERFORMED: 
1. Creation of left radiocephalic fistula SURGEON:  Janneth Alfonso MD. ANESTHESIA:  General. 
  
COMPLICATIONS:  None. SPECIMENS REMOVED:  None. IMPLANTS:  None. ESTIMATED BLOOD LOSS:  minimal 
  
INDICATIONS:  The patient is a  77 who is on hemodialysis with history of failed left brachiobasilic fistula and needs placement of permanent access. Intraoperative vein mapping showed adequate left cephalic vein. Risks and benefits of procedure discussed with patient and they wished to proceed. PROCEDURE: After informed consent was obtained, the patient was taken to the operating room. The patient was placed in the supine position. The patient received intravenous sedation. The left arm was prepped and draped in the usual sterile fashion. We made a small transverse incision in the left cubital fossa. The cephalic vein was identified and mobilized. The fascia was incised, and the proximal radial artery was also identified and mobilized. The radial artery was free off significant disease. A good pulse was noted. The cephalic vein was mobilized proximally and distally. The radial artery was mobilized proximally and distally. Heparin was given. The brachial artery was then clamped proximally and distally. The cephalic vein was ligated distally and clamped proximally. Longitudinal arteriotomy was made in thel artery. We then sewed the vein to the artery in a end-to-side fashion using a running 6-0 Prolene suture. Just prior to completion of the anastomosis, it was flushed, and the anastomosis was then completed. A great thrill was noted. A great thrill remained in the fistula. Hemostasis was secured.  We then closed the wound using interrupted 3-0 vicryl sutures for the fascia and a running 4-0 Monocryl subcuticular suture for the skin. Exofin was appleid The patient tolerated the procedure well. There were no operative complications. The sponge, instrument, and needle counts were correct at the end of the case. I was present and participated in all aspects of the procedure. The patient was then transferred to the recovery room in satisfactory condition. A  thrill was felt in the fistula completion.   
  
Ray Arango MD

## 2020-06-24 NOTE — ANESTHESIA PROCEDURE NOTES
Peripheral Block Start time: 6/24/2020 12:52 PM 
Performed by: Salvador Dumont MD 
Authorized by: Salvador Dumont MD  
 
 
Pre-procedure: Indications: at surgeon's request and post-op pain management Preanesthetic Checklist: patient identified, risks and benefits discussed, site marked, timeout performed and patient being monitored Timeout Time: 12:52 Block Type:  
Block Type: Interscalene Laterality:  Left Monitoring:  Standard ASA monitoring, continuous pulse ox, frequent vital sign checks, heart rate, responsive to questions and oxygen Injection Technique:  Single shot Procedures: ultrasound guided Patient Position: supine Prep: chlorhexidine Location:  Interscalene Needle Type:  Stimuplex Needle Gauge:  21 G Needle Localization:  Ultrasound guidance and anatomical landmarks Assessment: 
Number of attempts:  1 Injection Assessment:  Incremental injection every 5 mL, local visualized surrounding nerve on ultrasound, negative aspiration for blood, no paresthesia, no intravascular symptoms and ultrasound image on chart Patient tolerance:  Patient tolerated the procedure well with no immediate complications

## 2020-06-24 NOTE — BRIEF OP NOTE
Brief Postoperative Note Patient: Germaine Monzon YOB: 1955 MRN: [id number] Date of Procedure: 6/24/2020 Pre-Op Diagnosis: END STAGE RENAL DISEASE Post-Op Diagnosis: Same as preoperative diagnosis. Procedure(s): CREATION LEFT UPPER EXTREMITY GRAFT Surgeon(s): 
Bianca Cool MD 
 
Surgical Assistant: None Anesthesia: Other Estimated Blood Loss (mL): less than 50 Complications: None Specimens: * No specimens in log * Implants: * No implants in log * Drains: * No LDAs found * Findings: proximal radiocephalic fistula. Good thrill Electronically Signed by Dennys Callaway MD on 6/24/2020 at 3:20 PM

## 2020-08-28 NOTE — PROGRESS NOTES
Attempted to call Dr Cynthia Reed office at [facility] to get office notes and info about pts stents that were placed earlier in the month. No answer or VM, will call back on Monday morning.

## 2020-08-28 NOTE — PROGRESS NOTES
Attempted to call Dr Forbes [place] office to remind them to call scheduling to change to urgent. I did get orders stating urgent.

## 2020-08-28 NOTE — PERIOP NOTES
Talked with Shelley Arrieta at Dr Brooks Husbands office about pt just getting 2 stents placed in her heart this month, making sure he is aware, nurse states they are unaware, but will notify Dr Minesh Galo. She is going to fax me orders and she said that Dr Minesh Galo didn't want pt to stop her plavix or aspirin. Also, need notes from Dr Talia Clark office.

## 2020-08-28 NOTE — PERIOP NOTES
[facility] Preoperative Instructions Surgery Date 09/2/20          Time of Arrival 4313 1. On the day of your surgery, please report to the Surgical Services Registration Desk and sign in at your designated time. The Surgery Center is located to the right of the Emergency Room. 2. You must have someone with you to drive you home. You should not drive a car for 24 hours following surgery. Please make arrangements for a friend or family member to stay with you for the first 24 hours after your surgery. 3. Do not have anything to eat or drink (including water, gum, mints, coffee, juice) after midnight ?09/01/20? [place] ? This may not apply to medications prescribed by your physician. ?(Please note below the special instructions with medications to take the morning of your procedure.) 4. We recommend you do not drink any alcoholic beverages for 24 hours before and after your surgery. 5. Contact your surgeons office for instructions on the following medications: non-steroidal anti-inflammatory drugs (i.e. Advil, Aleve), vitamins, and supplements. (Some surgeons will want you to stop these medications prior to surgery and others may allow you to take them) **If you are currently taking Plavix, Coumadin, Aspirin and/or other blood-thinning agents, contact your surgeon for instructions. ** Your surgeon will partner with the physician prescribing these medications to determine if it is safe to stop or if you need to continue taking. Please do not stop taking these medications without instructions from your surgeon 6. Wear comfortable clothes. Wear glasses instead of contacts. Do not bring any money or jewelry. Please bring picture ID, insurance card, and any prearranged co-payment or hospital payment. Do not wear make-up, particularly mascara the morning of your surgery. Do not wear nail polish, particularly if you are having foot /hand surgery.   Wear your hair loose or down, no ponytails, buns, rena pins or clips. All body piercings must be removed. Please shower with antibacterial soap for three consecutive days before and on the morning of surgery, but do not apply any lotions, powders or deodorants after the shower on the day of surgery. Please use a fresh towels after each shower. Please sleep in clean clothes and change bed linens the night before surgery. Please do not shave for 48 hours prior to surgery. Shaving of the face is acceptable. 7. You should understand that if you do not follow these instructions your surgery may be cancelled. If your physical condition changes (I.e. fever, cold or flu) please contact your surgeon as soon as possible. 8. It is important that you be on time. If a situation occurs where you may be late, please call [phone] (OR Holding Area). 9. If you have any questions and or problems, please call [phone] (Pre-admission Testing). 10. Your surgery time may be subject to change. You will receive a phone call the evening prior if your time changes. 11.  If having outpatient surgery, you must have someone to drive you here, stay with you during the duration of your stay, and to drive you home at time of discharge. Special Instructions: TAKE ALL MEDICATIONS DAY OF SURGERY EXCEPT: lotrel, plavix I understand a pre-operative phone call will be made to verify my surgery time. In the event that I am not available, I give permission for a message to be left on my answering service and/or with another person? Yes [phone] 
 
 
 
 ___________________      __________   _________ 
  (Signature of Patient)             (Witness)                (Date and Time)

## 2020-08-28 NOTE — PROGRESS NOTES
Faxed Dr Francisca Massey, cardiology at [facility], via medical records to get them to fax me last office note, info on new stents placed this month and any other pertinent testing. Paper confirmation in patients chart.

## 2020-09-01 NOTE — ADVANCED PRACTICE NURSE
Dr. Dony Rodrigues aware of recent ALEAH x 2 at [facility] and continuation of Plavix and ASA without interruption. Surgeon is ok to proceed with surgery as planned on 9/2/20.

## 2020-09-01 NOTE — PERIOP NOTES
Faxed [facility] for 3rd time in an attempt to obtain cardiac notes regarding drug eluting stents recently placed per patient. Called [facility] just to confirm she is not one of their patients. Left msg with Dr. Taya Simon' office (no name given on recording) asking that they please call me back. Left msg for patient asking her tcb with name of cardiologist. 
 
 
Called [facility] & asked that they please fax records.

## 2020-09-01 NOTE — ANESTHESIA PREPROCEDURE EVALUATION
Anesthetic History No history of anesthetic complications Review of Systems / Medical History Patient summary reviewed, nursing notes reviewed and pertinent labs reviewed Pulmonary Within defined limits Neuro/Psych Within defined limits Cardiovascular Hypertension: well controlled CAD, cardiac stents and hyperlipidemia Exercise tolerance: <4 METS 
  
GI/Hepatic/Renal 
  
 
 
Renal disease: ESRD and dialysis Comments: Hx of gastric bypass Endo/Other Diabetes: poorly controlled, type 2, using insulin Obesity and anemia Pertinent negatives: No morbid obesity Other Findings Physical Exam 
 
Airway Mallampati: III 
TM Distance: 4 - 6 cm Neck ROM: normal range of motion Mouth opening: Normal 
 
 Cardiovascular Rhythm: regular Rate: normal 
 
 
 
 Dental 
 
Dentition: Upper dentition intact and Lower dentition intact Pulmonary Decreased breath sounds: bibasilar Abdominal 
Abdominal exam normal 
 
 
 Other Findings Anesthetic Plan ASA: 4 Anesthesia type: MAC and regional - supraclavicular block Monitoring Plan: BIS Induction: Intravenous Anesthetic plan and risks discussed with: Patient Took her beta blocker this morning

## 2020-09-02 NOTE — H&P
Vascular Surgery History & Physical 
 
Subjective:  
 
Caitlin Orta is a 77 y.o.  female with ESRD that is s/p failed LUE brachiobasilic and brachiocephalic fistula. Needs dialysis access. Past Medical History:  
Diagnosis Date  CAD (coronary artery disease) stents x 2  Chronic kidney disease   
 t-th-s/us renal care in [facility] hght[facility]  Diabetes ([facility] [address] type 2  
 Hypercholesterolemia  Hypertension  Morbid obesity ([facility] [address] bmi 35  Renal insufficiency Past Surgical History:  
Procedure Laterality Date  CARDIAC SURG PROCEDURE UNLIST 2 cardiac stents  COLONOSCOPY  EGD  HX APPENDECTOMY  2019  HX GASTRIC BYPASS  2009  
 gastric band  HX ORTHOPAEDIC Bilateral   
 ctr  HX VASCULAR ACCESS Left 2020  
 lorenzo cath for dialysis  LAP, PLACE ADJUST GASTR BAND  7/29/10 [address] PROC  7/29/10  
 REPAIR EPIGASTRIC HERNIA,REDUC  7/29/10 Family History Problem Relation Age of Onset  Heart Disease Mother  Diabetes Mother  Kidney Disease Mother [facility] Other Mother  Cancer Father  Emphysema Father  Asthma Sister  Heart Disease Sister  Diabetes Child Social History Tobacco Use  Smoking status: Never Smoker  Smokeless tobacco: Never Used Substance Use Topics  Alcohol use: No  
   
Prior to Admission medications Medication Sig Start Date End Date Taking? Authorizing Provider  
aspirin delayed-release 81 mg tablet Take 81 mg by mouth daily. Yes Provider, Historical  
clopidogreL (Plavix) 75 mg tab Take 75 mg by mouth daily. Yes Provider, Historical  
atenoloL (TENORMIN) 50 mg tablet Take 1 Tab by mouth daily. Stop 100 mg dose Patient taking differently: Take 50 mg by mouth daily. Stop 100 mg dose Takes pm 6/8/20  Yes Vikki Montiel MD  
rosuvastatin (CRESTOR) 10 mg tablet TAKE 1 TABLET NIGHTLY 6/8/20  Yes Vikki Montiel MD  
 insulin glargine (Lantus Solostar U-100 Insulin) 100 unit/mL (3 mL) inpn Decrease to 24 units after 11 am daily 6/8/20  Yes Fede Neal MD  
amLODIPine-benazepril (LOTREL) 10-40 mg per capsule TAKE 1 CAPSULE DAILY 5/19/19  Yes Fede Neal MD  
Insulin Needles, Disposable, (CELSO PEN NEEDLE) 32 gauge x 5/32\" ndle Use 4 times daily. Dx code E11.65 11/5/15  Yes Fede Neal MD  
insulin glargine (LANTUS SOLOSTAR) 100 unit/mL (3 mL) pen sample 5/5/14  Yes Fede Neal MD  
insulin syringe-needle U-100 (INSULIN SYRINGE ULTRA-FINE) 1 mL 31 x 15/64\" syrg 1 Syringe by Does Not Apply route daily. Use once daily 1/24/14  Yes Fede Neal MD  
loratadine (CLARITIN) 10 mg tablet Take 10 mg by mouth daily. Yes Provider, Historical  
hydrALAZINE (APRESOLINE) 25 mg tablet Take 1 Tab by mouth three (3) times daily. 8/8/13  Yes Fede Neal MD  
MULTIVITS,CA,MINERALS/IRON/FA (ONE-A-DAY WOMENS FORMULA PO) Take  by mouth. Yes Provider, Historical  
glucose blood VI test strips (ONETOUCH ULTRA TEST) strip Test 4 times daily. DX CODE E11.65 12/4/18   Fede Neal MD  
flash glucose scanning reader (FREESTYLE BENEDICT 10 DAY READER) [facility] Use as directed 10/18/18   Fede Neal MD  
flash glucose sensor (FREESTYLE BENEDICT 10 DAY SENSOR) kit Use it every 10 days 10/18/18   Fede Neal MD  
Blood-Glucose Meter (ONETOUCH ULTRAMINI) monitoring kit Dispense 1 meter. Test 4 times daily. Dx code E11.65 6/22/18   Fede Neal MD  
NYSTOP powder CHANDRAKANT UTD BID EXTERNALLY 1/27/17   Provider, Historical  
Lancets (ONE TOUCH DELICA) misc Test 4 times daily. DX CODE 250.00 1/5/15   Fede Neal MD  
 
No Known Allergies Review of Systems: 
Review of Systems Constitutional: Negative for chills and fever. Respiratory: Negative for shortness of breath. Cardiovascular: Negative for chest pain. Gastrointestinal: Negative for nausea and vomiting. Skin: Negative for pallor and rash. Neurological: Negative for weakness and light-headedness. Psychiatric/Behavioral: Negative for confusion. Objective:  
 
Patient Vitals for the past 24 hrs: 
 BP Temp Pulse Resp SpO2 Height Weight 09/02/20 0730 137/63  66 14 99 %    
09/02/20 0727 151/66  68 9 100 %    
09/02/20 0723 152/65  66 10 100 %    
09/02/20 0712 139/64 98.6 °F (37 °C) 67 11 100 % 5' 3\" (1.6 m) 94.5 kg (208 lb 5.4 oz) Physical Exam:  
Physical Exam 
Constitutional:   
   Appearance: Normal appearance. HENT:  
   Head: Normocephalic. Nose: Nose normal.  
Eyes:  
   Extraocular Movements: Extraocular movements intact. Pupils: Pupils are equal, round, and reactive to light. Neck: Musculoskeletal: Normal range of motion. Cardiovascular:  
   Rate and Rhythm: Normal rate and regular rhythm. Abdominal:  
   Palpations: Abdomen is soft. Musculoskeletal: Normal range of motion. Skin: 
   General: Skin is warm. Capillary Refill: Capillary refill takes less than 2 seconds. Neurological:  
   General: No focal deficit present. Mental Status: She is alert and oriented to person, place, and time. Psychiatric:     
   Mood and Affect: Mood normal.  
 
 
 
Data Review:  
Recent Results (from the past 24 hour(s)) POC CHEM8 Collection Time: 09/02/20  7:06 AM  
Result Value Ref Range Calcium, ionized (POC) 1.22 1.12 - 1.32 mmol/L Sodium (POC) 141 136 - 145 mmol/L Potassium (POC) 4.2 3.5 - 5.1 mmol/L Chloride (POC) 101 98 - 107 mmol/L  
 CO2 (POC) 22 21 - 32 mmol/L Anion gap (POC) 24 (H) 10 - 20 mmol/L Glucose (POC) 121 (H) 65 - 100 mg/dL BUN (POC) 34 (H) 9 - 20 mg/dL Creatinine (POC) 4.6 (H) 0.6 - 1.3 mg/dL GFRAA, POC 12 (L) >60 ml/min/1.73m2 GFRNA, POC 10 (L) >60 ml/min/1.73m2 Hematocrit (POC) 32 (L) 35.0 - 47.0 % Comment Comment Not Indicated. Assessment/Plan: OR for LUE graft Signed By: Hollie Joseph MD   
 September 2, 2020

## 2020-09-02 NOTE — ANESTHESIA POSTPROCEDURE EVALUATION
Procedure(s): 
Left subclavian stenting, angioplasty, and CREATION of LEFT ARM AV GRAFT (AXILLARY). MAC, regional 
 
Anesthesia Post Evaluation Patient location during evaluation: PACU Note status: Adequate. Level of consciousness: responsive to verbal stimuli and sleepy but conscious Pain management: satisfactory to patient Airway patency: patent Anesthetic complications: no 
Cardiovascular status: acceptable Respiratory status: acceptable Hydration status: acceptable Comments: +Post-Anesthesia Evaluation and Assessment Patient: Diane Bales MRN: [id number]  SSN: [id number] YOB: 1955  Age: 77 y.o. Sex: female Cardiovascular Function/Vital Signs /53 (BP 1 Location: Right arm, BP Patient Position: At rest)   Pulse (!) 59   Temp 36.4 °C (97.6 °F)   Resp 13   Ht 5' 3\" (1.6 m)   Wt 94.5 kg (208 lb 5.4 oz)   SpO2 100%   BMI 36.90 kg/m² Patient is status post Procedure(s): 
Left subclavian stenting, angioplasty, and CREATION of LEFT ARM AV GRAFT (AXILLARY). Nausea/Vomiting: Controlled. Postoperative hydration reviewed and adequate. Pain: 
Pain Scale 1: Numeric (0 - 10) (09/02/20 1300) Pain Intensity 1: 4 (09/02/20 1300) Managed. Neurological Status:  
Neuro (WDL): Exceptions to WDL (09/02/20 1200) At baseline. Mental Status and Level of Consciousness: Arousable. Pulmonary Status:  
O2 Device: Room air (09/02/20 1300) Adequate oxygenation and airway patent. Complications related to anesthesia: None Post-anesthesia assessment completed. No concerns. Signed By: Ghassan Stahl MD  
 9/2/2020 Post anesthesia nausea and vomiting:  controlled INITIAL Post-op Vital signs:  
Vitals Value Taken Time /53 9/2/2020  1:00 PM  
Temp 36.4 °C (97.6 °F) 9/2/2020 12:00 PM  
Pulse 59 9/2/2020  1:11 PM  
Resp 12 9/2/2020  1:11 PM  
SpO2 100 % 9/2/2020  1:11 PM  
Vitals shown include unvalidated device data.

## 2020-09-02 NOTE — PERIOP NOTES
Handoff Report from Operating Room to PACU Report received from [facility] and OR RN regarding [place] [facility]. Surgeon(s): 
Ramo Manzanares MD  And Procedure(s) (LRB): 
Left subclavian stenting, angioplasty, and CREATION of LEFT ARM AV GRAFT (AXILLARY) (Left)  confirmed  
with allergies and dressings discussed. Anesthesia type, drugs, patient history, complications, estimated blood loss, vital signs, intake and output, and last pain medication, lines and temperature were reviewed. 1247: Updated patients  Naomi Barrera 1305: Report given to Yimi Pina RN in [place]

## 2020-09-02 NOTE — ANESTHESIA PROCEDURE NOTES
Peripheral Block Start time: 9/2/2020 7:21 AM 
End time: 9/2/2020 7:34 AM 
Performed by: Sherif Beltrán MD 
Authorized by: Sherif Beltrán MD  
 
 
Pre-procedure: Indications: at surgeon's request, post-op pain management, procedure for pain and primary anesthetic Preanesthetic Checklist: patient identified, risks and benefits discussed, site marked, timeout performed and patient being monitored Timeout Time: 07:21 Block Type:  
Block Type:  Supraclavicular Laterality:  Left Monitoring:  Standard ASA monitoring, continuous pulse ox, frequent vital sign checks, heart rate, responsive to questions and oxygen Injection Technique:  Single shot Procedures: ultrasound guided Patient Position: supine Prep: DuraPrep Location:  Supraclavicular Needle Type:  Stimuplex Needle Gauge:  20 G Needle Localization:  Anatomical landmarks and ultrasound guidance Assessment: 
Number of attempts:  1 Injection Assessment:  Negative aspiration for blood, incremental injection every 5 mL, negative aspiration for CSF, no paresthesia, ultrasound image on chart, no intravascular symptoms and local visualized surrounding nerve on ultrasound Patient tolerance:  Patient tolerated the procedure well with no immediate complications Patient arrived late.

## 2020-09-02 NOTE — DISCHARGE INSTRUCTIONS
Patient Discharge Instructions    Vikram Rosharon / [id number] : 1955    Admitted 2020 Discharged: 2020     Take Home Medications     · It is important that you take the medication exactly as they are prescribed. · Keep your medication in the bottles provided by the pharmacist and keep a list of the medication names, dosages, and times to be taken in your wallet. · Do not take other medications without consulting your doctor. What to do at [address] [place] [address] [place]: You may shower, no tub baths    Recommended diet: Renal    Recommended activity: As Tolerated. No Strenuous activity or heavy lifting    If you experience any of the following symptoms: bleeding, hand numbness/weakness, fevers, chills then please call the office    Follow-up with Dr Margaret Allen in 2 weeks [phone]        Information obtained by :  I understand that if any problems occur once I am at home I am to contact my physician. I understand and acknowledge receipt of the instructions indicated above. Physician's or R.N.'s Signature                                                                  Date/Time                                                                                                                                              Patient or Representative Signature                                                          Date/Time      DISCHARGE SUMMARY from Nurse    PATIENT INSTRUCTIONS:    After general anesthesia or intravenous sedation, for 24 hours or while taking prescription Narcotics:  · Limit your activities  · Do not drive and operate hazardous machinery  · Do not make important personal or business decisions  · Do  not drink alcoholic beverages  · If you have not urinated within 8 hours after discharge, please contact your surgeon on call.     Report the following to your surgeon:  · Excessive pain, swelling, redness or odor of or around the surgical area  · Temperature over 100.5  · Nausea and vomiting lasting longer than 4 hours or if unable to take medications  · Any signs of decreased circulation or nerve impairment to extremity: change in color, persistent  numbness, tingling, coldness or increase pain  · Any questions    What to do at Home:    *  Please give a list of your current medications to your Primary Care Provider. *  Please update this list whenever your medications are discontinued, doses are      changed, or new medications (including over-the-counter products) are added. *  Please carry medication information at all times in case of emergency situations. These are general instructions for a healthy lifestyle:    No smoking/ No tobacco products/ Avoid exposure to second hand smoke  Surgeon General's Warning:  Quitting smoking now greatly reduces serious risk to your health. Obesity, smoking, and sedentary lifestyle greatly increases your risk for illness    A healthy diet, regular physical exercise & weight monitoring are important for maintaining a healthy lifestyle    You may be retaining fluid if you have a history of heart failure or if you experience any of the following symptoms:  Weight gain of 3 pounds or more overnight or 5 pounds in a week, increased swelling in our hands or feet or shortness of breath while lying flat in bed. Please call your doctor as soon as you notice any of these symptoms; do not wait until your next office visit. The discharge information has been reviewed with the patient and caregiver. The patient and caregiver verbalized understanding. Discharge medications reviewed with the patient and caregiver and appropriate educational materials and side effects teaching were provided.   ___________________________________________________________________________________________________________________________________    A common side effect of anesthesia following surgery is nausea and/or vomiting. In order to decrease symptoms, it is wise to avoid foods that are high in fat, greasy foods, milk products, and spicy foods for the first 24 hours. Acceptable foods for the first 24 hours following surgery include but are not limited to:     soup   broth    toast    crackers    applesauce    bananas    mashed potatoes,   soft or scrambled eggs   oatmeal    jello    It is important to eat when taking your pain medication. This will help to prevent nausea. If possible, please try to time your meals with your medications. It is very important to stay hydrated following surgery. Sip fluids frequently while awake. Avoid acidic drinks such as citrus juices and soda for 24 hours. Carbonated beverages may cause bloating and gas. Acceptable fluids include:    - water (flavor packets may add variety)  - coffee or tea (in moderation)  - Gatorade  - Rakesh-aid  - apple juice  - cranberry juice    You are encouraged to cough and deep breathe every hour when awake. This will help to prevent respiratory complications following anesthesia. You may want to hug a pillow when coughing and sneezing to add additional support to the surgical area and to decrease discomfort if you had abdominal or chest surgery. If you are discharged home with support stockings, you may remove them after 24 hours. Support stockings are used to help prevent blood clots in the legs following surgery. Please take time to review all of your Home Care Instructions and Medication Information sheets provided in your discharge packet. If you have any questions, please contact your surgeons office. Thank you. TO PREVENT AN INFECTION      1. [address] YOUR HANDS     To prevent infection, good handwashing is the most important thing you or your caregiver can do.        Wash your hands with soap and water or use the hand  we gave you before you touch any wounds. 2. SHOWER     Use the antibacterial soap we gave you when you take a shower.  Shower with this soap until your wounds are healed.  To reach all areas of your body, you may need someone to help you.  Dont forget to clean your belly button with every shower. 3.  USE CLEAN SHEETS     Use freshly cleaned sheets on your bed after surgery.  To keep the surgery site clean, do not allow pets to sleep with you while your wound is still healing. 4. STOP SMOKING     Stop smoking, or at least cut back on smoking     Smoking slows your healing. 5.  CONTROL YOUR BLOOD SUGAR     High blood sugars slow wound healing.  If you are diabetic, control your blood sugar levels before and after your surgery. DISCHARGE SUMMARY from Nurse    PATIENT INSTRUCTIONS:    After general anesthesia or intravenous sedation, for 24 hours or while taking prescription Narcotics:  · Limit your activities  · Do not drive and operate hazardous machinery  · Do not make important personal or business decisions  · Do  not drink alcoholic beverages  · If you have not urinated within 8 hours after discharge, please contact your surgeon on call. Report the following to your surgeon:  · Excessive pain, swelling, redness or odor of or around the surgical area  · Temperature over 100.5  · Nausea and vomiting lasting longer than 4 hours or if unable to take medications  · Any signs of decreased circulation or nerve impairment to extremity: change in color, persistent  numbness, tingling, coldness or increase pain  · Any questions    These are general instructions for a healthy lifestyle:    No smoking/ No tobacco products/ Avoid exposure to second hand smoke  Surgeon General's Warning:  Quitting smoking now greatly reduces serious risk to your health.     Obesity, smoking, and sedentary lifestyle greatly increases your risk for illness    A healthy diet, regular physical exercise & weight monitoring are important for maintaining a healthy lifestyle    You may be retaining fluid if you have a history of heart failure or if you experience any of the following symptoms:  Weight gain of 3 pounds or more overnight or 5 pounds in a week, increased swelling in our hands or feet or shortness of breath while lying flat in bed. Please call your doctor as soon as you notice any of these symptoms; do not wait until your next office visit. The discharge information has been reviewed with the patient and spouse. The patient and spouse verbalized understanding. Discharge medications reviewed with the patient and spouse and appropriate educational materials and side effects teaching were provided. ___________________________________________________________________________________________________________________________________      Patient Education   Narcotic-Analgesic/Acetaminophen (By mouth)   Relieves pain. Brand Name(s): Capital w/Codeine, [place], Echt, [place], Lorcet HD, Lorcet Plus, Lortab 10/325, Lortab 5/325, Lortab 7.5/325, Lortab Elixir, [place], [place], Diez, Trezix, Tylenol With Codeine No. 4   There may be other brand names for this medicine. When This Medicine Should Not Be Used: You should not use this medicine if you have had an allergic reaction to acetaminophen, codeine, hydrocodone, propoxyphene, or sulfites. You should not use this medicine if you have had an allergic reaction to any other opioid pain medicine. How to Use This Medicine:   Liquid, Tablet, Capsule  · Your doctor will tell you how much medicine to use. Do not use more than directed. · This medicine contains acetaminophen. Read the labels of all other medicines you are using to see if they also contain acetaminophen, or ask your doctor or pharmacist. Vikram Lau not use more than 4 grams (4,000 milligrams) total of acetaminophen in one day. · Drink plenty of liquids to help avoid constipation.   If a dose is missed:   · Some of these medicines need to be used on a fixed schedule. If you miss a dose or forget to use your medicine, call your doctor pharmacist for instructions. Do not use extra medicine to make up for a missed dose. How to Store and Dispose of This Medicine:   · Store the medicine in a closed container at room temperature, away from heat, moisture, and direct light. Do not refrigerate or freeze the medicine. · Ask your pharmacist, doctor, or health caregiver about the best way to dispose of any outdated medicine or medicine no longer needed. · Keep all medicine out of the reach of children. Never share your medicine with anyone. Drugs and Foods to Avoid:   Ask your doctor or pharmacist before using any other medicine, including over-the-counter medicines, vitamins, and herbal products. · Make sure your doctor knows if you are using a monoamine oxidase inhibitor (MAOI) medicine, such as Eldepryl®, Marplan®, [place], or Parnate®. Make sure your doctor knows if you are also using a medicine to treat depression, such as amitriptyline, doxepin, nortriptyline, Elavil®, Pamelor®, or Sinequan®. Make sure your doctor knows if you are taking an anticholinergic medicine, such as atropine, methscopolamine, or scopolamine. · Tell your doctor if you use anything else that makes you sleepy. Some examples are allergy medicine, narcotic pain medicine, and alcohol. · Do not drink alcohol while you are using this medicine. Warnings While Using This Medicine:   · Make sure your doctor knows if you are pregnant or breast feeding, or if you have a head injury, or other conditions that may cause an increase in intercranial pressure (pressure inside your head). Make sure your doctor knows if you have severe kidney problems or severe liver problems, or if you have hypothyroidism (lack of thyroid function). Make sure your doctor knows if you have Perez's disease (adrenal gland disease), or if you have enlarged prostate or urethral stricture. Make sure your doctor knows if you have any abdominal problems, or if you have lung disease or asthma. · This medicine may make you dizzy or drowsy. Avoid driving, using machines, or anything else that could be dangerous if you are not alert. · This medicine can be habit-forming. Do not use more than your prescribed dose. Call your doctor if you think your medicine is not working. · Tell any doctor or dentist who treats you that you are using this medicine. This medicine may affect certain medical test results. · This medicine may cause constipation, especially with long-term use. Ask your doctor if you should use a laxative to prevent and treat constipation. · When a mother is breastfeeding and takes codeine, there is a very small chance that this medicine could cause serious side effects in the baby. This is because codeine works differently in a few women, so their breast milk contains too much medicine. If you take codeine, be alert for these signs of overdose in your nursing baby: sleeping more than usual, trouble breastfeeding, trouble breathing, or being limp and weak. Call the baby's doctor right away if you think there is a problem. If you cannot talk to the doctor, take the baby to the emergency room or call 911. Possible Side Effects While Using This Medicine:   Call your doctor right away if you notice any of these side effects:  · Allergic reaction: Itching or hives, swelling in your face or hands, swelling or tingling in your mouth or throat, chest tightness, trouble breathing  · Dizziness. · Seeing or hearing things that are not there. · Very slow heartbeat. If you notice these less serious side effects, talk with your doctor:   · Change in how much or how often you urinate. · Cold, clammy skin. · Feeling unusually anxious, excited, fearful, or tired. · Nausea, vomiting, constipation, stomach pain or upset, or heartburn. · Skin rash. · Vision changes.   If you notice other side effects that you think are caused by this medicine, tell your doctor. Call your doctor for medical advice about side effects. You may report side effects to FDA at 3-489-XAY-0988  © 2017 [facility] Information is for End User's use only and may not be sold, redistributed or otherwise used for commercial purposes. The above information is an  only. It is not intended as medical advice for individual conditions or treatments. Talk to your doctor, nurse or pharmacist before following any medical regimen to see if it is safe and effective for you.

## 2020-09-02 NOTE — BRIEF OP NOTE
Brief Postoperative Note Patient: Fiona Howard YOB: 1955 MRN: [id number] Date of Procedure: 9/2/2020 Pre-Op Diagnosis: END STAGE RENAL Post-Op Diagnosis: Same as preoperative diagnosis. Procedure(s): 
Left subclavian stenting, angioplasty, and CREATION of LEFT ARM AV GRAFT (AXILLARY) Surgeon(s): 
Lalo Raymundo MD 
 
Surgical Assistant: None Anesthesia: Other Estimated Blood Loss (mL): 200 Complications: None Specimens: * No specimens in log * Implants:  
Implant Name Type Inv. Item Serial No.  Lot No. LRB No. Used Action GRAFT TW STRTCH [id number] -- Sharla Liseth  GRAFT TW STRTCH [id number] -- [employer] [id number]  [employer] N/A Left 1 Implanted STENT BILI LD PMTD 6FR [id number] -- EXPRESS [id number] - SN/A Stent STENT BILI LD PMTD 6FR 7X27MM -- EXPRESS [id number] N/A [employer] [id number] Left 1 Implanted Drains: * No LDAs found * Findings: Greater than 30 difference in SBP in left arm. Left subclavian occlusion successfully recanalized/stented. Vert preserved. Axillary loop graft (arterial side medial). [id number] Electronically Signed by Uri Zamarripa MD on 9/2/2020 at 11:47 AM

## 2020-09-02 NOTE — PERIOP NOTES
For dc home. Vswnl. Denies pain, nausea. dsgs to L arm d&i.  + radial pulse, +thrill and bruit. Went over [employer] instructions w/pt and  including Rx and f/up. Verbalized understanding. dc'd home.

## 2020-09-02 NOTE — PERIOP NOTES
06: 11= pt has not arrived for surgery; called pt; has been \"circling around lost\"; over near [place]; assisting pt with directions to [address] Informed Ashleigh [place] Charge. 06:36= pt arrived to register. 06:57= has not been tested for COVID19; states she has not been exposed and has not had any s/s virus. Surgeon wishes to proceed. Warming blanket applied. 07:07= MRSA cx retrieved from nares and walked to lab. 
 
07:21= timeout performed with Dr Quinn Allen; 2LO2NC placed on pt and frequent VS started.

## 2020-09-03 NOTE — OP NOTES
[address] 
OPERATIVE REPORT Name:  Macarena Barbour 
MR#:  [id number] :  1955 ACCOUNT #:  [de-identified] DATE OF SERVICE:  2020 PREOPERATIVE DIAGNOSIS:  End-stage renal disease. POSTOPERATIVE DIAGNOSES: 
1.  End-stage renal disease. 2.  Left subclavian artery occlusion. PROCEDURE PERFORMED: 
1. Creation of left upper extremity axillary loop graft (arterial limb medial). 2.  Left subclavian artery recanalization and stenting with 7 x 27 Express stent. SURGEON:  Tiesha Heredia MD 
 
ASSISTANTMumarybeth Finnegan, Surgical Ass COMPLICATIONS:  None. SPECIMENS REMOVED:  None. IMPLANTS:  [place] 6-mm PTFE 7 x 27 Express stent. ESTIMATED BLOOD LOSS:  200 mL. INDICATIONS FOR PROCEDURE:  The patient is a 20-year-old female with history of failed left upper extremity fistula who presents today for creation of permanent access. The risks and benefits of surgery were discussed with the patient and she wished to proceed. DESCRIPTION OF PROCEDURE IN DETAIL:  The patient was brought into the operating room. An incision was made in her upper arm and deepened down the subcutaneous tissue with Bovie electrocautery. This area was covered as she had had a previous basilic vein transposition here. The artery was dissected free, but there was noted to be a weak pulse. I, therefore, introduced an arterial line which showed a greater than 30 difference in her systolic blood pressure and greater than 10 difference in her MAP suggesting a left subclavian artery stenosis or occlusion. At this point, I discussed with the  regarding recanalization and stenting of her left subclavian as otherwise her blood flow would not support a graft and would lead to high chance for thrombosis or steal on her left hand. He understood the risks and benefits of the procedure and wished to proceed.   A micropuncture wire was then used to place the micropuncture sheath in the axillary artery, and this was upsized to a 6 long sheath. Using a TrailBlazer catheter and stiff angled Glidewire, I was able to pass the sheath into the middle subclavian artery. Angiogram at this point showed a widely patent large vertebral artery as well as patent left internal mammary artery with an occlusion of the subclavian artery at the ostium. I was able to pass the lesion using a stiff-angled Glidewire and TrailBlazer catheter with significant difficulty given the tortuosity of the subclavian artery. Contrast injection through the TrailBlazer confirmed true lumen placement in the aorta and I was able to pass the sheath into the aorta. A 7 x 27 Express stent was then brought through the sheath and the sheath was withdrawn. Multiple views and multiple pictures were taken to identify the location of the vertebral artery and the stent was landed just proximal to this. Repeat angiogram showed brisk flow through the subclavian artery and a repeat transection of the pressure from the sheath showed equal systolic blood pressures between the two arteries. Prior to this, heparin was administered and was redosed appropriately throughout the case. After this, the artery was clamped, the sheath was removed, there was good pulsatile flow, and the 6-mm graft configuration with the arterial side medial.  In a similar fashion, the lateral side of the vein graft was sewn on to the vein with a 6-0 Prolene suture. All clamps were released. Protamine was administered. There was good hemostasis. The counter incisions were closed with 3-0 Vicryl followed by skin staples and the axillary incision was closed with 3-0 Vicryl and skin staples. There was a palpable brachial pulse and good radial signal at the completion of the case. All needle and sponge counts were correct at the end of the case. The patient tolerated the procedure well and was taken to PACU in stable condition.  
 
 
 
James Reece MD 
 
 
 [id number] 
D:  09/02/2020 11:57 
T:  09/02/2020 22:04 JOB #:  [id number]

## 2020-10-06 NOTE — H&P
Vascular Surgery History & Physical 
 
Subjective:  
 
Caitlin Howard is a 77 y.o.  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 Past Medical History:  
Diagnosis Date  Chronic kidney disease   
 t-th-s/ renal care in [facility] hghts  Diabetes ([facility] [address]  Hypercholesterolemia  Hypertension  Morbid obesity ([facility] [address] bmi 35  Renal insufficiency Past Surgical History:  
Procedure Laterality Date  COLONOSCOPY  EGD  HX APPENDECTOMY  2019  HX GASTRIC BYPASS  2009  
 gastric band  HX ORTHOPAEDIC Bilateral   
 ctr  HX VASCULAR ACCESS Left 2020  
 lorenzo cath for dialysis  LAP, PLACE ADJUST GASTR BAND  7/29/10 [address] PROC  7/29/10  
 REPAIR EPIGASTRIC HERNIA,REDUC  7/29/10 Family History Problem Relation Age of Onset  Heart Disease Mother  Diabetes Mother  Kidney Disease Mother [facility] Other Mother  Cancer Father  Emphysema Father  Asthma Sister  Heart Disease Sister  Diabetes Child Social History Tobacco Use  Smoking status: Never Smoker  Smokeless tobacco: Never Used Substance Use Topics  Alcohol use: No  
   
Prior to Admission medications Medication Sig Start Date End Date Taking? Authorizing Provider  
cholecalciferol, vitamin D3, (Vitamin D3) 50 mcg (2,000 unit) tab Take  by mouth. Yes Provider, Historical  
atenoloL (TENORMIN) 50 mg tablet Take 1 Tab by mouth daily. Stop 100 mg dose Patient taking differently: Take 50 mg by mouth daily. Stop 100 mg dose Takes pm 6/8/20  Yes Alvarez Sabillon MD  
rosuvastatin (CRESTOR) 10 mg tablet TAKE 1 TABLET NIGHTLY 6/8/20  Yes Alvarez Sabillon MD  
insulin glargine (Lantus Solostar U-100 Insulin) 100 unit/mL (3 mL) inpn Decrease to 24 units after 11 am daily 6/8/20  Yes Alvarez Sabillon MD  
 amLODIPine-benazepril (LOTREL) 10-40 mg per capsule TAKE 1 CAPSULE DAILY 5/19/19  Yes Stepan Marie MD  
loratadine (CLARITIN) 10 mg tablet Take 10 mg by mouth daily. Yes Provider, Historical  
hydrALAZINE (APRESOLINE) 25 mg tablet Take 1 Tab by mouth three (3) times daily. 8/8/13  Yes Stepan Marie MD  
MULTIVITS,CA,MINERALS/IRON/FA (ONE-A-DAY WOMENS FORMULA PO) Take  by mouth. Yes Provider, Historical  
glucose blood VI test strips (ONETOUCH ULTRA TEST) strip Test 4 times daily. DX CODE E11.65 12/4/18   Stepan Marie MD  
flash glucose scanning reader (FREESTYLE BENEDICT 10 DAY READER) misc Use as directed 10/18/18   Stepan Marie MD  
flash glucose sensor (FREESTYLE BENEDICT 10 DAY SENSOR) kit Use it every 10 days 10/18/18   Stepan Marie MD  
sodium bicarbonate 650 mg tablet TK 1 T PO BID 6/21/18   Provider, Historical  
Blood-Glucose Meter (ONETOUCH ULTRAMINI) monitoring kit Dispense 1 meter. Test 4 times daily. Dx code E11.65 6/22/18   Stepan Marie MD  
NYSTOP powder CHANDRAKANT UTD BID EXTERNALLY 1/27/17   Provider, Historical  
Insulin Needles, Disposable, (CELSO PEN NEEDLE) 32 gauge x 5/32\" ndle Use 4 times daily. Dx code E11.65 11/5/15   Stepan Marie MD  
Lancets ([facility]) misc Test 4 times daily. DX CODE 250.00 1/5/15   Stepan Marie MD  
insulin glargine (LANTUS SOLOSTAR) 100 unit/mL (3 mL) pen sample 5/5/14   Stepan Marie MD  
insulin syringe-needle U-100 (INSULIN SYRINGE ULTRA-FINE) 1 mL 31 x 15/64\" syrg 1 Syringe by Does Not Apply route daily. Use once daily 1/24/14   Stepan Marie MD  
 
No Known Allergies Review of Systems: 
Review of Systems All other systems reviewed and are negative. Objective:  
 
Patient Vitals for the past 24 hrs: 
 BP Temp Pulse Resp SpO2 Height Weight  
06/24/20 1248 184/78  69 22 95 %    
06/24/20 1206 162/63 98.1 °F (36.7 °C) 61 18 100 % 5' 3\" (1.6 m) 94.1 kg (207 lb 7.3 oz) Physical Exam:  
Physical Exam 
Constitutional: Appearance: Normal appearance. HENT:  
   Head: Normocephalic. Nose: Nose normal.  
   Mouth/Throat:  
   Mouth: Mucous membranes are dry. Eyes:  
   Extraocular Movements: Extraocular movements intact. Neck: Musculoskeletal: Normal range of motion. Cardiovascular:  
   Rate and Rhythm: Normal rate. Pulmonary:  
   Effort: Pulmonary effort is normal.  
Abdominal:  
   Palpations: Abdomen is soft. Skin: 
   Capillary Refill: Capillary refill takes less than 2 seconds. Neurological:  
   General: No focal deficit present. Mental Status: She is alert. Data Review:  
Recent Results (from the past 24 hour(s)) POC CHEM8 Collection Time: 06/24/20 12:01 PM  
Result Value Ref Range Calcium, ionized (POC) 1.19 1.12 - 1.32 mmol/L Sodium (POC) 137 136 - 145 mmol/L Potassium (POC) 4.2 3.5 - 5.1 mmol/L Chloride (POC) 103 98 - 107 mmol/L  
 CO2 (POC) 22 21 - 32 mmol/L Anion gap (POC) 17 10 - 20 mmol/L Glucose (POC) 191 (H) 65 - 100 mg/dL BUN (POC) 41 (H) 9 - 20 mg/dL Creatinine (POC) 5.1 (H) 0.6 - 1.3 mg/dL GFRAA, POC 10 (L) >60 ml/min/1.73m2 GFRNA, POC 8 (L) >60 ml/min/1.73m2 Hematocrit (POC) 32 (L) 35.0 - 47.0 % Comment Comment Not Indicated. Assessment/Plan: OR for PATRICK CELESTE Signed By: Shawnee Quiroz MD   
 June 24, 2020 none

## 2020-10-09 NOTE — PROGRESS NOTES
**THIS IS A VIRTUAL VISIT VIA AUDIO- VIDEO SYNCHRONOUS DISCUSSION. PATIENT AGREED TO HAVE THEIR CARE DELIVERED OVER A [employer] ME VIDEO VISIT IN PLACE OF THEIR REGULARLY SCHEDULED OFFICE VISIT** Pt  is aware that this is a billable encounter and is responsible for copays/deductibles Patient gave a verbal consent to proceed with virtual video visit Patient is at home and I, the provider,  am at the office care diabetes and endocrinology HISTORY OF PRESENT ILLNESS Corbin Kaba is a 75  y.o. female. HPI  
F/u for DM 2 after feb 2019 In the interim,  Started on HD  In  April 2019 Struggling with access issues for HD Reporting  Low BP - problems as well Old hsitory She looks a bit happier She improved compliance  On  meds She got the log She has lesser  sugars on log Old history :  
 
Her  got diagnosed with stage 4 lung cancer Weight loss of 4   lbs Her  is hospitalized twice in between and she is busy with him Not taking Tolerating tanzeum for cost reasons She has  been taking the basal bolus regimen She brought  the log Has low sugars in AM  
 
 
 
 
 
 
 
 
Review of Systems Constitutional: Negative. HENT: Negative. Eyes: Negative for pain and redness. Respiratory: Negative. Cardiovascular: Negative for chest pain, palpitations and leg swelling. Gastrointestinal: Negative. Negative for constipation. Genitourinary: Negative. Musculoskeletal: Negative for myalgias. Skin: Negative. Neurological: Negative. Endo/Heme/Allergies: Negative. Psychiatric/Behavioral: Negative for depression and memory loss. The patient does not have insomnia. Physical Exam  
Constitutional: oriented to person, place, and time. appears well-developed and well-nourished. HENT:  
Head: Normocephalic. Eyes: normal , noted no swelling or redness. Neck: Normal range of motion. Cardiovascular: could nto be examined, left hand fistula Pulmonary/Chest: appears breathing effortlessly Abdominal: Soft. Musculoskeletal: appears relatively nprmal  range of motion. Neurological: He is alert and oriented to person, place, and time. Appears to have no focal deficits Psychiatric: He has a normal mood and affect. Lab Results Component Value Date/Time Hemoglobin A1c 6.6 (H) 10/02/2020 07:16 AM  
 Hemoglobin A1c 7.1 (H) 06/03/2020 07:19 AM  
 Hemoglobin A1c 8.3 (H) 02/02/2019 07:55 AM  
 Microalb/Creat ratio (ug/mg creat.) 582 (H) 10/02/2020 07:16 AM  
 LDL, calculated 91 06/03/2020 07:19 AM  
 LDL Chol Calc ([facility]) 53 10/02/2020 07:16 AM  
 Creatinine 4.29 (H) 10/02/2020 07:16 AM  
 Hemoglobin A1c, External 8.7 07/29/2016 Lab Results Component Value Date/Time Cholesterol, total 130 10/02/2020 07:16 AM  
 HDL Cholesterol 56 10/02/2020 07:16 AM  
 LDL, calculated 91 06/03/2020 07:19 AM  
 LDL Chol Calc ([facility]) 53 10/02/2020 07:16 AM  
 Triglyceride 115 10/02/2020 07:16 AM  
 
Lab Results Component Value Date/Time Alk. phosphatase 139 (H) 10/02/2020 07:16 AM  
 
Lab Results Component Value Date/Time GFR est AA 12 (L) 10/02/2020 07:16 AM  
 GFR est non-AA 10 (L) 10/02/2020 07:16 AM  
 Creatinine 4.29 (H) 10/02/2020 07:16 AM  
 BUN 24 10/02/2020 07:16 AM  
 BUN (POC) 34 (H) 09/02/2020 07:06 AM  
 Sodium 140 10/02/2020 07:16 AM  
 Potassium 4.5 10/02/2020 07:16 AM  
 Chloride 100 10/02/2020 07:16 AM  
 CO2 25 10/02/2020 07:16 AM  
  
 
 
ASSESSMENT and PLAN 1. DM 2 uncontrolled-:    A1C is  6.6 %   From oct 2020     Compared to    7.1 %     From   June 2020    compared to   8.3 %  From   Feb 2019   Compared to   7.9  %   From  Oct 2018    Compared   To   7.4 %      From  June 2018      Compared      To    7.4 %         From march 2018     Compared to  7.5 %   From nov 22 2017    compared to   7.4 %   From     Aug 2017     Compared to  8.8 %   From feb 2017 Oct 2020 She has had few low sugars Decrease lantus  To 14 units  At 11 pm after dialysis Likely, she will be ready to go off insulin June 2020  : She is doing well since dialysis began Lost 20 lbs ( fluid weight  Loss ) Reviewed her log, much better ,   Occasional low sugars Recommending  To stop humalog Decrease lantus Feb 2020  :  
 
Losing control on   glycemic control Does not like to check often Non compliant with diet and insulins S/p gastric banding Off tradjenta 5 mg a day for cost   
Continue  on basal bolus regimen Encouraging patient to check more often She is even suggested to go for freestyle [place] 2. HTN :   Has drops of  BP   :  on lotrel , Hydralazine , and Decreased Atenolol to 50 mg a day . 3. ESRD -   On HD She has fistula  On left upper extremity , not helpful,  
 
 
4. Hyperlipidemia : improve compliance on crestor 5. CAD - s/p   Stent placements Pursuant  To the Emergency declaration under the [address] and the [facility], [address] waiver authority and the [facility], to reduce the patient's risk of exposure to  COVID-19 and provide continuity of care for an established patient.

## 2020-10-09 NOTE — PATIENT INSTRUCTIONS
Do not skip meals Do not eat in between meals Reduce carbs- pasta, rice, potatoes, bread Do not drink juices or sodas Donot eat peanut butter Do not eat sugar free cookies and cakes Do not eat peaches, grapes, mangoes, pine apples, oranges,  Tangerines  
 
 
----------------------------------------------------------------------------------------- Refill crestor Check blood sugars immediately before each meal and at bedtime 
 
 decrease  lantus  14   units  After  11  AM ,

## 2020-10-09 NOTE — PROGRESS NOTES
1. Have you been to the ER, urgent care clinic since your last visit? no Hospitalized since your last visit? Yes When: September, [id number] stents 2. Have you seen or consulted any other health care providers outside of the [address] [place] [address] since your last visit? Include any pap smears or colon screening.  Yes When: August, 2020, nephrologist 
 
NO BLOOD SUGAR READINGS

## 2021-01-01 ENCOUNTER — APPOINTMENT (OUTPATIENT)
Dept: NON INVASIVE DIAGNOSTICS | Age: 66
DRG: 314 | End: 2021-01-01
Attending: INTERNAL MEDICINE
Payer: MEDICARE

## 2021-01-01 ENCOUNTER — APPOINTMENT (OUTPATIENT)
Dept: GENERAL RADIOLOGY | Age: 66
DRG: 314 | End: 2021-01-01
Attending: INTERNAL MEDICINE
Payer: MEDICARE

## 2021-01-01 ENCOUNTER — APPOINTMENT (OUTPATIENT)
Dept: GENERAL RADIOLOGY | Age: 66
DRG: 314 | End: 2021-01-01
Attending: EMERGENCY MEDICINE
Payer: MEDICARE

## 2021-01-01 ENCOUNTER — ANESTHESIA (OUTPATIENT)
Dept: NON INVASIVE DIAGNOSTICS | Age: 66
DRG: 314 | End: 2021-01-01
Payer: MEDICARE

## 2021-01-01 ENCOUNTER — HOSPITAL ENCOUNTER (INPATIENT)
Age: 66
LOS: 15 days | DRG: 314 | End: 2021-03-11
Attending: EMERGENCY MEDICINE | Admitting: INTERNAL MEDICINE
Payer: MEDICARE

## 2021-01-01 ENCOUNTER — ANESTHESIA EVENT (OUTPATIENT)
Dept: NON INVASIVE DIAGNOSTICS | Age: 66
DRG: 314 | End: 2021-01-01
Payer: MEDICARE

## 2021-01-01 ENCOUNTER — ANESTHESIA (OUTPATIENT)
Dept: ANESTHESIOLOGY | Age: 66
DRG: 314 | End: 2021-01-01
Payer: MEDICARE

## 2021-01-01 ENCOUNTER — APPOINTMENT (OUTPATIENT)
Dept: GENERAL RADIOLOGY | Age: 66
DRG: 314 | End: 2021-01-01
Attending: HOSPITALIST
Payer: MEDICARE

## 2021-01-01 ENCOUNTER — APPOINTMENT (OUTPATIENT)
Dept: CT IMAGING | Age: 66
DRG: 314 | End: 2021-01-01
Attending: INTERNAL MEDICINE
Payer: MEDICARE

## 2021-01-01 ENCOUNTER — ANESTHESIA EVENT (OUTPATIENT)
Dept: ANESTHESIOLOGY | Age: 66
DRG: 314 | End: 2021-01-01
Payer: MEDICARE

## 2021-01-01 ENCOUNTER — APPOINTMENT (OUTPATIENT)
Dept: INTERVENTIONAL RADIOLOGY/VASCULAR | Age: 66
DRG: 314 | End: 2021-01-01
Attending: HOSPITALIST
Payer: MEDICARE

## 2021-01-01 VITALS
OXYGEN SATURATION: 88 % | HEART RATE: 90 BPM | BODY MASS INDEX: 35.59 KG/M2 | TEMPERATURE: 98.3 F | RESPIRATION RATE: 26 BRPM | HEIGHT: 63 IN | SYSTOLIC BLOOD PRESSURE: 70 MMHG | WEIGHT: 200.84 LBS | DIASTOLIC BLOOD PRESSURE: 26 MMHG

## 2021-01-01 DIAGNOSIS — J69.0 ASPIRATION PNEUMONIA OF LEFT UPPER LOBE, UNSPECIFIED ASPIRATION PNEUMONIA TYPE (HCC): ICD-10-CM

## 2021-01-01 DIAGNOSIS — B95.62 MRSA BACTEREMIA: ICD-10-CM

## 2021-01-01 DIAGNOSIS — R50.9 FEVER, UNSPECIFIED FEVER CAUSE: ICD-10-CM

## 2021-01-01 DIAGNOSIS — N18.6 ESRD (END STAGE RENAL DISEASE) (HCC): ICD-10-CM

## 2021-01-01 DIAGNOSIS — A41.9 SEPSIS, DUE TO UNSPECIFIED ORGANISM, UNSPECIFIED WHETHER ACUTE ORGAN DYSFUNCTION PRESENT (HCC): Primary | ICD-10-CM

## 2021-01-01 DIAGNOSIS — R78.81 MRSA BACTEREMIA: ICD-10-CM

## 2021-01-01 LAB
ABO + RH BLD: NORMAL
ALBUMIN SERPL-MCNC: 1.4 G/DL (ref 3.5–5)
ALBUMIN SERPL-MCNC: 1.9 G/DL (ref 3.5–5)
ALBUMIN SERPL-MCNC: 2 G/DL (ref 3.5–5)
ALBUMIN SERPL-MCNC: 2.6 G/DL (ref 3.5–5)
ALBUMIN SERPL-MCNC: 3.1 G/DL (ref 3.5–5)
ALBUMIN SERPL-MCNC: 3.7 G/DL (ref 3.5–5)
ALBUMIN/GLOB SERPL: 0.4 {RATIO} (ref 1.1–2.2)
ALBUMIN/GLOB SERPL: 0.8 {RATIO} (ref 1.1–2.2)
ALBUMIN/GLOB SERPL: 0.9 {RATIO} (ref 1.1–2.2)
ALP SERPL-CCNC: 127 U/L (ref 45–117)
ALP SERPL-CCNC: 166 U/L (ref 45–117)
ALP SERPL-CCNC: 91 U/L (ref 45–117)
ALT SERPL-CCNC: 122 U/L (ref 12–78)
ALT SERPL-CCNC: 15 U/L (ref 12–78)
ALT SERPL-CCNC: 19 U/L (ref 12–78)
ANION GAP SERPL CALC-SCNC: 10 MMOL/L (ref 5–15)
ANION GAP SERPL CALC-SCNC: 12 MMOL/L (ref 5–15)
ANION GAP SERPL CALC-SCNC: 13 MMOL/L (ref 5–15)
ANION GAP SERPL CALC-SCNC: 14 MMOL/L (ref 5–15)
ANION GAP SERPL CALC-SCNC: 15 MMOL/L (ref 5–15)
ANION GAP SERPL CALC-SCNC: 15 MMOL/L (ref 5–15)
ANION GAP SERPL CALC-SCNC: 20 MMOL/L (ref 5–15)
ANION GAP SERPL CALC-SCNC: 6 MMOL/L (ref 5–15)
ANION GAP SERPL CALC-SCNC: 8 MMOL/L (ref 5–15)
ANION GAP SERPL CALC-SCNC: 8 MMOL/L (ref 5–15)
ARTERIAL PATENCY WRIST A: ABNORMAL
AST SERPL W P-5'-P-CCNC: 14 U/L (ref 15–37)
AST SERPL W P-5'-P-CCNC: 17 U/L (ref 15–37)
AST SERPL W P-5'-P-CCNC: 366 U/L (ref 15–37)
ATRIAL RATE: 95 BPM
BACTERIA SPEC CULT: ABNORMAL
BACTERIA SPEC CULT: NORMAL
BASE DEFICIT BLDA-SCNC: 0.5 MMOL/L (ref 0–2)
BASE DEFICIT BLDA-SCNC: 1.6 MMOL/L (ref 0–2)
BASE DEFICIT BLDA-SCNC: 2.1 MMOL/L (ref 0–2)
BASOPHILS # BLD: 0 K/UL (ref 0–0.1)
BASOPHILS # BLD: 0.1 K/UL (ref 0–0.1)
BASOPHILS NFR BLD: 0 % (ref 0–1)
BASOPHILS NFR BLD: 1 % (ref 0–1)
BDY SITE: ABNORMAL
BILIRUB SERPL-MCNC: 0.3 MG/DL (ref 0.2–1)
BILIRUB SERPL-MCNC: 0.4 MG/DL (ref 0.2–1)
BILIRUB SERPL-MCNC: 0.5 MG/DL (ref 0.2–1)
BLD PROD TYP BPU: NORMAL
BLD PROD TYP BPU: NORMAL
BLOOD GROUP ANTIBODIES SERPL: NEGATIVE
BNP SERPL-MCNC: 1408 PG/ML
BPU ID: NORMAL
BPU ID: NORMAL
BUN SERPL-MCNC: 28 MG/DL (ref 6–20)
BUN SERPL-MCNC: 31 MG/DL (ref 6–20)
BUN SERPL-MCNC: 33 MG/DL (ref 6–20)
BUN SERPL-MCNC: 35 MG/DL (ref 6–20)
BUN SERPL-MCNC: 38 MG/DL (ref 6–20)
BUN SERPL-MCNC: 39 MG/DL (ref 6–20)
BUN SERPL-MCNC: 40 MG/DL (ref 6–20)
BUN SERPL-MCNC: 42 MG/DL (ref 6–20)
BUN SERPL-MCNC: 44 MG/DL (ref 6–20)
BUN SERPL-MCNC: 50 MG/DL (ref 6–20)
BUN SERPL-MCNC: 52 MG/DL (ref 6–20)
BUN SERPL-MCNC: 53 MG/DL (ref 6–20)
BUN SERPL-MCNC: 53 MG/DL (ref 6–20)
BUN SERPL-MCNC: 58 MG/DL (ref 6–20)
BUN/CREAT SERPL: 10 (ref 12–20)
BUN/CREAT SERPL: 10 (ref 12–20)
BUN/CREAT SERPL: 6 (ref 12–20)
BUN/CREAT SERPL: 7 (ref 12–20)
BUN/CREAT SERPL: 8 (ref 12–20)
BUN/CREAT SERPL: 9 (ref 12–20)
CA-I BLD-MCNC: 8.5 MG/DL (ref 8.5–10.1)
CA-I BLD-MCNC: 8.6 MG/DL (ref 8.5–10.1)
CA-I BLD-MCNC: 8.6 MG/DL (ref 8.5–10.1)
CA-I BLD-MCNC: 8.7 MG/DL (ref 8.5–10.1)
CA-I BLD-MCNC: 8.8 MG/DL (ref 8.5–10.1)
CA-I BLD-MCNC: 8.9 MG/DL (ref 8.5–10.1)
CA-I BLD-MCNC: 8.9 MG/DL (ref 8.5–10.1)
CA-I BLD-MCNC: 9 MG/DL (ref 8.5–10.1)
CA-I BLD-MCNC: 9 MG/DL (ref 8.5–10.1)
CA-I BLD-MCNC: 9.1 MG/DL (ref 8.5–10.1)
CA-I BLD-MCNC: 9.3 MG/DL (ref 8.5–10.1)
CA-I BLD-MCNC: 9.6 MG/DL (ref 8.5–10.1)
CALCULATED P AXIS, ECG09: 61 DEGREES
CALCULATED R AXIS, ECG10: -6 DEGREES
CALCULATED T AXIS, ECG11: -7 DEGREES
CHLORIDE SERPL-SCNC: 100 MMOL/L (ref 97–108)
CHLORIDE SERPL-SCNC: 104 MMOL/L (ref 97–108)
CHLORIDE SERPL-SCNC: 87 MMOL/L (ref 97–108)
CHLORIDE SERPL-SCNC: 87 MMOL/L (ref 97–108)
CHLORIDE SERPL-SCNC: 92 MMOL/L (ref 97–108)
CHLORIDE SERPL-SCNC: 93 MMOL/L (ref 97–108)
CHLORIDE SERPL-SCNC: 94 MMOL/L (ref 97–108)
CHLORIDE SERPL-SCNC: 95 MMOL/L (ref 97–108)
CHLORIDE SERPL-SCNC: 95 MMOL/L (ref 97–108)
CHLORIDE SERPL-SCNC: 96 MMOL/L (ref 97–108)
CHLORIDE SERPL-SCNC: 97 MMOL/L (ref 97–108)
CHLORIDE SERPL-SCNC: 98 MMOL/L (ref 97–108)
CHLORIDE SERPL-SCNC: 99 MMOL/L (ref 97–108)
CHLORIDE SERPL-SCNC: 99 MMOL/L (ref 97–108)
CK SERPL-CCNC: 42 U/L (ref 26–192)
CO2 SERPL-SCNC: 21 MMOL/L (ref 21–32)
CO2 SERPL-SCNC: 22 MMOL/L (ref 21–32)
CO2 SERPL-SCNC: 23 MMOL/L (ref 21–32)
CO2 SERPL-SCNC: 24 MMOL/L (ref 21–32)
CO2 SERPL-SCNC: 24 MMOL/L (ref 21–32)
CO2 SERPL-SCNC: 25 MMOL/L (ref 21–32)
CO2 SERPL-SCNC: 25 MMOL/L (ref 21–32)
CO2 SERPL-SCNC: 26 MMOL/L (ref 21–32)
CO2 SERPL-SCNC: 26 MMOL/L (ref 21–32)
CO2 SERPL-SCNC: 27 MMOL/L (ref 21–32)
COVID-19 RAPID TEST, COVR: NOT DETECTED
CREAT SERPL-MCNC: 3.78 MG/DL (ref 0.55–1.02)
CREAT SERPL-MCNC: 4.31 MG/DL (ref 0.55–1.02)
CREAT SERPL-MCNC: 4.37 MG/DL (ref 0.55–1.02)
CREAT SERPL-MCNC: 4.69 MG/DL (ref 0.55–1.02)
CREAT SERPL-MCNC: 4.69 MG/DL (ref 0.55–1.02)
CREAT SERPL-MCNC: 4.78 MG/DL (ref 0.55–1.02)
CREAT SERPL-MCNC: 4.79 MG/DL (ref 0.55–1.02)
CREAT SERPL-MCNC: 4.94 MG/DL (ref 0.55–1.02)
CREAT SERPL-MCNC: 5.07 MG/DL (ref 0.55–1.02)
CREAT SERPL-MCNC: 5.11 MG/DL (ref 0.55–1.02)
CREAT SERPL-MCNC: 5.21 MG/DL (ref 0.55–1.02)
CREAT SERPL-MCNC: 5.73 MG/DL (ref 0.55–1.02)
CREAT SERPL-MCNC: 6.06 MG/DL (ref 0.55–1.02)
CREAT SERPL-MCNC: 6.25 MG/DL (ref 0.55–1.02)
CREAT SERPL-MCNC: 6.69 MG/DL (ref 0.55–1.02)
CREAT SERPL-MCNC: 6.77 MG/DL (ref 0.55–1.02)
CROSSMATCH RESULT,%XM: NORMAL
CROSSMATCH RESULT,%XM: NORMAL
CRP SERPL-MCNC: 14.8 MG/DL (ref 0–0.6)
CRP SERPL-MCNC: 15.6 MG/DL (ref 0–0.6)
CRP SERPL-MCNC: 15.7 MG/DL (ref 0–0.6)
CRP SERPL-MCNC: 16.6 MG/DL (ref 0–0.6)
CRP SERPL-MCNC: 16.6 MG/DL (ref 0–0.6)
CRP SERPL-MCNC: 17.6 MG/DL (ref 0–0.6)
CRP SERPL-MCNC: 18.3 MG/DL (ref 0–0.6)
CRP SERPL-MCNC: 19 MG/DL (ref 0–0.6)
CRP SERPL-MCNC: 21.7 MG/DL (ref 0–0.6)
CRP SERPL-MCNC: 29 MG/DL (ref 0–0.6)
DATE LAST DOSE: NORMAL
DATE LAST DOSE: NORMAL
DIAGNOSIS, 93000: NORMAL
DIFFERENTIAL METHOD BLD: ABNORMAL
ECHO AV AREA PEAK VELOCITY: 2.14 CM2
ECHO AV AREA/BSA PEAK VELOCITY: 1.1 CM2/M2
ECHO AV PEAK GRADIENT: 14 MMHG
ECHO EST RA PRESSURE: 3 MMHG
ECHO LV E' SEPTAL VELOCITY: 6.5 CM/S
ECHO LV EDV A2C: 30.7 CM3
ECHO LV EJECTION FRACTION A2C: 32 %
ECHO LV EJECTION FRACTION BIPLANE: 68.1 % (ref 55–100)
ECHO LV ESV A2C: 9.8 CM3
ECHO LV INTERNAL DIMENSION DIASTOLIC: 3.13 CM (ref 3.9–5.3)
ECHO LV INTERNAL DIMENSION SYSTOLIC: 2.14 CM
ECHO LV IVSD: 1.4 CM (ref 0.6–0.9)
ECHO LV MASS 2D: 144.2 G (ref 67–162)
ECHO LV MASS INDEX 2D: 75 G/M2 (ref 43–95)
ECHO LV POSTERIOR WALL DIASTOLIC: 1.35 CM (ref 0.6–0.9)
ECHO LVOT DIAM: 2 CM
ECHO LVOT PEAK GRADIENT: 6 MMHG
ECHO LVOT SV: 90 CM3
ECHO LVOT VTI: 28.6 CM
ECHO LVOT VTI: 28.6 CM
ECHO MV A VELOCITY: 173 CM/S
ECHO MV AREA PHT: 2.86 CM2
ECHO MV E DECELERATION TIME (DT): 274 MS
ECHO MV E VELOCITY: 132 CM/S
ECHO MV E/A RATIO: 0.76
ECHO MV E/E' SEPTAL: 20.31
ECHO MV MAX VELOCITY: 205 CM/S
ECHO MV MEAN GRADIENT: 6 MMHG
ECHO MV PEAK GRADIENT: 17 MMHG
ECHO MV PRESSURE HALF TIME (PHT): 77 MS
ECHO MV VTI: 60.3 CM
ECHO PV PEAK INSTANTANEOUS GRADIENT SYSTOLIC: 5 MMHG
ECHO PV REGURGITANT MAX VELOCITY: 113 CM/S
ECHO PV REGURGITANT MAX VELOCITY: 126 CM/S
ECHO PV REGURGITANT MAX VELOCITY: 185 CM/S
ECHO PVEIN A DURATION: 134 MS
ECHO PVEIN A VELOCITY: 31.6 CM/S
ECHO RA AREA 4C: 14.42 CM2
ECHO RIGHT VENTRICULAR SYSTOLIC PRESSURE (RVSP): 27 MMHG
ECHO RV INTERNAL DIMENSION: 3.85 CM
ECHO TV MAX VELOCITY: 246 CM/S
ECHO TV REGURGITANT PEAK GRADIENT: 24 MMHG
EOSINOPHIL # BLD: 0 K/UL (ref 0–0.4)
EOSINOPHIL # BLD: 0.1 K/UL (ref 0–0.4)
EOSINOPHIL # BLD: 0.2 K/UL (ref 0–0.4)
EOSINOPHIL # BLD: 0.6 K/UL (ref 0–0.4)
EOSINOPHIL # BLD: 0.7 K/UL (ref 0–0.4)
EOSINOPHIL # BLD: 0.8 K/UL (ref 0–0.4)
EOSINOPHIL NFR BLD: 0 % (ref 0–7)
EOSINOPHIL NFR BLD: 1 % (ref 0–7)
EOSINOPHIL NFR BLD: 2 % (ref 0–7)
EOSINOPHIL NFR BLD: 5 % (ref 0–7)
EOSINOPHIL NFR BLD: 6 % (ref 0–7)
EOSINOPHIL NFR BLD: 7 % (ref 0–7)
EPAP/CPAP/PEEP, PAPEEP: 0
EPAP/CPAP/PEEP, PAPEEP: 10
EPAP/CPAP/PEEP, PAPEEP: 7
ERYTHROCYTE [DISTWIDTH] IN BLOOD BY AUTOMATED COUNT: 14.2 % (ref 11.5–14.5)
ERYTHROCYTE [DISTWIDTH] IN BLOOD BY AUTOMATED COUNT: 14.5 % (ref 11.5–14.5)
ERYTHROCYTE [DISTWIDTH] IN BLOOD BY AUTOMATED COUNT: 14.6 % (ref 11.5–14.5)
ERYTHROCYTE [DISTWIDTH] IN BLOOD BY AUTOMATED COUNT: 14.7 % (ref 11.5–14.5)
ERYTHROCYTE [DISTWIDTH] IN BLOOD BY AUTOMATED COUNT: 14.7 % (ref 11.5–14.5)
ERYTHROCYTE [DISTWIDTH] IN BLOOD BY AUTOMATED COUNT: 14.8 % (ref 11.5–14.5)
ERYTHROCYTE [DISTWIDTH] IN BLOOD BY AUTOMATED COUNT: 15.2 % (ref 11.5–14.5)
ERYTHROCYTE [DISTWIDTH] IN BLOOD BY AUTOMATED COUNT: 15.2 % (ref 11.5–14.5)
ERYTHROCYTE [DISTWIDTH] IN BLOOD BY AUTOMATED COUNT: 15.5 % (ref 11.5–14.5)
ERYTHROCYTE [DISTWIDTH] IN BLOOD BY AUTOMATED COUNT: 15.5 % (ref 11.5–14.5)
ERYTHROCYTE [DISTWIDTH] IN BLOOD BY AUTOMATED COUNT: 15.7 % (ref 11.5–14.5)
ERYTHROCYTE [DISTWIDTH] IN BLOOD BY AUTOMATED COUNT: 16.2 % (ref 11.5–14.5)
EST. AVERAGE GLUCOSE BLD GHB EST-MCNC: 200 MG/DL
FIO2 ON VENT: 50 %
FIO2 ON VENT: 90 %
GAS FLOW.O2 O2 DELIVERY SYS: 10 L/MIN
GAS FLOW.O2 SETTING OXYMISER: 14 L/MIN
GAS FLOW.O2 SETTING OXYMISER: 18 L/MIN
GLOBULIN SER CALC-MCNC: 3.8 G/DL (ref 2–4)
GLOBULIN SER CALC-MCNC: 4 G/DL (ref 2–4)
GLOBULIN SER CALC-MCNC: 4.1 G/DL (ref 2–4)
GLUCOSE BLD STRIP.AUTO-MCNC: 130 MG/DL (ref 65–100)
GLUCOSE BLD STRIP.AUTO-MCNC: 139 MG/DL (ref 65–100)
GLUCOSE BLD STRIP.AUTO-MCNC: 139 MG/DL (ref 65–100)
GLUCOSE BLD STRIP.AUTO-MCNC: 140 MG/DL (ref 65–100)
GLUCOSE BLD STRIP.AUTO-MCNC: 141 MG/DL (ref 65–100)
GLUCOSE BLD STRIP.AUTO-MCNC: 151 MG/DL (ref 65–100)
GLUCOSE BLD STRIP.AUTO-MCNC: 163 MG/DL (ref 65–100)
GLUCOSE BLD STRIP.AUTO-MCNC: 164 MG/DL (ref 65–100)
GLUCOSE BLD STRIP.AUTO-MCNC: 165 MG/DL (ref 65–100)
GLUCOSE BLD STRIP.AUTO-MCNC: 165 MG/DL (ref 65–100)
GLUCOSE BLD STRIP.AUTO-MCNC: 168 MG/DL (ref 65–100)
GLUCOSE BLD STRIP.AUTO-MCNC: 168 MG/DL (ref 65–100)
GLUCOSE BLD STRIP.AUTO-MCNC: 171 MG/DL (ref 65–100)
GLUCOSE BLD STRIP.AUTO-MCNC: 172 MG/DL (ref 65–100)
GLUCOSE BLD STRIP.AUTO-MCNC: 174 MG/DL (ref 65–100)
GLUCOSE BLD STRIP.AUTO-MCNC: 175 MG/DL (ref 65–100)
GLUCOSE BLD STRIP.AUTO-MCNC: 176 MG/DL (ref 65–100)
GLUCOSE BLD STRIP.AUTO-MCNC: 178 MG/DL (ref 65–100)
GLUCOSE BLD STRIP.AUTO-MCNC: 179 MG/DL (ref 65–100)
GLUCOSE BLD STRIP.AUTO-MCNC: 185 MG/DL (ref 65–100)
GLUCOSE BLD STRIP.AUTO-MCNC: 190 MG/DL (ref 65–100)
GLUCOSE BLD STRIP.AUTO-MCNC: 194 MG/DL (ref 65–100)
GLUCOSE BLD STRIP.AUTO-MCNC: 196 MG/DL (ref 65–100)
GLUCOSE BLD STRIP.AUTO-MCNC: 204 MG/DL (ref 65–100)
GLUCOSE BLD STRIP.AUTO-MCNC: 207 MG/DL (ref 65–100)
GLUCOSE BLD STRIP.AUTO-MCNC: 208 MG/DL (ref 65–100)
GLUCOSE BLD STRIP.AUTO-MCNC: 211 MG/DL (ref 65–100)
GLUCOSE BLD STRIP.AUTO-MCNC: 213 MG/DL (ref 65–100)
GLUCOSE BLD STRIP.AUTO-MCNC: 214 MG/DL (ref 65–100)
GLUCOSE BLD STRIP.AUTO-MCNC: 217 MG/DL (ref 65–100)
GLUCOSE BLD STRIP.AUTO-MCNC: 221 MG/DL (ref 65–100)
GLUCOSE BLD STRIP.AUTO-MCNC: 231 MG/DL (ref 65–100)
GLUCOSE BLD STRIP.AUTO-MCNC: 232 MG/DL (ref 65–100)
GLUCOSE BLD STRIP.AUTO-MCNC: 233 MG/DL (ref 65–100)
GLUCOSE BLD STRIP.AUTO-MCNC: 233 MG/DL (ref 65–100)
GLUCOSE BLD STRIP.AUTO-MCNC: 235 MG/DL (ref 65–100)
GLUCOSE BLD STRIP.AUTO-MCNC: 238 MG/DL (ref 65–100)
GLUCOSE BLD STRIP.AUTO-MCNC: 243 MG/DL (ref 65–100)
GLUCOSE BLD STRIP.AUTO-MCNC: 244 MG/DL (ref 65–100)
GLUCOSE BLD STRIP.AUTO-MCNC: 246 MG/DL (ref 65–100)
GLUCOSE BLD STRIP.AUTO-MCNC: 255 MG/DL (ref 65–100)
GLUCOSE BLD STRIP.AUTO-MCNC: 256 MG/DL (ref 65–100)
GLUCOSE BLD STRIP.AUTO-MCNC: 259 MG/DL (ref 65–100)
GLUCOSE BLD STRIP.AUTO-MCNC: 265 MG/DL (ref 65–100)
GLUCOSE BLD STRIP.AUTO-MCNC: 266 MG/DL (ref 65–100)
GLUCOSE BLD STRIP.AUTO-MCNC: 267 MG/DL (ref 65–100)
GLUCOSE BLD STRIP.AUTO-MCNC: 273 MG/DL (ref 65–100)
GLUCOSE BLD STRIP.AUTO-MCNC: 275 MG/DL (ref 65–100)
GLUCOSE BLD STRIP.AUTO-MCNC: 278 MG/DL (ref 65–100)
GLUCOSE BLD STRIP.AUTO-MCNC: 281 MG/DL (ref 65–100)
GLUCOSE BLD STRIP.AUTO-MCNC: 292 MG/DL (ref 65–100)
GLUCOSE BLD STRIP.AUTO-MCNC: 293 MG/DL (ref 65–100)
GLUCOSE BLD STRIP.AUTO-MCNC: 293 MG/DL (ref 65–100)
GLUCOSE BLD STRIP.AUTO-MCNC: 297 MG/DL (ref 65–100)
GLUCOSE BLD STRIP.AUTO-MCNC: 300 MG/DL (ref 65–100)
GLUCOSE BLD STRIP.AUTO-MCNC: 310 MG/DL (ref 65–100)
GLUCOSE BLD STRIP.AUTO-MCNC: 390 MG/DL (ref 65–100)
GLUCOSE SERPL-MCNC: 148 MG/DL (ref 65–100)
GLUCOSE SERPL-MCNC: 177 MG/DL (ref 65–100)
GLUCOSE SERPL-MCNC: 181 MG/DL (ref 65–100)
GLUCOSE SERPL-MCNC: 193 MG/DL (ref 65–100)
GLUCOSE SERPL-MCNC: 195 MG/DL (ref 65–100)
GLUCOSE SERPL-MCNC: 227 MG/DL (ref 65–100)
GLUCOSE SERPL-MCNC: 232 MG/DL (ref 65–100)
GLUCOSE SERPL-MCNC: 244 MG/DL (ref 65–100)
GLUCOSE SERPL-MCNC: 245 MG/DL (ref 65–100)
GLUCOSE SERPL-MCNC: 252 MG/DL (ref 65–100)
GLUCOSE SERPL-MCNC: 262 MG/DL (ref 65–100)
GLUCOSE SERPL-MCNC: 265 MG/DL (ref 65–100)
GLUCOSE SERPL-MCNC: 267 MG/DL (ref 65–100)
GLUCOSE SERPL-MCNC: 272 MG/DL (ref 65–100)
GLUCOSE SERPL-MCNC: 277 MG/DL (ref 65–100)
GLUCOSE SERPL-MCNC: 316 MG/DL (ref 65–100)
HBA1C MFR BLD: 8.6 % (ref 4–5.6)
HCO3 BLDA-SCNC: 23 MMOL/L (ref 22–26)
HCO3 BLDA-SCNC: 23 MMOL/L (ref 22–26)
HCO3 BLDA-SCNC: 24 MMOL/L (ref 22–26)
HCT VFR BLD AUTO: 22.2 % (ref 35–47)
HCT VFR BLD AUTO: 22.4 % (ref 35–47)
HCT VFR BLD AUTO: 22.8 % (ref 35–47)
HCT VFR BLD AUTO: 23.1 % (ref 35–47)
HCT VFR BLD AUTO: 24.4 % (ref 35–47)
HCT VFR BLD AUTO: 25.1 % (ref 35–47)
HCT VFR BLD AUTO: 25.8 % (ref 35–47)
HCT VFR BLD AUTO: 27 % (ref 35–47)
HCT VFR BLD AUTO: 27 % (ref 35–47)
HCT VFR BLD AUTO: 27.2 % (ref 35–47)
HCT VFR BLD AUTO: 28.9 % (ref 35–47)
HCT VFR BLD AUTO: 29 % (ref 35–47)
HCT VFR BLD AUTO: 29.2 % (ref 35–47)
HCT VFR BLD AUTO: 29.5 % (ref 35–47)
HCT VFR BLD AUTO: 31.2 % (ref 35–47)
HCT VFR BLD AUTO: 33.3 % (ref 35–47)
HGB BLD-MCNC: 10.9 G/DL (ref 11.5–16)
HGB BLD-MCNC: 6.9 G/DL (ref 11.5–16)
HGB BLD-MCNC: 7 G/DL (ref 11.5–16)
HGB BLD-MCNC: 7.3 G/DL (ref 11.5–16)
HGB BLD-MCNC: 7.3 G/DL (ref 11.5–16)
HGB BLD-MCNC: 7.7 G/DL (ref 11.5–16)
HGB BLD-MCNC: 7.9 G/DL (ref 11.5–16)
HGB BLD-MCNC: 8.4 G/DL (ref 11.5–16)
HGB BLD-MCNC: 8.5 G/DL (ref 11.5–16)
HGB BLD-MCNC: 8.5 G/DL (ref 11.5–16)
HGB BLD-MCNC: 8.7 G/DL (ref 11.5–16)
HGB BLD-MCNC: 9 G/DL (ref 11.5–16)
HGB BLD-MCNC: 9.2 G/DL (ref 11.5–16)
HGB BLD-MCNC: 9.4 G/DL (ref 11.5–16)
HGB BLD-MCNC: 9.4 G/DL (ref 11.5–16)
HGB BLD-MCNC: 9.9 G/DL (ref 11.5–16)
IMM GRANULOCYTES # BLD AUTO: 0 K/UL (ref 0–0.04)
IMM GRANULOCYTES # BLD AUTO: 0.1 K/UL (ref 0–0.04)
IMM GRANULOCYTES # BLD AUTO: 0.1 K/UL (ref 0–0.04)
IMM GRANULOCYTES # BLD AUTO: 0.2 K/UL (ref 0–0.04)
IMM GRANULOCYTES # BLD AUTO: 0.3 K/UL (ref 0–0.04)
IMM GRANULOCYTES # BLD AUTO: 0.4 K/UL (ref 0–0.04)
IMM GRANULOCYTES # BLD AUTO: 0.5 K/UL (ref 0–0.04)
IMM GRANULOCYTES NFR BLD AUTO: 0 % (ref 0–0.5)
IMM GRANULOCYTES NFR BLD AUTO: 1 % (ref 0–0.5)
IMM GRANULOCYTES NFR BLD AUTO: 2 % (ref 0–0.5)
IMM GRANULOCYTES NFR BLD AUTO: 2 % (ref 0–0.5)
IMM GRANULOCYTES NFR BLD AUTO: 3 % (ref 0–0.5)
IMM GRANULOCYTES NFR BLD AUTO: 3 % (ref 0–0.5)
IMM GRANULOCYTES NFR BLD AUTO: 4 % (ref 0–0.5)
LACTATE SERPL-SCNC: 13.4 MMOL/L (ref 0.4–2)
LACTATE SERPL-SCNC: 3.2 MMOL/L (ref 0.4–2)
LVOT MG: 4 MMHG
LYMPHOCYTES # BLD: 0.6 K/UL (ref 0.8–3.5)
LYMPHOCYTES # BLD: 0.8 K/UL (ref 0.8–3.5)
LYMPHOCYTES # BLD: 1 K/UL (ref 0.8–3.5)
LYMPHOCYTES # BLD: 1.1 K/UL (ref 0.8–3.5)
LYMPHOCYTES # BLD: 1.2 K/UL (ref 0.8–3.5)
LYMPHOCYTES # BLD: 1.5 K/UL (ref 0.8–3.5)
LYMPHOCYTES # BLD: 1.6 K/UL (ref 0.8–3.5)
LYMPHOCYTES # BLD: 1.7 K/UL (ref 0.8–3.5)
LYMPHOCYTES # BLD: 1.7 K/UL (ref 0.8–3.5)
LYMPHOCYTES # BLD: 1.8 K/UL (ref 0.8–3.5)
LYMPHOCYTES # BLD: 1.8 K/UL (ref 0.8–3.5)
LYMPHOCYTES # BLD: 2 K/UL (ref 0.8–3.5)
LYMPHOCYTES NFR BLD: 11 % (ref 12–49)
LYMPHOCYTES NFR BLD: 12 % (ref 12–49)
LYMPHOCYTES NFR BLD: 14 % (ref 12–49)
LYMPHOCYTES NFR BLD: 3 % (ref 12–49)
LYMPHOCYTES NFR BLD: 5 % (ref 12–49)
LYMPHOCYTES NFR BLD: 6 % (ref 12–49)
LYMPHOCYTES NFR BLD: 7 % (ref 12–49)
LYMPHOCYTES NFR BLD: 8 % (ref 12–49)
LYMPHOCYTES NFR BLD: 9 % (ref 12–49)
LYMPHOCYTES NFR BLD: 9 % (ref 12–49)
MAGNESIUM SERPL-MCNC: 2.2 MG/DL (ref 1.6–2.4)
MAGNESIUM SERPL-MCNC: 2.4 MG/DL (ref 1.6–2.4)
MAGNESIUM SERPL-MCNC: 2.5 MG/DL (ref 1.6–2.4)
MAGNESIUM SERPL-MCNC: 2.6 MG/DL (ref 1.6–2.4)
MCH RBC QN AUTO: 29.4 PG (ref 26–34)
MCH RBC QN AUTO: 29.5 PG (ref 26–34)
MCH RBC QN AUTO: 29.6 PG (ref 26–34)
MCH RBC QN AUTO: 29.6 PG (ref 26–34)
MCH RBC QN AUTO: 29.7 PG (ref 26–34)
MCH RBC QN AUTO: 29.8 PG (ref 26–34)
MCH RBC QN AUTO: 29.9 PG (ref 26–34)
MCH RBC QN AUTO: 30 PG (ref 26–34)
MCH RBC QN AUTO: 30 PG (ref 26–34)
MCH RBC QN AUTO: 30.4 PG (ref 26–34)
MCH RBC QN AUTO: 30.4 PG (ref 26–34)
MCH RBC QN AUTO: 30.5 PG (ref 26–34)
MCHC RBC AUTO-ENTMCNC: 30.7 G/DL (ref 30–36.5)
MCHC RBC AUTO-ENTMCNC: 31.1 G/DL (ref 30–36.5)
MCHC RBC AUTO-ENTMCNC: 31.1 G/DL (ref 30–36.5)
MCHC RBC AUTO-ENTMCNC: 31.3 G/DL (ref 30–36.5)
MCHC RBC AUTO-ENTMCNC: 31.5 G/DL (ref 30–36.5)
MCHC RBC AUTO-ENTMCNC: 31.5 G/DL (ref 30–36.5)
MCHC RBC AUTO-ENTMCNC: 31.6 G/DL (ref 30–36.5)
MCHC RBC AUTO-ENTMCNC: 31.7 G/DL (ref 30–36.5)
MCHC RBC AUTO-ENTMCNC: 31.7 G/DL (ref 30–36.5)
MCHC RBC AUTO-ENTMCNC: 31.9 G/DL (ref 30–36.5)
MCHC RBC AUTO-ENTMCNC: 32 G/DL (ref 30–36.5)
MCHC RBC AUTO-ENTMCNC: 32 G/DL (ref 30–36.5)
MCHC RBC AUTO-ENTMCNC: 32.2 G/DL (ref 30–36.5)
MCHC RBC AUTO-ENTMCNC: 32.4 G/DL (ref 30–36.5)
MCHC RBC AUTO-ENTMCNC: 32.6 G/DL (ref 30–36.5)
MCHC RBC AUTO-ENTMCNC: 32.7 G/DL (ref 30–36.5)
MCV RBC AUTO: 91.2 FL (ref 80–99)
MCV RBC AUTO: 91.5 FL (ref 80–99)
MCV RBC AUTO: 92.2 FL (ref 80–99)
MCV RBC AUTO: 92.5 FL (ref 80–99)
MCV RBC AUTO: 92.7 FL (ref 80–99)
MCV RBC AUTO: 92.8 FL (ref 80–99)
MCV RBC AUTO: 93 FL (ref 80–99)
MCV RBC AUTO: 93.9 FL (ref 80–99)
MCV RBC AUTO: 94.2 FL (ref 80–99)
MCV RBC AUTO: 94.5 FL (ref 80–99)
MCV RBC AUTO: 94.7 FL (ref 80–99)
MCV RBC AUTO: 94.9 FL (ref 80–99)
MCV RBC AUTO: 95.7 FL (ref 80–99)
MCV RBC AUTO: 96.3 FL (ref 80–99)
MCV RBC AUTO: 96.5 FL (ref 80–99)
MCV RBC AUTO: 96.8 FL (ref 80–99)
MONOCYTES # BLD: 0.6 K/UL (ref 0–1)
MONOCYTES # BLD: 1 K/UL (ref 0–1)
MONOCYTES # BLD: 1.1 K/UL (ref 0–1)
MONOCYTES # BLD: 1.2 K/UL (ref 0–1)
MONOCYTES # BLD: 1.3 K/UL (ref 0–1)
MONOCYTES # BLD: 1.4 K/UL (ref 0–1)
MONOCYTES # BLD: 1.5 K/UL (ref 0–1)
MONOCYTES # BLD: 1.7 K/UL (ref 0–1)
MONOCYTES # BLD: 1.8 K/UL (ref 0–1)
MONOCYTES # BLD: 2 K/UL (ref 0–1)
MONOCYTES NFR BLD: 11 % (ref 5–13)
MONOCYTES NFR BLD: 12 % (ref 5–13)
MONOCYTES NFR BLD: 14 % (ref 5–13)
MONOCYTES NFR BLD: 15 % (ref 5–13)
MONOCYTES NFR BLD: 4 % (ref 5–13)
MONOCYTES NFR BLD: 5 % (ref 5–13)
MONOCYTES NFR BLD: 7 % (ref 5–13)
MONOCYTES NFR BLD: 7 % (ref 5–13)
MONOCYTES NFR BLD: 8 % (ref 5–13)
MONOCYTES NFR BLD: 8 % (ref 5–13)
MV DEC SLOPE: 5410 MM/S2
MV DEC SLOPE: 5410 MM/S2
NEUTS SEG # BLD: 10 K/UL (ref 1.8–8)
NEUTS SEG # BLD: 10.2 K/UL (ref 1.8–8)
NEUTS SEG # BLD: 12.1 K/UL (ref 1.8–8)
NEUTS SEG # BLD: 12.7 K/UL (ref 1.8–8)
NEUTS SEG # BLD: 16.9 K/UL (ref 1.8–8)
NEUTS SEG # BLD: 17.5 K/UL (ref 1.8–8)
NEUTS SEG # BLD: 19.9 K/UL (ref 1.8–8)
NEUTS SEG # BLD: 23.3 K/UL (ref 1.8–8)
NEUTS SEG # BLD: 28.4 K/UL (ref 1.8–8)
NEUTS SEG # BLD: 8.1 K/UL (ref 1.8–8)
NEUTS SEG # BLD: 8.6 K/UL (ref 1.8–8)
NEUTS SEG # BLD: 9.5 K/UL (ref 1.8–8)
NEUTS SEG # BLD: 9.8 K/UL (ref 1.8–8)
NEUTS SEG # BLD: 9.9 K/UL (ref 1.8–8)
NEUTS SEG NFR BLD: 68 % (ref 32–75)
NEUTS SEG NFR BLD: 68 % (ref 32–75)
NEUTS SEG NFR BLD: 69 % (ref 32–75)
NEUTS SEG NFR BLD: 72 % (ref 32–75)
NEUTS SEG NFR BLD: 74 % (ref 32–75)
NEUTS SEG NFR BLD: 81 % (ref 32–75)
NEUTS SEG NFR BLD: 81 % (ref 32–75)
NEUTS SEG NFR BLD: 82 % (ref 32–75)
NEUTS SEG NFR BLD: 83 % (ref 32–75)
NEUTS SEG NFR BLD: 85 % (ref 32–75)
NEUTS SEG NFR BLD: 86 % (ref 32–75)
NEUTS SEG NFR BLD: 87 % (ref 32–75)
NEUTS SEG NFR BLD: 88 % (ref 32–75)
NEUTS SEG NFR BLD: 91 % (ref 32–75)
NRBC # BLD: 0 K/UL (ref 0–0.01)
NRBC BLD-RTO: 0 PER 100 WBC
P-R INTERVAL, ECG05: 188 MS
PCO2 BLDA: 33 MMHG (ref 35–45)
PCO2 BLDA: 38 MMHG (ref 35–45)
PCO2 BLDA: 42 MMHG (ref 35–45)
PERFORMED BY, TECHID: ABNORMAL
PH BLDA: 7.36 [PH] (ref 7.35–7.45)
PH BLDA: 7.39 [PH] (ref 7.35–7.45)
PH BLDA: 7.45 [PH] (ref 7.35–7.45)
PHOSPHATE SERPL-MCNC: 4.4 MG/DL (ref 2.6–4.7)
PHOSPHATE SERPL-MCNC: 5.6 MG/DL (ref 2.6–4.7)
PHOSPHATE SERPL-MCNC: 6.8 MG/DL (ref 2.6–4.7)
PLATELET # BLD AUTO: 118 K/UL (ref 150–400)
PLATELET # BLD AUTO: 126 K/UL (ref 150–400)
PLATELET # BLD AUTO: 126 K/UL (ref 150–400)
PLATELET # BLD AUTO: 129 K/UL (ref 150–400)
PLATELET # BLD AUTO: 168 K/UL (ref 150–400)
PLATELET # BLD AUTO: 181 K/UL (ref 150–400)
PLATELET # BLD AUTO: 198 K/UL (ref 150–400)
PLATELET # BLD AUTO: 213 K/UL (ref 150–400)
PLATELET # BLD AUTO: 219 K/UL (ref 150–400)
PLATELET # BLD AUTO: 224 K/UL (ref 150–400)
PLATELET # BLD AUTO: 224 K/UL (ref 150–400)
PLATELET # BLD AUTO: 226 K/UL (ref 150–400)
PLATELET # BLD AUTO: 227 K/UL (ref 150–400)
PLATELET # BLD AUTO: 228 K/UL (ref 150–400)
PLATELET # BLD AUTO: 230 K/UL (ref 150–400)
PLATELET # BLD AUTO: 235 K/UL (ref 150–400)
PMV BLD AUTO: 11.4 FL (ref 8.9–12.9)
PMV BLD AUTO: 11.7 FL (ref 8.9–12.9)
PMV BLD AUTO: 11.8 FL (ref 8.9–12.9)
PMV BLD AUTO: 11.9 FL (ref 8.9–12.9)
PMV BLD AUTO: 11.9 FL (ref 8.9–12.9)
PMV BLD AUTO: 12 FL (ref 8.9–12.9)
PMV BLD AUTO: 12.1 FL (ref 8.9–12.9)
PMV BLD AUTO: 12.1 FL (ref 8.9–12.9)
PMV BLD AUTO: 12.5 FL (ref 8.9–12.9)
PMV BLD AUTO: 12.7 FL (ref 8.9–12.9)
PMV BLD AUTO: 12.8 FL (ref 8.9–12.9)
PMV BLD AUTO: 13 FL (ref 8.9–12.9)
PMV BLD AUTO: 13.1 FL (ref 8.9–12.9)
PMV BLD AUTO: 13.5 FL (ref 8.9–12.9)
PO2 BLDA: 57 MMHG (ref 75–100)
PO2 BLDA: 59 MMHG (ref 75–100)
PO2 BLDA: 83 MMHG (ref 75–100)
POTASSIUM SERPL-SCNC: 3.3 MMOL/L (ref 3.5–5.1)
POTASSIUM SERPL-SCNC: 3.4 MMOL/L (ref 3.5–5.1)
POTASSIUM SERPL-SCNC: 3.4 MMOL/L (ref 3.5–5.1)
POTASSIUM SERPL-SCNC: 3.6 MMOL/L (ref 3.5–5.1)
POTASSIUM SERPL-SCNC: 3.7 MMOL/L (ref 3.5–5.1)
POTASSIUM SERPL-SCNC: 3.9 MMOL/L (ref 3.5–5.1)
POTASSIUM SERPL-SCNC: 4 MMOL/L (ref 3.5–5.1)
POTASSIUM SERPL-SCNC: 4.1 MMOL/L (ref 3.5–5.1)
POTASSIUM SERPL-SCNC: 4.3 MMOL/L (ref 3.5–5.1)
POTASSIUM SERPL-SCNC: 4.6 MMOL/L (ref 3.5–5.1)
POTASSIUM SERPL-SCNC: 5 MMOL/L (ref 3.5–5.1)
POTASSIUM SERPL-SCNC: 5.2 MMOL/L (ref 3.5–5.1)
POTASSIUM SERPL-SCNC: 5.6 MMOL/L (ref 3.5–5.1)
PROCALCITONIN SERPL-MCNC: 11.66 NG/ML
PROCALCITONIN SERPL-MCNC: 128.41 NG/ML
PROCALCITONIN SERPL-MCNC: 17 NG/ML
PROCALCITONIN SERPL-MCNC: 17.02 NG/ML
PROCALCITONIN SERPL-MCNC: 180.97 NG/ML
PROCALCITONIN SERPL-MCNC: 3.46 NG/ML
PROCALCITONIN SERPL-MCNC: 3.66 NG/ML
PROCALCITONIN SERPL-MCNC: 3.75 NG/ML
PROCALCITONIN SERPL-MCNC: 41.16 NG/ML
PROCALCITONIN SERPL-MCNC: 6.32 NG/ML
PROT SERPL-MCNC: 5.4 G/DL (ref 6.4–8.2)
PROT SERPL-MCNC: 6.9 G/DL (ref 6.4–8.2)
PROT SERPL-MCNC: 7.8 G/DL (ref 6.4–8.2)
Q-T INTERVAL, ECG07: 384 MS
QRS DURATION, ECG06: 76 MS
QTC CALCULATION (BEZET), ECG08: 482 MS
RBC # BLD AUTO: 2.34 M/UL (ref 3.8–5.2)
RBC # BLD AUTO: 2.37 M/UL (ref 3.8–5.2)
RBC # BLD AUTO: 2.46 M/UL (ref 3.8–5.2)
RBC # BLD AUTO: 2.46 M/UL (ref 3.8–5.2)
RBC # BLD AUTO: 2.6 M/UL (ref 3.8–5.2)
RBC # BLD AUTO: 2.63 M/UL (ref 3.8–5.2)
RBC # BLD AUTO: 2.79 M/UL (ref 3.8–5.2)
RBC # BLD AUTO: 2.83 M/UL (ref 3.8–5.2)
RBC # BLD AUTO: 2.85 M/UL (ref 3.8–5.2)
RBC # BLD AUTO: 2.94 M/UL (ref 3.8–5.2)
RBC # BLD AUTO: 3 M/UL (ref 3.8–5.2)
RBC # BLD AUTO: 3.08 M/UL (ref 3.8–5.2)
RBC # BLD AUTO: 3.19 M/UL (ref 3.8–5.2)
RBC # BLD AUTO: 3.2 M/UL (ref 3.8–5.2)
RBC # BLD AUTO: 3.26 M/UL (ref 3.8–5.2)
RBC # BLD AUTO: 3.58 M/UL (ref 3.8–5.2)
REPORTED DOSE,DOSE: NORMAL UNITS
REPORTED DOSE,DOSE: NORMAL UNITS
SAO2 % BLD: 90 %
SAO2 % BLD: 91 %
SAO2 % BLD: 95 %
SAO2% DEVICE SAO2% SENSOR NAME: ABNORMAL
SARS-COV-2, COV2: NORMAL
SERVICE CMNT-IMP: ABNORMAL
SODIUM SERPL-SCNC: 123 MMOL/L (ref 136–145)
SODIUM SERPL-SCNC: 123 MMOL/L (ref 136–145)
SODIUM SERPL-SCNC: 126 MMOL/L (ref 136–145)
SODIUM SERPL-SCNC: 129 MMOL/L (ref 136–145)
SODIUM SERPL-SCNC: 129 MMOL/L (ref 136–145)
SODIUM SERPL-SCNC: 130 MMOL/L (ref 136–145)
SODIUM SERPL-SCNC: 130 MMOL/L (ref 136–145)
SODIUM SERPL-SCNC: 131 MMOL/L (ref 136–145)
SODIUM SERPL-SCNC: 131 MMOL/L (ref 136–145)
SODIUM SERPL-SCNC: 133 MMOL/L (ref 136–145)
SODIUM SERPL-SCNC: 134 MMOL/L (ref 136–145)
SODIUM SERPL-SCNC: 134 MMOL/L (ref 136–145)
SODIUM SERPL-SCNC: 135 MMOL/L (ref 136–145)
SODIUM SERPL-SCNC: 136 MMOL/L (ref 136–145)
SODIUM SERPL-SCNC: 136 MMOL/L (ref 136–145)
SODIUM SERPL-SCNC: 145 MMOL/L (ref 136–145)
SPECIAL REQUESTS,SREQ: ABNORMAL
SPECIAL REQUESTS,SREQ: NORMAL
SPECIMEN EXP DATE BLD: NORMAL
SPECIMEN SOURCE: NORMAL
STATUS OF UNIT,%ST: NORMAL
STATUS OF UNIT,%ST: NORMAL
TRANSFUSION STATUS PATIENT QL: NORMAL
TRANSFUSION STATUS PATIENT QL: NORMAL
TROPONIN I SERPL-MCNC: <0.05 NG/ML
UNIT DIVISION, %UDIV: 0
UNIT DIVISION, %UDIV: 0
VANCOMYCIN SERPL-MCNC: 11.7 UG/ML
VANCOMYCIN SERPL-MCNC: 14.4 UG/ML
VANCOMYCIN SERPL-MCNC: 28.2 UG/ML
VENTRICULAR RATE, ECG03: 95 BPM
VT SETTING VENT: 550 ML
WBC # BLD AUTO: 11.7 K/UL (ref 3.6–11)
WBC # BLD AUTO: 11.8 K/UL (ref 3.6–11)
WBC # BLD AUTO: 12.3 K/UL (ref 3.6–11)
WBC # BLD AUTO: 12.6 K/UL (ref 3.6–11)
WBC # BLD AUTO: 13.4 K/UL (ref 3.6–11)
WBC # BLD AUTO: 13.7 K/UL (ref 3.6–11)
WBC # BLD AUTO: 13.8 K/UL (ref 3.6–11)
WBC # BLD AUTO: 13.9 K/UL (ref 3.6–11)
WBC # BLD AUTO: 14 K/UL (ref 3.6–11)
WBC # BLD AUTO: 14.5 K/UL (ref 3.6–11)
WBC # BLD AUTO: 20.3 K/UL (ref 3.6–11)
WBC # BLD AUTO: 21.1 K/UL (ref 3.6–11)
WBC # BLD AUTO: 23.3 K/UL (ref 3.6–11)
WBC # BLD AUTO: 26.4 K/UL (ref 3.6–11)
WBC # BLD AUTO: 30.9 K/UL (ref 3.6–11)
WBC # BLD AUTO: 39.9 K/UL (ref 3.6–11)

## 2021-01-01 PROCEDURE — 74011250636 HC RX REV CODE- 250/636: Performed by: EMERGENCY MEDICINE

## 2021-01-01 PROCEDURE — 80053 COMPREHEN METABOLIC PANEL: CPT

## 2021-01-01 PROCEDURE — 84145 PROCALCITONIN (PCT): CPT

## 2021-01-01 PROCEDURE — 86140 C-REACTIVE PROTEIN: CPT

## 2021-01-01 PROCEDURE — 82962 GLUCOSE BLOOD TEST: CPT

## 2021-01-01 PROCEDURE — 74011250636 HC RX REV CODE- 250/636: Performed by: INTERNAL MEDICINE

## 2021-01-01 PROCEDURE — 80069 RENAL FUNCTION PANEL: CPT

## 2021-01-01 PROCEDURE — 77010033678 HC OXYGEN DAILY

## 2021-01-01 PROCEDURE — 65270000029 HC RM PRIVATE

## 2021-01-01 PROCEDURE — 87040 BLOOD CULTURE FOR BACTERIA: CPT

## 2021-01-01 PROCEDURE — 99233 SBSQ HOSP IP/OBS HIGH 50: CPT | Performed by: INTERNAL MEDICINE

## 2021-01-01 PROCEDURE — 94002 VENT MGMT INPAT INIT DAY: CPT

## 2021-01-01 PROCEDURE — 74011250637 HC RX REV CODE- 250/637: Performed by: INTERNAL MEDICINE

## 2021-01-01 PROCEDURE — 82550 ASSAY OF CK (CPK): CPT

## 2021-01-01 PROCEDURE — 93312 ECHO TRANSESOPHAGEAL: CPT

## 2021-01-01 PROCEDURE — 97161 PT EVAL LOW COMPLEX 20 MIN: CPT

## 2021-01-01 PROCEDURE — 71045 X-RAY EXAM CHEST 1 VIEW: CPT

## 2021-01-01 PROCEDURE — 85025 COMPLETE CBC W/AUTO DIFF WBC: CPT

## 2021-01-01 PROCEDURE — 74011000258 HC RX REV CODE- 258: Performed by: INTERNAL MEDICINE

## 2021-01-01 PROCEDURE — 74018 RADEX ABDOMEN 1 VIEW: CPT

## 2021-01-01 PROCEDURE — 94640 AIRWAY INHALATION TREATMENT: CPT

## 2021-01-01 PROCEDURE — 74011636637 HC RX REV CODE- 636/637: Performed by: INTERNAL MEDICINE

## 2021-01-01 PROCEDURE — 36415 COLL VENOUS BLD VENIPUNCTURE: CPT

## 2021-01-01 PROCEDURE — 94660 CPAP INITIATION&MGMT: CPT

## 2021-01-01 PROCEDURE — 80048 BASIC METABOLIC PNL TOTAL CA: CPT

## 2021-01-01 PROCEDURE — 87147 CULTURE TYPE IMMUNOLOGIC: CPT

## 2021-01-01 PROCEDURE — 96372 THER/PROPH/DIAG INJ SC/IM: CPT

## 2021-01-01 PROCEDURE — 99223 1ST HOSP IP/OBS HIGH 75: CPT | Performed by: INTERNAL MEDICINE

## 2021-01-01 PROCEDURE — 90935 HEMODIALYSIS ONE EVALUATION: CPT

## 2021-01-01 PROCEDURE — 99284 EMERGENCY DEPT VISIT MOD MDM: CPT

## 2021-01-01 PROCEDURE — 65610000006 HC RM INTENSIVE CARE

## 2021-01-01 PROCEDURE — 83735 ASSAY OF MAGNESIUM: CPT

## 2021-01-01 PROCEDURE — 93005 ELECTROCARDIOGRAM TRACING: CPT

## 2021-01-01 PROCEDURE — 82803 BLOOD GASES ANY COMBINATION: CPT

## 2021-01-01 PROCEDURE — 99232 SBSQ HOSP IP/OBS MODERATE 35: CPT | Performed by: INTERNAL MEDICINE

## 2021-01-01 PROCEDURE — 96375 TX/PRO/DX INJ NEW DRUG ADDON: CPT

## 2021-01-01 PROCEDURE — 93306 TTE W/DOPPLER COMPLETE: CPT

## 2021-01-01 PROCEDURE — 87077 CULTURE AEROBIC IDENTIFY: CPT

## 2021-01-01 PROCEDURE — 74011000250 HC RX REV CODE- 250: Performed by: INTERNAL MEDICINE

## 2021-01-01 PROCEDURE — 36600 WITHDRAWAL OF ARTERIAL BLOOD: CPT

## 2021-01-01 PROCEDURE — 5A1D70Z PERFORMANCE OF URINARY FILTRATION, INTERMITTENT, LESS THAN 6 HOURS PER DAY: ICD-10-PCS | Performed by: INTERNAL MEDICINE

## 2021-01-01 PROCEDURE — 71250 CT THORAX DX C-: CPT

## 2021-01-01 PROCEDURE — 0BH17EZ INSERTION OF ENDOTRACHEAL AIRWAY INTO TRACHEA, VIA NATURAL OR ARTIFICIAL OPENING: ICD-10-PCS | Performed by: INTERNAL MEDICINE

## 2021-01-01 PROCEDURE — 97165 OT EVAL LOW COMPLEX 30 MIN: CPT

## 2021-01-01 PROCEDURE — 74011000250 HC RX REV CODE- 250: Performed by: NURSE ANESTHETIST, CERTIFIED REGISTERED

## 2021-01-01 PROCEDURE — 87186 SC STD MICRODIL/AGAR DIL: CPT

## 2021-01-01 PROCEDURE — 86901 BLOOD TYPING SEROLOGIC RH(D): CPT

## 2021-01-01 PROCEDURE — 85027 COMPLETE CBC AUTOMATED: CPT

## 2021-01-01 PROCEDURE — 80202 ASSAY OF VANCOMYCIN: CPT

## 2021-01-01 PROCEDURE — 94668 MNPJ CHEST WALL SBSQ: CPT

## 2021-01-01 PROCEDURE — 05HP33Z INSERTION OF INFUSION DEVICE INTO RIGHT EXTERNAL JUGULAR VEIN, PERCUTANEOUS APPROACH: ICD-10-PCS | Performed by: RADIOLOGY

## 2021-01-01 PROCEDURE — 36430 TRANSFUSION BLD/BLD COMPNT: CPT

## 2021-01-01 PROCEDURE — 74011000258 HC RX REV CODE- 258: Performed by: NURSE ANESTHETIST, CERTIFIED REGISTERED

## 2021-01-01 PROCEDURE — 5A1935Z RESPIRATORY VENTILATION, LESS THAN 24 CONSECUTIVE HOURS: ICD-10-PCS | Performed by: INTERNAL MEDICINE

## 2021-01-01 PROCEDURE — 96365 THER/PROPH/DIAG IV INF INIT: CPT

## 2021-01-01 PROCEDURE — 97530 THERAPEUTIC ACTIVITIES: CPT

## 2021-01-01 PROCEDURE — 92950 HEART/LUNG RESUSCITATION CPR: CPT

## 2021-01-01 PROCEDURE — 94760 N-INVAS EAR/PLS OXIMETRY 1: CPT

## 2021-01-01 PROCEDURE — 74011000258 HC RX REV CODE- 258: Performed by: EMERGENCY MEDICINE

## 2021-01-01 PROCEDURE — 74011250637 HC RX REV CODE- 250/637

## 2021-01-01 PROCEDURE — 76937 US GUIDE VASCULAR ACCESS: CPT

## 2021-01-01 PROCEDURE — 96366 THER/PROPH/DIAG IV INF ADDON: CPT

## 2021-01-01 PROCEDURE — 74011250637 HC RX REV CODE- 250/637: Performed by: PHYSICIAN ASSISTANT

## 2021-01-01 PROCEDURE — 74011250636 HC RX REV CODE- 250/636: Performed by: NURSE ANESTHETIST, CERTIFIED REGISTERED

## 2021-01-01 PROCEDURE — 83605 ASSAY OF LACTIC ACID: CPT

## 2021-01-01 PROCEDURE — 83880 ASSAY OF NATRIURETIC PEPTIDE: CPT

## 2021-01-01 PROCEDURE — P9016 RBC LEUKOCYTES REDUCED: HCPCS

## 2021-01-01 PROCEDURE — C1769 GUIDE WIRE: HCPCS

## 2021-01-01 PROCEDURE — 77001 FLUOROGUIDE FOR VEIN DEVICE: CPT

## 2021-01-01 PROCEDURE — 83036 HEMOGLOBIN GLYCOSYLATED A1C: CPT

## 2021-01-01 PROCEDURE — 94667 MNPJ CHEST WALL 1ST: CPT | Performed by: INTERNAL MEDICINE

## 2021-01-01 PROCEDURE — 87635 SARS-COV-2 COVID-19 AMP PRB: CPT

## 2021-01-01 PROCEDURE — 30233N1 TRANSFUSION OF NONAUTOLOGOUS RED BLOOD CELLS INTO PERIPHERAL VEIN, PERCUTANEOUS APPROACH: ICD-10-PCS | Performed by: INTERNAL MEDICINE

## 2021-01-01 PROCEDURE — 84484 ASSAY OF TROPONIN QUANT: CPT

## 2021-01-01 PROCEDURE — 86923 COMPATIBILITY TEST ELECTRIC: CPT

## 2021-01-01 RX ORDER — ACETAMINOPHEN 650 MG/1
650 SUPPOSITORY RECTAL
Status: DISCONTINUED | OUTPATIENT
Start: 2021-01-01 | End: 2021-01-01 | Stop reason: HOSPADM

## 2021-01-01 RX ORDER — EPINEPHRINE 0.1 MG/ML
INJECTION INTRACARDIAC; INTRAVENOUS
Status: COMPLETED | OUTPATIENT
Start: 2021-01-01 | End: 2021-01-01

## 2021-01-01 RX ORDER — SODIUM BICARBONATE 1 MEQ/ML
SYRINGE (ML) INTRAVENOUS
Status: DISCONTINUED
Start: 2021-01-01 | End: 2021-01-01 | Stop reason: HOSPADM

## 2021-01-01 RX ORDER — CLOPIDOGREL BISULFATE 75 MG/1
75 TABLET ORAL DAILY
Status: DISCONTINUED | OUTPATIENT
Start: 2021-01-01 | End: 2021-01-01 | Stop reason: HOSPADM

## 2021-01-01 RX ORDER — INSULIN GLARGINE 100 [IU]/ML
6 INJECTION, SOLUTION SUBCUTANEOUS
Status: DISCONTINUED | OUTPATIENT
Start: 2021-01-01 | End: 2021-01-01

## 2021-01-01 RX ORDER — POLYETHYLENE GLYCOL 3350 17 G/17G
17 POWDER, FOR SOLUTION ORAL DAILY PRN
Status: DISCONTINUED | OUTPATIENT
Start: 2021-01-01 | End: 2021-01-01 | Stop reason: HOSPADM

## 2021-01-01 RX ORDER — METOPROLOL TARTRATE 5 MG/5ML
2.5 INJECTION INTRAVENOUS
Status: CANCELLED | OUTPATIENT
Start: 2021-01-01

## 2021-01-01 RX ORDER — ACETAMINOPHEN 325 MG/1
650 TABLET ORAL
Status: DISCONTINUED | OUTPATIENT
Start: 2021-01-01 | End: 2021-01-01 | Stop reason: HOSPADM

## 2021-01-01 RX ORDER — PROPOFOL 10 MG/ML
INJECTION, EMULSION INTRAVENOUS AS NEEDED
Status: DISCONTINUED | OUTPATIENT
Start: 2021-01-01 | End: 2021-01-01 | Stop reason: HOSPADM

## 2021-01-01 RX ORDER — PROMETHAZINE HYDROCHLORIDE 25 MG/1
12.5 TABLET ORAL
Status: DISCONTINUED | OUTPATIENT
Start: 2021-01-01 | End: 2021-01-01 | Stop reason: SDUPTHER

## 2021-01-01 RX ORDER — HYDRALAZINE HYDROCHLORIDE 25 MG/1
25 TABLET, FILM COATED ORAL 3 TIMES DAILY
Status: DISCONTINUED | OUTPATIENT
Start: 2021-01-01 | End: 2021-01-01

## 2021-01-01 RX ORDER — METOCLOPRAMIDE HYDROCHLORIDE 5 MG/ML
10 INJECTION INTRAMUSCULAR; INTRAVENOUS
Status: DISCONTINUED | OUTPATIENT
Start: 2021-01-01 | End: 2021-01-01 | Stop reason: HOSPADM

## 2021-01-01 RX ORDER — SODIUM CHLORIDE 9 MG/ML
INJECTION, SOLUTION INTRAVENOUS
Status: DISCONTINUED | OUTPATIENT
Start: 2021-01-01 | End: 2021-01-01 | Stop reason: HOSPADM

## 2021-01-01 RX ORDER — INSULIN LISPRO 100 [IU]/ML
INJECTION, SOLUTION INTRAVENOUS; SUBCUTANEOUS
Status: DISCONTINUED | OUTPATIENT
Start: 2021-01-01 | End: 2021-01-01 | Stop reason: HOSPADM

## 2021-01-01 RX ORDER — ASPIRIN 81 MG/1
81 TABLET ORAL DAILY
Status: DISCONTINUED | OUTPATIENT
Start: 2021-01-01 | End: 2021-01-01 | Stop reason: HOSPADM

## 2021-01-01 RX ORDER — GUAIFENESIN 600 MG/1
1200 TABLET, EXTENDED RELEASE ORAL EVERY 12 HOURS
Status: DISCONTINUED | OUTPATIENT
Start: 2021-01-01 | End: 2021-01-01 | Stop reason: HOSPADM

## 2021-01-01 RX ORDER — INSULIN LISPRO 100 [IU]/ML
4 INJECTION, SOLUTION INTRAVENOUS; SUBCUTANEOUS
Status: DISCONTINUED | OUTPATIENT
Start: 2021-01-01 | End: 2021-01-01

## 2021-01-01 RX ORDER — AMLODIPINE BESYLATE AND BENAZEPRIL HYDROCHLORIDE 2.5; 1 MG/1; MG/1
4 CAPSULE ORAL DAILY
Status: DISCONTINUED | OUTPATIENT
Start: 2021-01-01 | End: 2021-01-01

## 2021-01-01 RX ORDER — DIPHENHYDRAMINE HYDROCHLORIDE 50 MG/ML
12.5 INJECTION, SOLUTION INTRAMUSCULAR; INTRAVENOUS AS NEEDED
Status: CANCELLED | OUTPATIENT
Start: 2021-01-01 | End: 2021-01-01

## 2021-01-01 RX ORDER — ETOMIDATE 2 MG/ML
INJECTION INTRAVENOUS AS NEEDED
Status: DISCONTINUED | OUTPATIENT
Start: 2021-01-01 | End: 2021-01-01 | Stop reason: HOSPADM

## 2021-01-01 RX ORDER — DEXTROSE 50 % IN WATER (D50W) INTRAVENOUS SYRINGE
25-50 AS NEEDED
Status: DISCONTINUED | OUTPATIENT
Start: 2021-01-01 | End: 2021-01-01 | Stop reason: HOSPADM

## 2021-01-01 RX ORDER — SODIUM CHLORIDE 0.9 % (FLUSH) 0.9 %
5-40 SYRINGE (ML) INJECTION AS NEEDED
Status: DISCONTINUED | OUTPATIENT
Start: 2021-01-01 | End: 2021-01-01 | Stop reason: HOSPADM

## 2021-01-01 RX ORDER — SODIUM CHLORIDE 0.9 % (FLUSH) 0.9 %
5-40 SYRINGE (ML) INJECTION EVERY 8 HOURS
Status: CANCELLED | OUTPATIENT
Start: 2021-01-01

## 2021-01-01 RX ORDER — EPHEDRINE SULFATE/0.9% NACL/PF 50 MG/5 ML
5 SYRINGE (ML) INTRAVENOUS AS NEEDED
Status: CANCELLED | OUTPATIENT
Start: 2021-01-01

## 2021-01-01 RX ORDER — SODIUM CHLORIDE 0.9 % (FLUSH) 0.9 %
5-40 SYRINGE (ML) INJECTION EVERY 8 HOURS
Status: DISCONTINUED | OUTPATIENT
Start: 2021-01-01 | End: 2021-01-01 | Stop reason: HOSPADM

## 2021-01-01 RX ORDER — LIDOCAINE HYDROCHLORIDE 10 MG/ML
0.1 INJECTION, SOLUTION EPIDURAL; INFILTRATION; INTRACAUDAL; PERINEURAL AS NEEDED
Status: CANCELLED | OUTPATIENT
Start: 2021-01-01

## 2021-01-01 RX ORDER — ATORVASTATIN CALCIUM 10 MG/1
20 TABLET, FILM COATED ORAL
Status: DISCONTINUED | OUTPATIENT
Start: 2021-01-01 | End: 2021-01-01

## 2021-01-01 RX ORDER — BENZONATATE 100 MG/1
100 CAPSULE ORAL
Status: DISCONTINUED | OUTPATIENT
Start: 2021-01-01 | End: 2021-01-01 | Stop reason: HOSPADM

## 2021-01-01 RX ORDER — MIDAZOLAM HYDROCHLORIDE 1 MG/ML
1 INJECTION, SOLUTION INTRAMUSCULAR; INTRAVENOUS AS NEEDED
Status: CANCELLED | OUTPATIENT
Start: 2021-01-01

## 2021-01-01 RX ORDER — ACETYLCYSTEINE 200 MG/ML
400 SOLUTION ORAL; RESPIRATORY (INHALATION)
Status: DISCONTINUED | OUTPATIENT
Start: 2021-01-01 | End: 2021-01-01 | Stop reason: HOSPADM

## 2021-01-01 RX ORDER — SODIUM BICARBONATE 1 MEQ/ML
SYRINGE (ML) INTRAVENOUS
Status: COMPLETED | OUTPATIENT
Start: 2021-01-01 | End: 2021-01-01

## 2021-01-01 RX ORDER — HYDROMORPHONE HYDROCHLORIDE 1 MG/ML
0.4 INJECTION, SOLUTION INTRAMUSCULAR; INTRAVENOUS; SUBCUTANEOUS
Status: CANCELLED | OUTPATIENT
Start: 2021-01-01

## 2021-01-01 RX ORDER — ACETAMINOPHEN 325 MG/1
TABLET ORAL
Status: COMPLETED
Start: 2021-01-01 | End: 2021-01-01

## 2021-01-01 RX ORDER — FUROSEMIDE 10 MG/ML
80 INJECTION INTRAMUSCULAR; INTRAVENOUS
Status: COMPLETED | OUTPATIENT
Start: 2021-01-01 | End: 2021-01-01

## 2021-01-01 RX ORDER — HYDROXYZINE PAMOATE 50 MG/1
50 CAPSULE ORAL
Status: DISCONTINUED | OUTPATIENT
Start: 2021-01-01 | End: 2021-01-01 | Stop reason: HOSPADM

## 2021-01-01 RX ORDER — HEPARIN SODIUM 5000 [USP'U]/ML
5000 INJECTION, SOLUTION INTRAVENOUS; SUBCUTANEOUS EVERY 12 HOURS
Status: DISCONTINUED | OUTPATIENT
Start: 2021-01-01 | End: 2021-01-01 | Stop reason: HOSPADM

## 2021-01-01 RX ORDER — MIDAZOLAM HYDROCHLORIDE 1 MG/ML
0.5 INJECTION, SOLUTION INTRAMUSCULAR; INTRAVENOUS
Status: CANCELLED | OUTPATIENT
Start: 2021-01-01

## 2021-01-01 RX ORDER — ATENOLOL 50 MG/1
50 TABLET ORAL DAILY
Status: DISCONTINUED | OUTPATIENT
Start: 2021-01-01 | End: 2021-01-01 | Stop reason: HOSPADM

## 2021-01-01 RX ORDER — MAGNESIUM SULFATE 100 %
4 CRYSTALS MISCELLANEOUS AS NEEDED
Status: DISCONTINUED | OUTPATIENT
Start: 2021-01-01 | End: 2021-01-01 | Stop reason: HOSPADM

## 2021-01-01 RX ORDER — FENTANYL CITRATE 50 UG/ML
50 INJECTION, SOLUTION INTRAMUSCULAR; INTRAVENOUS
Status: CANCELLED | OUTPATIENT
Start: 2021-01-01

## 2021-01-01 RX ORDER — SODIUM CHLORIDE 0.9 % (FLUSH) 0.9 %
5-40 SYRINGE (ML) INJECTION AS NEEDED
Status: CANCELLED | OUTPATIENT
Start: 2021-01-01

## 2021-01-01 RX ORDER — ONDANSETRON 2 MG/ML
4 INJECTION INTRAMUSCULAR; INTRAVENOUS
Status: DISCONTINUED | OUTPATIENT
Start: 2021-01-01 | End: 2021-01-01

## 2021-01-01 RX ORDER — ETOMIDATE 2 MG/ML
INJECTION INTRAVENOUS
Status: COMPLETED
Start: 2021-01-01 | End: 2021-01-01

## 2021-01-01 RX ORDER — LABETALOL HCL 20 MG/4 ML
5 SYRINGE (ML) INTRAVENOUS
Status: CANCELLED | OUTPATIENT
Start: 2021-01-01

## 2021-01-01 RX ORDER — AMIODARONE HYDROCHLORIDE 150 MG/3ML
INJECTION, SOLUTION INTRAVENOUS
Status: COMPLETED | OUTPATIENT
Start: 2021-01-01 | End: 2021-01-01

## 2021-01-01 RX ORDER — NOREPINEPHRINE BITARTRATE/D5W 8 MG/250ML
PLASTIC BAG, INJECTION (ML) INTRAVENOUS
Status: DISCONTINUED
Start: 2021-01-01 | End: 2021-01-01 | Stop reason: HOSPADM

## 2021-01-01 RX ORDER — INSULIN GLARGINE 100 [IU]/ML
10 INJECTION, SOLUTION SUBCUTANEOUS
Status: DISCONTINUED | OUTPATIENT
Start: 2021-01-01 | End: 2021-01-01

## 2021-01-01 RX ORDER — SODIUM CHLORIDE 0.9 % (FLUSH) 0.9 %
5-10 SYRINGE (ML) INJECTION AS NEEDED
Status: DISCONTINUED | OUTPATIENT
Start: 2021-01-01 | End: 2021-01-01 | Stop reason: HOSPADM

## 2021-01-01 RX ORDER — CHOLECALCIFEROL (VITAMIN D3) 125 MCG
5 CAPSULE ORAL
Status: DISCONTINUED | OUTPATIENT
Start: 2021-01-01 | End: 2021-01-01 | Stop reason: HOSPADM

## 2021-01-01 RX ORDER — ACETYLCYSTEINE 200 MG/ML
400 SOLUTION ORAL; RESPIRATORY (INHALATION)
Status: DISCONTINUED | OUTPATIENT
Start: 2021-01-01 | End: 2021-01-01

## 2021-01-01 RX ORDER — SODIUM POLYSTYRENE SULFONATE 15 G/60ML
30 SUSPENSION ORAL; RECTAL
Status: COMPLETED | OUTPATIENT
Start: 2021-01-01 | End: 2021-01-01

## 2021-01-01 RX ORDER — HYDROCODONE BITARTRATE AND ACETAMINOPHEN 5; 325 MG/1; MG/1
1 TABLET ORAL AS NEEDED
Status: CANCELLED | OUTPATIENT
Start: 2021-01-01

## 2021-01-01 RX ORDER — HYDRALAZINE HYDROCHLORIDE 20 MG/ML
10 INJECTION INTRAMUSCULAR; INTRAVENOUS
Status: CANCELLED | OUTPATIENT
Start: 2021-01-01

## 2021-01-01 RX ORDER — INSULIN LISPRO 100 [IU]/ML
4 INJECTION, SOLUTION INTRAVENOUS; SUBCUTANEOUS
Status: DISCONTINUED | OUTPATIENT
Start: 2021-01-01 | End: 2021-01-01 | Stop reason: HOSPADM

## 2021-01-01 RX ORDER — METRONIDAZOLE 500 MG/100ML
500 INJECTION, SOLUTION INTRAVENOUS EVERY 8 HOURS
Status: DISCONTINUED | OUTPATIENT
Start: 2021-01-01 | End: 2021-01-01

## 2021-01-01 RX ORDER — INSULIN GLARGINE 100 [IU]/ML
14 INJECTION, SOLUTION SUBCUTANEOUS
Status: DISCONTINUED | OUTPATIENT
Start: 2021-01-01 | End: 2021-01-01 | Stop reason: HOSPADM

## 2021-01-01 RX ORDER — DIPHENHYDRAMINE HCL 25 MG
25 CAPSULE ORAL ONCE
Status: COMPLETED | OUTPATIENT
Start: 2021-01-01 | End: 2021-01-01

## 2021-01-01 RX ORDER — FUROSEMIDE 10 MG/ML
80 INJECTION INTRAMUSCULAR; INTRAVENOUS ONCE
Status: COMPLETED | OUTPATIENT
Start: 2021-01-01 | End: 2021-01-01

## 2021-01-01 RX ORDER — POTASSIUM CHLORIDE 1.5 G/1.77G
40 POWDER, FOR SOLUTION ORAL 2 TIMES DAILY WITH MEALS
Status: DISCONTINUED | OUTPATIENT
Start: 2021-01-01 | End: 2021-01-01

## 2021-01-01 RX ORDER — LORATADINE 10 MG/1
10 TABLET ORAL DAILY
Status: DISCONTINUED | OUTPATIENT
Start: 2021-01-01 | End: 2021-01-01 | Stop reason: HOSPADM

## 2021-01-01 RX ORDER — IPRATROPIUM BROMIDE AND ALBUTEROL SULFATE 2.5; .5 MG/3ML; MG/3ML
3 SOLUTION RESPIRATORY (INHALATION)
Status: DISCONTINUED | OUTPATIENT
Start: 2021-01-01 | End: 2021-01-01 | Stop reason: HOSPADM

## 2021-01-01 RX ORDER — ONDANSETRON 2 MG/ML
4 INJECTION INTRAMUSCULAR; INTRAVENOUS AS NEEDED
Status: CANCELLED | OUTPATIENT
Start: 2021-01-01

## 2021-01-01 RX ORDER — POTASSIUM CHLORIDE 7.45 MG/ML
10 INJECTION INTRAVENOUS ONCE
Status: COMPLETED | OUTPATIENT
Start: 2021-01-01 | End: 2021-01-01

## 2021-01-01 RX ORDER — FENTANYL CITRATE 50 UG/ML
50 INJECTION, SOLUTION INTRAMUSCULAR; INTRAVENOUS AS NEEDED
Status: CANCELLED | OUTPATIENT
Start: 2021-01-01

## 2021-01-01 RX ORDER — ROCURONIUM BROMIDE 10 MG/ML
INJECTION, SOLUTION INTRAVENOUS
Status: DISCONTINUED
Start: 2021-01-01 | End: 2021-01-01 | Stop reason: HOSPADM

## 2021-01-01 RX ORDER — PROPOFOL 10 MG/ML
INJECTION, EMULSION INTRAVENOUS
Status: DISCONTINUED
Start: 2021-01-01 | End: 2021-01-01 | Stop reason: HOSPADM

## 2021-01-01 RX ADMIN — CLOPIDOGREL BISULFATE 75 MG: 75 TABLET ORAL at 08:59

## 2021-01-01 RX ADMIN — SODIUM CHLORIDE 200 MG: 900 INJECTION, SOLUTION INTRAVENOUS at 09:11

## 2021-01-01 RX ADMIN — Medication 10 ML: at 22:08

## 2021-01-01 RX ADMIN — EPINEPHRINE 1 MG: 0.1 INJECTION, SOLUTION ENDOTRACHEAL; INTRACARDIAC; INTRAVENOUS at 13:38

## 2021-01-01 RX ADMIN — HEPARIN SODIUM 5000 UNITS: 5000 INJECTION INTRAVENOUS; SUBCUTANEOUS at 19:05

## 2021-01-01 RX ADMIN — INSULIN LISPRO 4 UNITS: 100 INJECTION, SOLUTION INTRAVENOUS; SUBCUTANEOUS at 16:00

## 2021-01-01 RX ADMIN — AMLODIPINE AND BENAZEPRIL HYDROCHLORIDE 4 CAPSULE: 2.5; 1 CAPSULE ORAL at 10:09

## 2021-01-01 RX ADMIN — HEPARIN SODIUM 5000 UNITS: 5000 INJECTION INTRAVENOUS; SUBCUTANEOUS at 18:08

## 2021-01-01 RX ADMIN — IPRATROPIUM BROMIDE AND ALBUTEROL SULFATE 3 ML: .5; 3 SOLUTION RESPIRATORY (INHALATION) at 19:51

## 2021-01-01 RX ADMIN — SODIUM CHLORIDE 200 MG: 900 INJECTION, SOLUTION INTRAVENOUS at 21:37

## 2021-01-01 RX ADMIN — SODIUM CHLORIDE 200 MG: 900 INJECTION, SOLUTION INTRAVENOUS at 23:36

## 2021-01-01 RX ADMIN — METRONIDAZOLE 500 MG: 500 INJECTION, SOLUTION INTRAVENOUS at 15:36

## 2021-01-01 RX ADMIN — LORATADINE 10 MG: 10 TABLET ORAL at 08:55

## 2021-01-01 RX ADMIN — ACETAMINOPHEN 650 MG: 650 SUPPOSITORY RECTAL at 00:32

## 2021-01-01 RX ADMIN — INSULIN LISPRO 2 UNITS: 100 INJECTION, SOLUTION INTRAVENOUS; SUBCUTANEOUS at 23:15

## 2021-01-01 RX ADMIN — AMLODIPINE AND BENAZEPRIL HYDROCHLORIDE 4 CAPSULE: 2.5; 1 CAPSULE ORAL at 09:00

## 2021-01-01 RX ADMIN — ACETAMINOPHEN 650 MG: 325 TABLET, FILM COATED ORAL at 04:45

## 2021-01-01 RX ADMIN — ACETYLCYSTEINE 400 MG: 200 SOLUTION ORAL; RESPIRATORY (INHALATION) at 07:03

## 2021-01-01 RX ADMIN — ATENOLOL 50 MG: 50 TABLET ORAL at 08:24

## 2021-01-01 RX ADMIN — MULTIPLE VITAMINS W/ MINERALS TAB 1 TABLET: TAB at 13:38

## 2021-01-01 RX ADMIN — HEPARIN SODIUM 5000 UNITS: 5000 INJECTION INTRAVENOUS; SUBCUTANEOUS at 17:56

## 2021-01-01 RX ADMIN — BENZOCAINE, BUTAMBEN, AND TETRACAINE HYDROCHLORIDE 1 SPRAY: .028; .004; .004 AEROSOL, SPRAY TOPICAL at 12:49

## 2021-01-01 RX ADMIN — HYDRALAZINE HYDROCHLORIDE 25 MG: 25 TABLET, FILM COATED ORAL at 21:33

## 2021-01-01 RX ADMIN — MULTIPLE VITAMINS W/ MINERALS TAB 1 TABLET: TAB at 09:09

## 2021-01-01 RX ADMIN — INSULIN GLARGINE 6 UNITS: 100 INJECTION, SOLUTION SUBCUTANEOUS at 21:58

## 2021-01-01 RX ADMIN — VANCOMYCIN HYDROCHLORIDE 1250 MG: 1.25 INJECTION, POWDER, LYOPHILIZED, FOR SOLUTION INTRAVENOUS at 16:39

## 2021-01-01 RX ADMIN — HEPARIN SODIUM 5000 UNITS: 5000 INJECTION INTRAVENOUS; SUBCUTANEOUS at 06:30

## 2021-01-01 RX ADMIN — MULTIPLE VITAMINS W/ MINERALS TAB 1 TABLET: TAB at 15:22

## 2021-01-01 RX ADMIN — ONDANSETRON 4 MG: 2 INJECTION INTRAMUSCULAR; INTRAVENOUS at 08:15

## 2021-01-01 RX ADMIN — HYDRALAZINE HYDROCHLORIDE 25 MG: 25 TABLET, FILM COATED ORAL at 17:35

## 2021-01-01 RX ADMIN — MULTIPLE VITAMINS W/ MINERALS TAB 1 TABLET: TAB at 10:09

## 2021-01-01 RX ADMIN — ASPIRIN 81 MG: 81 TABLET, COATED ORAL at 09:09

## 2021-01-01 RX ADMIN — EPINEPHRINE 1 MG: 0.1 INJECTION, SOLUTION ENDOTRACHEAL; INTRACARDIAC; INTRAVENOUS at 13:29

## 2021-01-01 RX ADMIN — ONDANSETRON 4 MG: 2 INJECTION INTRAMUSCULAR; INTRAVENOUS at 17:26

## 2021-01-01 RX ADMIN — ACETYLCYSTEINE 400 MG: 200 SOLUTION ORAL; RESPIRATORY (INHALATION) at 19:51

## 2021-01-01 RX ADMIN — VANCOMYCIN HYDROCHLORIDE 500 MG: 500 INJECTION, POWDER, LYOPHILIZED, FOR SOLUTION INTRAVENOUS at 21:42

## 2021-01-01 RX ADMIN — ACETYLCYSTEINE 400 MG: 200 SOLUTION ORAL; RESPIRATORY (INHALATION) at 01:03

## 2021-01-01 RX ADMIN — HYDRALAZINE HYDROCHLORIDE 25 MG: 25 TABLET, FILM COATED ORAL at 21:59

## 2021-01-01 RX ADMIN — INSULIN LISPRO 4 UNITS: 100 INJECTION, SOLUTION INTRAVENOUS; SUBCUTANEOUS at 16:35

## 2021-01-01 RX ADMIN — SODIUM CHLORIDE 200 MG: 900 INJECTION, SOLUTION INTRAVENOUS at 18:46

## 2021-01-01 RX ADMIN — INSULIN LISPRO 5 UNITS: 100 INJECTION, SOLUTION INTRAVENOUS; SUBCUTANEOUS at 12:20

## 2021-01-01 RX ADMIN — PROMETHAZINE HYDROCHLORIDE 12.5 MG: 25 TABLET ORAL at 21:34

## 2021-01-01 RX ADMIN — ASPIRIN 81 MG: 81 TABLET, COATED ORAL at 09:28

## 2021-01-01 RX ADMIN — ATENOLOL 50 MG: 50 TABLET ORAL at 15:22

## 2021-01-01 RX ADMIN — INSULIN LISPRO 2 UNITS: 100 INJECTION, SOLUTION INTRAVENOUS; SUBCUTANEOUS at 18:10

## 2021-01-01 RX ADMIN — POTASSIUM CHLORIDE 10 MEQ: 7.46 INJECTION, SOLUTION INTRAVENOUS at 10:34

## 2021-01-01 RX ADMIN — HEPARIN SODIUM 5000 UNITS: 5000 INJECTION INTRAVENOUS; SUBCUTANEOUS at 06:17

## 2021-01-01 RX ADMIN — IPRATROPIUM BROMIDE AND ALBUTEROL SULFATE 3 ML: .5; 3 SOLUTION RESPIRATORY (INHALATION) at 20:42

## 2021-01-01 RX ADMIN — LORATADINE 10 MG: 10 TABLET ORAL at 08:24

## 2021-01-01 RX ADMIN — ACETAMINOPHEN 650 MG: 325 TABLET, FILM COATED ORAL at 17:25

## 2021-01-01 RX ADMIN — Medication 10 ML: at 14:00

## 2021-01-01 RX ADMIN — INSULIN LISPRO 3 UNITS: 100 INJECTION, SOLUTION INTRAVENOUS; SUBCUTANEOUS at 08:53

## 2021-01-01 RX ADMIN — MULTIPLE VITAMINS W/ MINERALS TAB 1 TABLET: TAB at 09:45

## 2021-01-01 RX ADMIN — ACETAMINOPHEN 650 MG: 325 TABLET, FILM COATED ORAL at 15:55

## 2021-01-01 RX ADMIN — ACETYLCYSTEINE 400 MG: 200 SOLUTION ORAL; RESPIRATORY (INHALATION) at 19:25

## 2021-01-01 RX ADMIN — INSULIN LISPRO 2 UNITS: 100 INJECTION, SOLUTION INTRAVENOUS; SUBCUTANEOUS at 17:28

## 2021-01-01 RX ADMIN — INSULIN LISPRO 3 UNITS: 100 INJECTION, SOLUTION INTRAVENOUS; SUBCUTANEOUS at 18:07

## 2021-01-01 RX ADMIN — ATORVASTATIN CALCIUM 20 MG: 10 TABLET, FILM COATED ORAL at 00:21

## 2021-01-01 RX ADMIN — ASPIRIN 81 MG: 81 TABLET, COATED ORAL at 12:35

## 2021-01-01 RX ADMIN — INSULIN LISPRO 3 UNITS: 100 INJECTION, SOLUTION INTRAVENOUS; SUBCUTANEOUS at 11:23

## 2021-01-01 RX ADMIN — INSULIN LISPRO 4 UNITS: 100 INJECTION, SOLUTION INTRAVENOUS; SUBCUTANEOUS at 18:06

## 2021-01-01 RX ADMIN — Medication 10 ML: at 06:01

## 2021-01-01 RX ADMIN — METRONIDAZOLE 500 MG: 500 INJECTION, SOLUTION INTRAVENOUS at 21:02

## 2021-01-01 RX ADMIN — HEPARIN SODIUM 5000 UNITS: 5000 INJECTION INTRAVENOUS; SUBCUTANEOUS at 18:46

## 2021-01-01 RX ADMIN — INSULIN LISPRO 2 UNITS: 100 INJECTION, SOLUTION INTRAVENOUS; SUBCUTANEOUS at 12:55

## 2021-01-01 RX ADMIN — METRONIDAZOLE 500 MG: 500 INJECTION, SOLUTION INTRAVENOUS at 21:12

## 2021-01-01 RX ADMIN — POTASSIUM CHLORIDE 40 MEQ: 1.5 FOR SOLUTION ORAL at 09:44

## 2021-01-01 RX ADMIN — ATENOLOL 50 MG: 50 TABLET ORAL at 13:38

## 2021-01-01 RX ADMIN — INSULIN LISPRO 7 UNITS: 100 INJECTION, SOLUTION INTRAVENOUS; SUBCUTANEOUS at 11:30

## 2021-01-01 RX ADMIN — ATENOLOL 50 MG: 50 TABLET ORAL at 08:54

## 2021-01-01 RX ADMIN — Medication 10 ML: at 05:45

## 2021-01-01 RX ADMIN — LORATADINE 10 MG: 10 TABLET ORAL at 15:45

## 2021-01-01 RX ADMIN — INSULIN LISPRO 4 UNITS: 100 INJECTION, SOLUTION INTRAVENOUS; SUBCUTANEOUS at 18:10

## 2021-01-01 RX ADMIN — IPRATROPIUM BROMIDE AND ALBUTEROL SULFATE 3 ML: .5; 3 SOLUTION RESPIRATORY (INHALATION) at 13:21

## 2021-01-01 RX ADMIN — Medication 5 ML: at 05:40

## 2021-01-01 RX ADMIN — CLOPIDOGREL BISULFATE 75 MG: 75 TABLET ORAL at 09:45

## 2021-01-01 RX ADMIN — HYDRALAZINE HYDROCHLORIDE 25 MG: 25 TABLET, FILM COATED ORAL at 16:35

## 2021-01-01 RX ADMIN — INSULIN LISPRO 4 UNITS: 100 INJECTION, SOLUTION INTRAVENOUS; SUBCUTANEOUS at 17:57

## 2021-01-01 RX ADMIN — INSULIN LISPRO 4 UNITS: 100 INJECTION, SOLUTION INTRAVENOUS; SUBCUTANEOUS at 10:09

## 2021-01-01 RX ADMIN — INSULIN LISPRO 3 UNITS: 100 INJECTION, SOLUTION INTRAVENOUS; SUBCUTANEOUS at 09:44

## 2021-01-01 RX ADMIN — MULTIPLE VITAMINS W/ MINERALS TAB 1 TABLET: TAB at 08:24

## 2021-01-01 RX ADMIN — MULTIPLE VITAMINS W/ MINERALS TAB 1 TABLET: TAB at 09:28

## 2021-01-01 RX ADMIN — METRONIDAZOLE 500 MG: 500 INJECTION, SOLUTION INTRAVENOUS at 19:44

## 2021-01-01 RX ADMIN — SODIUM CHLORIDE 200 MG: 900 INJECTION, SOLUTION INTRAVENOUS at 20:35

## 2021-01-01 RX ADMIN — CLOPIDOGREL BISULFATE 75 MG: 75 TABLET ORAL at 09:09

## 2021-01-01 RX ADMIN — METRONIDAZOLE 500 MG: 500 INJECTION, SOLUTION INTRAVENOUS at 05:45

## 2021-01-01 RX ADMIN — Medication 10 ML: at 06:00

## 2021-01-01 RX ADMIN — ACETAMINOPHEN 650 MG: 325 TABLET, FILM COATED ORAL at 15:54

## 2021-01-01 RX ADMIN — HEPARIN SODIUM 5000 UNITS: 5000 INJECTION INTRAVENOUS; SUBCUTANEOUS at 05:10

## 2021-01-01 RX ADMIN — ATORVASTATIN CALCIUM 20 MG: 10 TABLET, FILM COATED ORAL at 21:58

## 2021-01-01 RX ADMIN — MULTIPLE VITAMINS W/ MINERALS TAB 1 TABLET: TAB at 08:55

## 2021-01-01 RX ADMIN — SODIUM CHLORIDE 200 MG: 900 INJECTION, SOLUTION INTRAVENOUS at 21:58

## 2021-01-01 RX ADMIN — GUAIFENESIN 1200 MG: 600 TABLET, EXTENDED RELEASE ORAL at 13:44

## 2021-01-01 RX ADMIN — POTASSIUM CHLORIDE 40 MEQ: 1.5 FOR SOLUTION ORAL at 17:11

## 2021-01-01 RX ADMIN — EPINEPHRINE 1 MG: 0.1 INJECTION, SOLUTION ENDOTRACHEAL; INTRACARDIAC; INTRAVENOUS at 13:26

## 2021-01-01 RX ADMIN — INSULIN GLARGINE 6 UNITS: 100 INJECTION, SOLUTION SUBCUTANEOUS at 22:00

## 2021-01-01 RX ADMIN — ATORVASTATIN CALCIUM 20 MG: 10 TABLET, FILM COATED ORAL at 21:59

## 2021-01-01 RX ADMIN — Medication 10 ML: at 22:00

## 2021-01-01 RX ADMIN — ETOMIDATE 5 MG: 2 INJECTION INTRAVENOUS at 13:01

## 2021-01-01 RX ADMIN — PROPOFOL 25 MG: 10 INJECTION, EMULSION INTRAVENOUS at 13:01

## 2021-01-01 RX ADMIN — Medication 10 ML: at 13:15

## 2021-01-01 RX ADMIN — VANCOMYCIN HYDROCHLORIDE 1000 MG: 1 INJECTION, POWDER, LYOPHILIZED, FOR SOLUTION INTRAVENOUS at 13:16

## 2021-01-01 RX ADMIN — PROMETHAZINE HYDROCHLORIDE 12.5 MG: 25 TABLET ORAL at 10:50

## 2021-01-01 RX ADMIN — ACETAMINOPHEN 650 MG: 325 TABLET, FILM COATED ORAL at 21:11

## 2021-01-01 RX ADMIN — MULTIPLE VITAMINS W/ MINERALS TAB 1 TABLET: TAB at 12:35

## 2021-01-01 RX ADMIN — Medication 10 ML: at 21:53

## 2021-01-01 RX ADMIN — ASPIRIN 81 MG: 81 TABLET, COATED ORAL at 10:09

## 2021-01-01 RX ADMIN — HEPARIN SODIUM 5000 UNITS: 5000 INJECTION INTRAVENOUS; SUBCUTANEOUS at 21:58

## 2021-01-01 RX ADMIN — ACETAMINOPHEN 650 MG: 325 TABLET, FILM COATED ORAL at 14:02

## 2021-01-01 RX ADMIN — LORATADINE 10 MG: 10 TABLET ORAL at 15:22

## 2021-01-01 RX ADMIN — INSULIN LISPRO 4 UNITS: 100 INJECTION, SOLUTION INTRAVENOUS; SUBCUTANEOUS at 16:30

## 2021-01-01 RX ADMIN — INSULIN GLARGINE 6 UNITS: 100 INJECTION, SOLUTION SUBCUTANEOUS at 23:15

## 2021-01-01 RX ADMIN — SODIUM CHLORIDE 200 MG: 900 INJECTION, SOLUTION INTRAVENOUS at 12:09

## 2021-01-01 RX ADMIN — HYDRALAZINE HYDROCHLORIDE 25 MG: 25 TABLET, FILM COATED ORAL at 08:07

## 2021-01-01 RX ADMIN — HYDRALAZINE HYDROCHLORIDE 25 MG: 25 TABLET, FILM COATED ORAL at 23:15

## 2021-01-01 RX ADMIN — INSULIN LISPRO 4 UNITS: 100 INJECTION, SOLUTION INTRAVENOUS; SUBCUTANEOUS at 17:30

## 2021-01-01 RX ADMIN — INSULIN LISPRO 5 UNITS: 100 INJECTION, SOLUTION INTRAVENOUS; SUBCUTANEOUS at 12:21

## 2021-01-01 RX ADMIN — IPRATROPIUM BROMIDE AND ALBUTEROL SULFATE 3 ML: .5; 3 SOLUTION RESPIRATORY (INHALATION) at 07:03

## 2021-01-01 RX ADMIN — AMLODIPINE AND BENAZEPRIL HYDROCHLORIDE 4 CAPSULE: 2.5; 1 CAPSULE ORAL at 12:00

## 2021-01-01 RX ADMIN — EPINEPHRINE 1 MG: 0.1 INJECTION, SOLUTION ENDOTRACHEAL; INTRACARDIAC; INTRAVENOUS at 13:35

## 2021-01-01 RX ADMIN — INSULIN LISPRO 7 UNITS: 100 INJECTION, SOLUTION INTRAVENOUS; SUBCUTANEOUS at 10:09

## 2021-01-01 RX ADMIN — Medication 10 ML: at 06:23

## 2021-01-01 RX ADMIN — ATENOLOL 50 MG: 50 TABLET ORAL at 15:45

## 2021-01-01 RX ADMIN — INSULIN LISPRO 2 UNITS: 100 INJECTION, SOLUTION INTRAVENOUS; SUBCUTANEOUS at 17:00

## 2021-01-01 RX ADMIN — HEPARIN SODIUM 5000 UNITS: 5000 INJECTION INTRAVENOUS; SUBCUTANEOUS at 05:47

## 2021-01-01 RX ADMIN — Medication 10 ML: at 21:33

## 2021-01-01 RX ADMIN — PIPERACILLIN AND TAZOBACTAM 3.38 G: 3; .375 INJECTION, POWDER, LYOPHILIZED, FOR SOLUTION INTRAVENOUS at 09:30

## 2021-01-01 RX ADMIN — HYDRALAZINE HYDROCHLORIDE 25 MG: 25 TABLET, FILM COATED ORAL at 11:23

## 2021-01-01 RX ADMIN — IPRATROPIUM BROMIDE AND ALBUTEROL SULFATE 3 ML: .5; 3 SOLUTION RESPIRATORY (INHALATION) at 06:48

## 2021-01-01 RX ADMIN — MULTIPLE VITAMINS W/ MINERALS TAB 1 TABLET: TAB at 15:45

## 2021-01-01 RX ADMIN — PROPOFOL 25 MG: 10 INJECTION, EMULSION INTRAVENOUS at 12:58

## 2021-01-01 RX ADMIN — INSULIN LISPRO 2 UNITS: 100 INJECTION, SOLUTION INTRAVENOUS; SUBCUTANEOUS at 21:58

## 2021-01-01 RX ADMIN — ACETAMINOPHEN 650 MG: 325 TABLET, FILM COATED ORAL at 00:14

## 2021-01-01 RX ADMIN — POLYETHYLENE GLYCOL 3350 17 G: 17 POWDER, FOR SOLUTION ORAL at 17:30

## 2021-01-01 RX ADMIN — INSULIN LISPRO 3 UNITS: 100 INJECTION, SOLUTION INTRAVENOUS; SUBCUTANEOUS at 15:39

## 2021-01-01 RX ADMIN — ONDANSETRON 4 MG: 2 INJECTION INTRAMUSCULAR; INTRAVENOUS at 21:33

## 2021-01-01 RX ADMIN — INSULIN LISPRO 5 UNITS: 100 INJECTION, SOLUTION INTRAVENOUS; SUBCUTANEOUS at 08:08

## 2021-01-01 RX ADMIN — SODIUM CHLORIDE 200 MG: 900 INJECTION, SOLUTION INTRAVENOUS at 15:34

## 2021-01-01 RX ADMIN — INSULIN LISPRO 4 UNITS: 100 INJECTION, SOLUTION INTRAVENOUS; SUBCUTANEOUS at 12:55

## 2021-01-01 RX ADMIN — HEPARIN SODIUM 5000 UNITS: 5000 INJECTION INTRAVENOUS; SUBCUTANEOUS at 06:05

## 2021-01-01 RX ADMIN — INSULIN LISPRO 4 UNITS: 100 INJECTION, SOLUTION INTRAVENOUS; SUBCUTANEOUS at 15:38

## 2021-01-01 RX ADMIN — HEPARIN SODIUM 5000 UNITS: 5000 INJECTION INTRAVENOUS; SUBCUTANEOUS at 18:30

## 2021-01-01 RX ADMIN — HEPARIN SODIUM 5000 UNITS: 5000 INJECTION INTRAVENOUS; SUBCUTANEOUS at 17:55

## 2021-01-01 RX ADMIN — LORATADINE 10 MG: 10 TABLET ORAL at 09:00

## 2021-01-01 RX ADMIN — HYDRALAZINE HYDROCHLORIDE 25 MG: 25 TABLET, FILM COATED ORAL at 21:34

## 2021-01-01 RX ADMIN — Medication 10 ML: at 15:24

## 2021-01-01 RX ADMIN — INSULIN LISPRO 2 UNITS: 100 INJECTION, SOLUTION INTRAVENOUS; SUBCUTANEOUS at 21:59

## 2021-01-01 RX ADMIN — AMLODIPINE AND BENAZEPRIL HYDROCHLORIDE 4 CAPSULE: 2.5; 1 CAPSULE ORAL at 08:55

## 2021-01-01 RX ADMIN — HYDRALAZINE HYDROCHLORIDE 25 MG: 25 TABLET, FILM COATED ORAL at 15:46

## 2021-01-01 RX ADMIN — INSULIN LISPRO 2 UNITS: 100 INJECTION, SOLUTION INTRAVENOUS; SUBCUTANEOUS at 16:30

## 2021-01-01 RX ADMIN — ACETYLCYSTEINE 400 MG: 200 SOLUTION ORAL; RESPIRATORY (INHALATION) at 12:33

## 2021-01-01 RX ADMIN — SODIUM CHLORIDE 200 MG: 900 INJECTION, SOLUTION INTRAVENOUS at 05:47

## 2021-01-01 RX ADMIN — ASPIRIN 81 MG: 81 TABLET, COATED ORAL at 15:45

## 2021-01-01 RX ADMIN — GUAIFENESIN 1200 MG: 600 TABLET, EXTENDED RELEASE ORAL at 21:02

## 2021-01-01 RX ADMIN — EPINEPHRINE 1 MG: 0.1 INJECTION, SOLUTION ENDOTRACHEAL; INTRACARDIAC; INTRAVENOUS at 11:39

## 2021-01-01 RX ADMIN — HEPARIN SODIUM 5000 UNITS: 5000 INJECTION INTRAVENOUS; SUBCUTANEOUS at 18:07

## 2021-01-01 RX ADMIN — LORATADINE 10 MG: 10 TABLET ORAL at 10:09

## 2021-01-01 RX ADMIN — HEPARIN SODIUM 5000 UNITS: 5000 INJECTION INTRAVENOUS; SUBCUTANEOUS at 06:22

## 2021-01-01 RX ADMIN — SODIUM CHLORIDE 200 MG: 900 INJECTION, SOLUTION INTRAVENOUS at 10:10

## 2021-01-01 RX ADMIN — HYDRALAZINE HYDROCHLORIDE 25 MG: 25 TABLET, FILM COATED ORAL at 09:09

## 2021-01-01 RX ADMIN — INSULIN LISPRO 2 UNITS: 100 INJECTION, SOLUTION INTRAVENOUS; SUBCUTANEOUS at 22:00

## 2021-01-01 RX ADMIN — HEPARIN SODIUM 5000 UNITS: 5000 INJECTION INTRAVENOUS; SUBCUTANEOUS at 17:30

## 2021-01-01 RX ADMIN — INSULIN LISPRO 2 UNITS: 100 INJECTION, SOLUTION INTRAVENOUS; SUBCUTANEOUS at 11:30

## 2021-01-01 RX ADMIN — ASPIRIN 81 MG: 81 TABLET, COATED ORAL at 09:45

## 2021-01-01 RX ADMIN — DAPTOMYCIN 700 MG: 500 INJECTION, POWDER, LYOPHILIZED, FOR SOLUTION INTRAVENOUS at 18:01

## 2021-01-01 RX ADMIN — METRONIDAZOLE 500 MG: 500 INJECTION, SOLUTION INTRAVENOUS at 22:51

## 2021-01-01 RX ADMIN — ASPIRIN 81 MG: 81 TABLET, COATED ORAL at 08:59

## 2021-01-01 RX ADMIN — ACETAMINOPHEN 650 MG: 325 TABLET, FILM COATED ORAL at 08:55

## 2021-01-01 RX ADMIN — Medication 10 ML: at 21:11

## 2021-01-01 RX ADMIN — SODIUM CHLORIDE 200 MG: 900 INJECTION, SOLUTION INTRAVENOUS at 08:05

## 2021-01-01 RX ADMIN — IPRATROPIUM BROMIDE AND ALBUTEROL SULFATE 3 ML: .5; 3 SOLUTION RESPIRATORY (INHALATION) at 14:01

## 2021-01-01 RX ADMIN — Medication 10 ML: at 22:04

## 2021-01-01 RX ADMIN — HEPARIN SODIUM 5000 UNITS: 5000 INJECTION INTRAVENOUS; SUBCUTANEOUS at 17:32

## 2021-01-01 RX ADMIN — AMLODIPINE AND BENAZEPRIL HYDROCHLORIDE 4 CAPSULE: 2.5; 1 CAPSULE ORAL at 08:24

## 2021-01-01 RX ADMIN — INSULIN GLARGINE 6 UNITS: 100 INJECTION, SOLUTION SUBCUTANEOUS at 21:33

## 2021-01-01 RX ADMIN — HEPARIN SODIUM 5000 UNITS: 5000 INJECTION INTRAVENOUS; SUBCUTANEOUS at 05:33

## 2021-01-01 RX ADMIN — ATORVASTATIN CALCIUM 20 MG: 10 TABLET, FILM COATED ORAL at 21:11

## 2021-01-01 RX ADMIN — INSULIN LISPRO 3 UNITS: 100 INJECTION, SOLUTION INTRAVENOUS; SUBCUTANEOUS at 12:54

## 2021-01-01 RX ADMIN — EPINEPHRINE 1 MG: 0.1 INJECTION, SOLUTION ENDOTRACHEAL; INTRACARDIAC; INTRAVENOUS at 13:13

## 2021-01-01 RX ADMIN — INSULIN LISPRO 5 UNITS: 100 INJECTION, SOLUTION INTRAVENOUS; SUBCUTANEOUS at 09:28

## 2021-01-01 RX ADMIN — ETOMIDATE 5 MG: 2 INJECTION INTRAVENOUS at 12:55

## 2021-01-01 RX ADMIN — SODIUM CHLORIDE 200 MG: 900 INJECTION, SOLUTION INTRAVENOUS at 22:50

## 2021-01-01 RX ADMIN — PIPERACILLIN AND TAZOBACTAM 2.25 G: 2; .25 INJECTION, POWDER, LYOPHILIZED, FOR SOLUTION INTRAVENOUS at 20:06

## 2021-01-01 RX ADMIN — ACETAMINOPHEN 650 MG: 325 TABLET, FILM COATED ORAL at 20:34

## 2021-01-01 RX ADMIN — INSULIN LISPRO 4 UNITS: 100 INJECTION, SOLUTION INTRAVENOUS; SUBCUTANEOUS at 12:19

## 2021-01-01 RX ADMIN — CLOPIDOGREL BISULFATE 75 MG: 75 TABLET ORAL at 18:07

## 2021-01-01 RX ADMIN — CLOPIDOGREL BISULFATE 75 MG: 75 TABLET ORAL at 10:09

## 2021-01-01 RX ADMIN — ASPIRIN 81 MG: 81 TABLET, COATED ORAL at 08:55

## 2021-01-01 RX ADMIN — IPRATROPIUM BROMIDE AND ALBUTEROL SULFATE 3 ML: .5; 3 SOLUTION RESPIRATORY (INHALATION) at 19:33

## 2021-01-01 RX ADMIN — INSULIN LISPRO 4 UNITS: 100 INJECTION, SOLUTION INTRAVENOUS; SUBCUTANEOUS at 17:29

## 2021-01-01 RX ADMIN — HYDRALAZINE HYDROCHLORIDE 25 MG: 25 TABLET, FILM COATED ORAL at 22:23

## 2021-01-01 RX ADMIN — ACETYLCYSTEINE 400 MG: 200 SOLUTION ORAL; RESPIRATORY (INHALATION) at 20:42

## 2021-01-01 RX ADMIN — PROPOFOL 25 MG: 10 INJECTION, EMULSION INTRAVENOUS at 12:55

## 2021-01-01 RX ADMIN — IPRATROPIUM BROMIDE AND ALBUTEROL SULFATE 3 ML: .5; 3 SOLUTION RESPIRATORY (INHALATION) at 19:25

## 2021-01-01 RX ADMIN — EPINEPHRINE 1 MG: 0.1 INJECTION, SOLUTION ENDOTRACHEAL; INTRACARDIAC; INTRAVENOUS at 12:23

## 2021-01-01 RX ADMIN — MULTIPLE VITAMINS W/ MINERALS TAB 1 TABLET: TAB at 08:59

## 2021-01-01 RX ADMIN — INSULIN LISPRO 4 UNITS: 100 INJECTION, SOLUTION INTRAVENOUS; SUBCUTANEOUS at 11:23

## 2021-01-01 RX ADMIN — ATORVASTATIN CALCIUM 20 MG: 10 TABLET, FILM COATED ORAL at 22:36

## 2021-01-01 RX ADMIN — HYDRALAZINE HYDROCHLORIDE 25 MG: 25 TABLET, FILM COATED ORAL at 08:55

## 2021-01-01 RX ADMIN — INSULIN GLARGINE 10 UNITS: 100 INJECTION, SOLUTION SUBCUTANEOUS at 23:37

## 2021-01-01 RX ADMIN — Medication 10 ML: at 17:36

## 2021-01-01 RX ADMIN — HYDRALAZINE HYDROCHLORIDE 25 MG: 25 TABLET, FILM COATED ORAL at 13:38

## 2021-01-01 RX ADMIN — ACETAMINOPHEN 650 MG: 325 TABLET, FILM COATED ORAL at 05:23

## 2021-01-01 RX ADMIN — IPRATROPIUM BROMIDE AND ALBUTEROL SULFATE 3 ML: .5; 3 SOLUTION RESPIRATORY (INHALATION) at 07:34

## 2021-01-01 RX ADMIN — POTASSIUM CHLORIDE 40 MEQ: 1.5 FOR SOLUTION ORAL at 15:46

## 2021-01-01 RX ADMIN — INSULIN GLARGINE 6 UNITS: 100 INJECTION, SOLUTION SUBCUTANEOUS at 21:41

## 2021-01-01 RX ADMIN — INSULIN LISPRO 2 UNITS: 100 INJECTION, SOLUTION INTRAVENOUS; SUBCUTANEOUS at 16:00

## 2021-01-01 RX ADMIN — Medication 5 ML: at 22:02

## 2021-01-01 RX ADMIN — CLOPIDOGREL BISULFATE 75 MG: 75 TABLET ORAL at 15:22

## 2021-01-01 RX ADMIN — ETOMIDATE 5 MG: 2 INJECTION INTRAVENOUS at 12:58

## 2021-01-01 RX ADMIN — POLYETHYLENE GLYCOL 3350 17 G: 17 POWDER, FOR SOLUTION ORAL at 12:35

## 2021-01-01 RX ADMIN — INSULIN LISPRO 4 UNITS: 100 INJECTION, SOLUTION INTRAVENOUS; SUBCUTANEOUS at 11:30

## 2021-01-01 RX ADMIN — HYDRALAZINE HYDROCHLORIDE 25 MG: 25 TABLET, FILM COATED ORAL at 21:11

## 2021-01-01 RX ADMIN — Medication 10 ML: at 06:57

## 2021-01-01 RX ADMIN — LORATADINE 10 MG: 10 TABLET ORAL at 12:35

## 2021-01-01 RX ADMIN — DIPHENHYDRAMINE HYDROCHLORIDE 25 MG: 25 CAPSULE ORAL at 20:34

## 2021-01-01 RX ADMIN — SODIUM CHLORIDE 200 MG: 900 INJECTION, SOLUTION INTRAVENOUS at 02:36

## 2021-01-01 RX ADMIN — HYDRALAZINE HYDROCHLORIDE 25 MG: 25 TABLET, FILM COATED ORAL at 17:30

## 2021-01-01 RX ADMIN — SODIUM CHLORIDE 200 MG: 900 INJECTION, SOLUTION INTRAVENOUS at 15:22

## 2021-01-01 RX ADMIN — INSULIN LISPRO 10 UNITS: 100 INJECTION, SOLUTION INTRAVENOUS; SUBCUTANEOUS at 07:30

## 2021-01-01 RX ADMIN — METRONIDAZOLE 500 MG: 500 INJECTION, SOLUTION INTRAVENOUS at 03:45

## 2021-01-01 RX ADMIN — HEPARIN SODIUM 5000 UNITS: 5000 INJECTION INTRAVENOUS; SUBCUTANEOUS at 05:45

## 2021-01-01 RX ADMIN — INSULIN LISPRO 3 UNITS: 100 INJECTION, SOLUTION INTRAVENOUS; SUBCUTANEOUS at 17:29

## 2021-01-01 RX ADMIN — METRONIDAZOLE 500 MG: 500 INJECTION, SOLUTION INTRAVENOUS at 05:07

## 2021-01-01 RX ADMIN — POTASSIUM CHLORIDE 40 MEQ: 1.5 FOR SOLUTION ORAL at 08:25

## 2021-01-01 RX ADMIN — GUAIFENESIN 1200 MG: 600 TABLET, EXTENDED RELEASE ORAL at 08:59

## 2021-01-01 RX ADMIN — IPRATROPIUM BROMIDE AND ALBUTEROL SULFATE 3 ML: .5; 3 SOLUTION RESPIRATORY (INHALATION) at 00:59

## 2021-01-01 RX ADMIN — INSULIN GLARGINE 14 UNITS: 100 INJECTION, SOLUTION SUBCUTANEOUS at 22:00

## 2021-01-01 RX ADMIN — ACETYLCYSTEINE 400 MG: 200 SOLUTION ORAL; RESPIRATORY (INHALATION) at 00:59

## 2021-01-01 RX ADMIN — SODIUM BICARBONATE 50 MEQ: 84 INJECTION INTRAVENOUS at 11:57

## 2021-01-01 RX ADMIN — FUROSEMIDE 80 MG: 10 INJECTION, SOLUTION INTRAMUSCULAR; INTRAVENOUS at 09:45

## 2021-01-01 RX ADMIN — EPINEPHRINE 1 MG: 0.1 INJECTION, SOLUTION ENDOTRACHEAL; INTRACARDIAC; INTRAVENOUS at 13:32

## 2021-01-01 RX ADMIN — HYDRALAZINE HYDROCHLORIDE 25 MG: 25 TABLET, FILM COATED ORAL at 10:09

## 2021-01-01 RX ADMIN — METOCLOPRAMIDE HYDROCHLORIDE 10 MG: 5 INJECTION INTRAMUSCULAR; INTRAVENOUS at 12:51

## 2021-01-01 RX ADMIN — ACETYLCYSTEINE 200 MG: 200 SOLUTION ORAL; RESPIRATORY (INHALATION) at 19:34

## 2021-01-01 RX ADMIN — HYDRALAZINE HYDROCHLORIDE 25 MG: 25 TABLET, FILM COATED ORAL at 00:14

## 2021-01-01 RX ADMIN — Medication 10 ML: at 22:36

## 2021-01-01 RX ADMIN — VANCOMYCIN HYDROCHLORIDE 1250 MG: 1.25 INJECTION, POWDER, LYOPHILIZED, FOR SOLUTION INTRAVENOUS at 18:09

## 2021-01-01 RX ADMIN — HEPARIN SODIUM 5000 UNITS: 5000 INJECTION INTRAVENOUS; SUBCUTANEOUS at 17:40

## 2021-01-01 RX ADMIN — ASPIRIN 81 MG: 81 TABLET, COATED ORAL at 13:38

## 2021-01-01 RX ADMIN — SODIUM BICARBONATE 100 MEQ: 84 INJECTION INTRAVENOUS at 11:42

## 2021-01-01 RX ADMIN — INSULIN LISPRO 5 UNITS: 100 INJECTION, SOLUTION INTRAVENOUS; SUBCUTANEOUS at 12:09

## 2021-01-01 RX ADMIN — HEPARIN SODIUM 5000 UNITS: 5000 INJECTION INTRAVENOUS; SUBCUTANEOUS at 06:01

## 2021-01-01 RX ADMIN — Medication 10 ML: at 22:26

## 2021-01-01 RX ADMIN — SODIUM CHLORIDE 200 MG: 900 INJECTION, SOLUTION INTRAVENOUS at 13:43

## 2021-01-01 RX ADMIN — INSULIN GLARGINE 14 UNITS: 100 INJECTION, SOLUTION SUBCUTANEOUS at 22:51

## 2021-01-01 RX ADMIN — ASPIRIN 81 MG: 81 TABLET, COATED ORAL at 08:24

## 2021-01-01 RX ADMIN — HYDRALAZINE HYDROCHLORIDE 25 MG: 25 TABLET, FILM COATED ORAL at 18:08

## 2021-01-01 RX ADMIN — LORATADINE 10 MG: 10 TABLET ORAL at 09:09

## 2021-01-01 RX ADMIN — Medication 10 ML: at 05:24

## 2021-01-01 RX ADMIN — POTASSIUM CHLORIDE 40 MEQ: 1.5 FOR SOLUTION ORAL at 17:13

## 2021-01-01 RX ADMIN — GUAIFENESIN 1200 MG: 600 TABLET, EXTENDED RELEASE ORAL at 20:34

## 2021-01-01 RX ADMIN — HEPARIN SODIUM 5000 UNITS: 5000 INJECTION INTRAVENOUS; SUBCUTANEOUS at 05:40

## 2021-01-01 RX ADMIN — ASPIRIN 81 MG: 81 TABLET, COATED ORAL at 15:22

## 2021-01-01 RX ADMIN — INSULIN LISPRO 4 UNITS: 100 INJECTION, SOLUTION INTRAVENOUS; SUBCUTANEOUS at 16:59

## 2021-01-01 RX ADMIN — ACETYLCYSTEINE 400 MG: 200 SOLUTION ORAL; RESPIRATORY (INHALATION) at 06:48

## 2021-01-01 RX ADMIN — METOCLOPRAMIDE HYDROCHLORIDE 10 MG: 5 INJECTION INTRAMUSCULAR; INTRAVENOUS at 12:53

## 2021-01-01 RX ADMIN — CLOPIDOGREL BISULFATE 75 MG: 75 TABLET ORAL at 12:35

## 2021-01-01 RX ADMIN — INSULIN GLARGINE 14 UNITS: 100 INJECTION, SOLUTION SUBCUTANEOUS at 21:52

## 2021-01-01 RX ADMIN — MELATONIN TAB 5 MG 5 MG: 5 TAB at 21:42

## 2021-01-01 RX ADMIN — AMIODARONE HYDROCHLORIDE 150 MG: 50 INJECTION, SOLUTION INTRAVENOUS at 11:45

## 2021-01-01 RX ADMIN — Medication 10 ML: at 06:05

## 2021-01-01 RX ADMIN — POTASSIUM CHLORIDE 40 MEQ: 1.5 FOR SOLUTION ORAL at 18:10

## 2021-01-01 RX ADMIN — HEPARIN SODIUM 5000 UNITS: 5000 INJECTION INTRAVENOUS; SUBCUTANEOUS at 05:55

## 2021-01-01 RX ADMIN — LORATADINE 10 MG: 10 TABLET ORAL at 09:29

## 2021-01-01 RX ADMIN — ACETAMINOPHEN 650 MG: 325 TABLET, FILM COATED ORAL at 19:06

## 2021-01-01 RX ADMIN — Medication 10 ML: at 13:56

## 2021-01-01 RX ADMIN — HYDRALAZINE HYDROCHLORIDE 25 MG: 25 TABLET, FILM COATED ORAL at 15:21

## 2021-01-01 RX ADMIN — INSULIN LISPRO 5 UNITS: 100 INJECTION, SOLUTION INTRAVENOUS; SUBCUTANEOUS at 13:44

## 2021-01-01 RX ADMIN — ACETYLCYSTEINE 400 MG: 200 SOLUTION ORAL; RESPIRATORY (INHALATION) at 02:15

## 2021-01-01 RX ADMIN — ATENOLOL 50 MG: 50 TABLET ORAL at 08:07

## 2021-01-01 RX ADMIN — SODIUM CHLORIDE 200 MG: 900 INJECTION, SOLUTION INTRAVENOUS at 10:18

## 2021-01-01 RX ADMIN — INSULIN LISPRO 3 UNITS: 100 INJECTION, SOLUTION INTRAVENOUS; SUBCUTANEOUS at 22:04

## 2021-01-01 RX ADMIN — METRONIDAZOLE 500 MG: 500 INJECTION, SOLUTION INTRAVENOUS at 05:10

## 2021-01-01 RX ADMIN — MEROPENEM 500 MG: 500 INJECTION, POWDER, FOR SOLUTION INTRAVENOUS at 10:32

## 2021-01-01 RX ADMIN — SODIUM CHLORIDE 200 MG: 900 INJECTION, SOLUTION INTRAVENOUS at 20:02

## 2021-01-01 RX ADMIN — METRONIDAZOLE 500 MG: 500 INJECTION, SOLUTION INTRAVENOUS at 13:44

## 2021-01-01 RX ADMIN — Medication 10 ML: at 13:40

## 2021-01-01 RX ADMIN — ATORVASTATIN CALCIUM 20 MG: 10 TABLET, FILM COATED ORAL at 23:15

## 2021-01-01 RX ADMIN — HYDRALAZINE HYDROCHLORIDE 25 MG: 25 TABLET, FILM COATED ORAL at 22:04

## 2021-01-01 RX ADMIN — INSULIN GLARGINE 6 UNITS: 100 INJECTION, SOLUTION SUBCUTANEOUS at 21:59

## 2021-01-01 RX ADMIN — PROCHLORPERAZINE EDISYLATE 5 MG: 5 INJECTION INTRAMUSCULAR; INTRAVENOUS at 04:15

## 2021-01-01 RX ADMIN — PIPERACILLIN AND TAZOBACTAM 3.38 G: 3; .375 INJECTION, POWDER, LYOPHILIZED, FOR SOLUTION INTRAVENOUS at 18:19

## 2021-01-01 RX ADMIN — METRONIDAZOLE 500 MG: 500 INJECTION, SOLUTION INTRAVENOUS at 14:19

## 2021-01-01 RX ADMIN — SODIUM BICARBONATE 50 MEQ: 84 INJECTION INTRAVENOUS at 12:26

## 2021-01-01 RX ADMIN — INSULIN LISPRO 4 UNITS: 100 INJECTION, SOLUTION INTRAVENOUS; SUBCUTANEOUS at 09:28

## 2021-01-01 RX ADMIN — Medication 10 ML: at 05:34

## 2021-01-01 RX ADMIN — INSULIN LISPRO 4 UNITS: 100 INJECTION, SOLUTION INTRAVENOUS; SUBCUTANEOUS at 08:06

## 2021-01-01 RX ADMIN — BENZONATATE 100 MG: 100 CAPSULE ORAL at 10:14

## 2021-01-01 RX ADMIN — SODIUM CHLORIDE 500 ML: 9 INJECTION, SOLUTION INTRAVENOUS at 19:08

## 2021-01-01 RX ADMIN — ATORVASTATIN CALCIUM 20 MG: 10 TABLET, FILM COATED ORAL at 21:33

## 2021-01-01 RX ADMIN — HYDRALAZINE HYDROCHLORIDE 25 MG: 25 TABLET, FILM COATED ORAL at 22:35

## 2021-01-01 RX ADMIN — EPINEPHRINE 1 MG: 0.1 INJECTION, SOLUTION ENDOTRACHEAL; INTRACARDIAC; INTRAVENOUS at 11:56

## 2021-01-01 RX ADMIN — INSULIN LISPRO 4 UNITS: 100 INJECTION, SOLUTION INTRAVENOUS; SUBCUTANEOUS at 08:53

## 2021-01-01 RX ADMIN — SODIUM CHLORIDE: 9 INJECTION, SOLUTION INTRAVENOUS at 12:55

## 2021-01-01 RX ADMIN — SODIUM CHLORIDE 200 MG: 900 INJECTION, SOLUTION INTRAVENOUS at 04:03

## 2021-01-01 RX ADMIN — SODIUM CHLORIDE 200 MG: 900 INJECTION, SOLUTION INTRAVENOUS at 03:29

## 2021-01-01 RX ADMIN — ATENOLOL 50 MG: 50 TABLET ORAL at 18:06

## 2021-01-01 RX ADMIN — INSULIN LISPRO 4 UNITS: 100 INJECTION, SOLUTION INTRAVENOUS; SUBCUTANEOUS at 07:30

## 2021-01-01 RX ADMIN — MULTIPLE VITAMINS W/ MINERALS TAB 1 TABLET: TAB at 08:07

## 2021-01-01 RX ADMIN — IPRATROPIUM BROMIDE AND ALBUTEROL SULFATE 3 ML: .5; 3 SOLUTION RESPIRATORY (INHALATION) at 08:37

## 2021-01-01 RX ADMIN — INSULIN GLARGINE 6 UNITS: 100 INJECTION, SOLUTION SUBCUTANEOUS at 22:35

## 2021-01-01 RX ADMIN — VANCOMYCIN HYDROCHLORIDE 1000 MG: 1 INJECTION, POWDER, LYOPHILIZED, FOR SOLUTION INTRAVENOUS at 18:20

## 2021-01-01 RX ADMIN — INSULIN LISPRO 4 UNITS: 100 INJECTION, SOLUTION INTRAVENOUS; SUBCUTANEOUS at 12:54

## 2021-01-01 RX ADMIN — LORATADINE 10 MG: 10 TABLET ORAL at 08:07

## 2021-01-01 RX ADMIN — Medication 10 ML: at 23:16

## 2021-01-01 RX ADMIN — ATORVASTATIN CALCIUM 20 MG: 10 TABLET, FILM COATED ORAL at 22:23

## 2021-01-01 RX ADMIN — HYDRALAZINE HYDROCHLORIDE 25 MG: 25 TABLET, FILM COATED ORAL at 21:58

## 2021-01-01 RX ADMIN — EPINEPHRINE 1 MG: 0.1 INJECTION, SOLUTION ENDOTRACHEAL; INTRACARDIAC; INTRAVENOUS at 11:59

## 2021-01-01 RX ADMIN — IPRATROPIUM BROMIDE AND ALBUTEROL SULFATE 3 ML: .5; 3 SOLUTION RESPIRATORY (INHALATION) at 02:15

## 2021-01-01 RX ADMIN — HYDROXYZINE PAMOATE 50 MG: 50 CAPSULE ORAL at 23:37

## 2021-01-01 RX ADMIN — IPRATROPIUM BROMIDE AND ALBUTEROL SULFATE 3 ML: .5; 3 SOLUTION RESPIRATORY (INHALATION) at 12:33

## 2021-01-01 RX ADMIN — SODIUM CHLORIDE 200 MG: 900 INJECTION, SOLUTION INTRAVENOUS at 20:39

## 2021-01-01 RX ADMIN — SODIUM POLYSTYRENE SULFONATE 30 G: 15 SUSPENSION ORAL; RECTAL at 18:10

## 2021-01-01 RX ADMIN — ATENOLOL 50 MG: 50 TABLET ORAL at 10:09

## 2021-01-01 RX ADMIN — Medication 10 ML: at 06:02

## 2021-01-01 RX ADMIN — INSULIN LISPRO 2 UNITS: 100 INJECTION, SOLUTION INTRAVENOUS; SUBCUTANEOUS at 16:34

## 2021-01-01 RX ADMIN — BENZONATATE 100 MG: 100 CAPSULE ORAL at 23:37

## 2021-01-01 RX ADMIN — Medication 10 ML: at 22:53

## 2021-01-01 RX ADMIN — BENZOCAINE, BUTAMBEN, AND TETRACAINE HYDROCHLORIDE 1 SPRAY: .028; .004; .004 AEROSOL, SPRAY TOPICAL at 12:52

## 2021-01-01 RX ADMIN — GUAIFENESIN 1200 MG: 600 TABLET, EXTENDED RELEASE ORAL at 22:51

## 2021-01-01 RX ADMIN — Medication 10 ML: at 05:52

## 2021-01-01 RX ADMIN — SODIUM BICARBONATE 50 MEQ: 84 INJECTION INTRAVENOUS at 11:58

## 2021-01-01 RX ADMIN — INSULIN LISPRO 3 UNITS: 100 INJECTION, SOLUTION INTRAVENOUS; SUBCUTANEOUS at 16:30

## 2021-01-01 RX ADMIN — ATENOLOL 50 MG: 50 TABLET ORAL at 09:29

## 2021-01-01 RX ADMIN — DAPTOMYCIN 700 MG: 500 INJECTION, POWDER, LYOPHILIZED, FOR SOLUTION INTRAVENOUS at 17:21

## 2021-01-01 RX ADMIN — HEPARIN SODIUM 5000 UNITS: 5000 INJECTION INTRAVENOUS; SUBCUTANEOUS at 18:09

## 2021-01-01 RX ADMIN — SODIUM CHLORIDE 200 MG: 900 INJECTION, SOLUTION INTRAVENOUS at 10:32

## 2021-01-01 RX ADMIN — ACETYLCYSTEINE 400 MG: 200 SOLUTION ORAL; RESPIRATORY (INHALATION) at 13:21

## 2021-01-01 RX ADMIN — ACETYLCYSTEINE 400 MG: 200 SOLUTION ORAL; RESPIRATORY (INHALATION) at 08:38

## 2021-01-01 RX ADMIN — ATENOLOL 50 MG: 50 TABLET ORAL at 12:35

## 2021-01-01 RX ADMIN — FUROSEMIDE 80 MG: 10 INJECTION, SOLUTION INTRAMUSCULAR; INTRAVENOUS at 14:13

## 2021-01-01 RX ADMIN — SODIUM CHLORIDE 200 MG: 900 INJECTION, SOLUTION INTRAVENOUS at 17:55

## 2021-01-01 RX ADMIN — SODIUM CHLORIDE 200 MG: 900 INJECTION, SOLUTION INTRAVENOUS at 19:57

## 2021-01-01 RX ADMIN — ACETYLCYSTEINE 400 MG: 200 SOLUTION ORAL; RESPIRATORY (INHALATION) at 07:34

## 2021-01-01 RX ADMIN — ASPIRIN 81 MG: 81 TABLET, COATED ORAL at 08:07

## 2021-01-01 RX ADMIN — INSULIN LISPRO 4 UNITS: 100 INJECTION, SOLUTION INTRAVENOUS; SUBCUTANEOUS at 12:50

## 2021-01-01 RX ADMIN — SODIUM BICARBONATE 50 MEQ: 84 INJECTION INTRAVENOUS at 13:13

## 2021-01-01 RX ADMIN — IPRATROPIUM BROMIDE AND ALBUTEROL SULFATE 3 ML: .5; 3 SOLUTION RESPIRATORY (INHALATION) at 01:03

## 2021-01-01 RX ADMIN — INSULIN LISPRO 4 UNITS: 100 INJECTION, SOLUTION INTRAVENOUS; SUBCUTANEOUS at 13:44

## 2021-01-01 RX ADMIN — ATENOLOL 50 MG: 50 TABLET ORAL at 09:09

## 2021-01-01 RX ADMIN — ACETYLCYSTEINE 400 MG: 200 SOLUTION ORAL; RESPIRATORY (INHALATION) at 14:01

## 2021-01-01 RX ADMIN — HEPARIN SODIUM 5000 UNITS: 5000 INJECTION INTRAVENOUS; SUBCUTANEOUS at 13:48

## 2021-01-01 RX ADMIN — LORATADINE 10 MG: 10 TABLET ORAL at 13:48

## 2021-01-01 RX ADMIN — INSULIN LISPRO 5 UNITS: 100 INJECTION, SOLUTION INTRAVENOUS; SUBCUTANEOUS at 11:30

## 2021-01-01 RX ADMIN — PROCHLORPERAZINE EDISYLATE 5 MG: 5 INJECTION INTRAMUSCULAR; INTRAVENOUS at 17:30

## 2021-01-01 RX ADMIN — HYDRALAZINE HYDROCHLORIDE 25 MG: 25 TABLET, FILM COATED ORAL at 09:29

## 2021-01-01 RX ADMIN — IPRATROPIUM BROMIDE AND ALBUTEROL SULFATE 3 ML: .5; 3 SOLUTION RESPIRATORY (INHALATION) at 01:20

## 2021-01-01 RX ADMIN — LORATADINE 10 MG: 10 TABLET ORAL at 08:59

## 2021-01-01 RX ADMIN — CLOPIDOGREL BISULFATE 75 MG: 75 TABLET ORAL at 15:45

## 2021-02-24 PROBLEM — N18.6 ESRD ON DIALYSIS (HCC): Status: ACTIVE | Noted: 2021-01-01

## 2021-02-24 PROBLEM — A41.9 SEPSIS (HCC): Status: ACTIVE | Noted: 2021-01-01

## 2021-02-24 PROBLEM — Z99.2 ESRD ON DIALYSIS (HCC): Status: ACTIVE | Noted: 2021-01-01

## 2021-02-24 NOTE — ED PROVIDER NOTES
EMERGENCY DEPARTMENT HISTORY AND PHYSICAL EXAM 
 
 
Date: 2/24/2021 Patient Name: Remberto Arciniega History of Presenting Illness Chief Complaint Patient presents with  Vascular Access Problem History Provided By: Patient and Patient's  HPI: Remberto Arciniega, 77 y.o. female presents to the ED with cc of Chief Complaint Patient presents with  Vascular Access Problem Patient was sent to the ER because her fistula was bleeding but on presentation she had a temp of 103.4, denies any cough or shortness of breath but states that she feels weak Moderate severity, no known exacerbating or relieving factors, no other associated signs and symptoms. There are no other complaints, changes, or physical findings at this time. PCP: Sondra Vasquez MD 
 
No current facility-administered medications on file prior to encounter. Current Outpatient Medications on File Prior to Encounter Medication Sig Dispense Refill  insulin glargine (Lantus Solostar U-100 Insulin) 100 unit/mL (3 mL) inpn Decrease to 14 units after 11 am daily 15 mL 4  
 aspirin delayed-release 81 mg tablet Take 81 mg by mouth daily.  clopidogreL (Plavix) 75 mg tab Take 75 mg by mouth daily.  atenoloL (TENORMIN) 50 mg tablet Take 1 Tab by mouth daily. Stop 100 mg dose (Patient taking differently: Take 50 mg by mouth daily. Stop 100 mg dose Takes pm) 90 Tab 4  
 rosuvastatin (CRESTOR) 10 mg tablet TAKE 1 TABLET NIGHTLY 90 Tab 4  
 amLODIPine-benazepril (LOTREL) 10-40 mg per capsule TAKE 1 CAPSULE DAILY 90 Cap 3  
 glucose blood VI test strips (ONETOUCH ULTRA TEST) strip Test 4 times daily. DX CODE E11.65 400 Strip 4  
 flash glucose scanning reader (FREESTYLE BENEDICT 10 DAY READER) misc Use as directed 1 Each 0  
 flash glucose sensor (FREESTYLE BENEDICT 10 DAY SENSOR) kit Use it every 10 days 3 Kit 6  Blood-Glucose Meter (ONETOUCH ULTRAMINI) monitoring kit Dispense 1 meter. Test 4 times daily.  Dx code E11.65 1 Kit 0  
 NYSTOP powder CHANDRAKANT UTD BID EXTERNALLY  5  
 Insulin Needles, Disposable, (CELSO PEN NEEDLE) 32 gauge x 5/32\" ndle Use 4 times daily. Dx code E11.65 200 Each 6  
 Lancets (ONE TOUCH DELICA) misc Test 4 times daily. DX CODE 250.00 400 each 4  
 insulin syringe-needle U-100 (INSULIN SYRINGE ULTRA-FINE) 1 mL 31 x 15/64\" syrg 1 Syringe by Does Not Apply route daily. Use once daily 100 Each 4  
 loratadine (CLARITIN) 10 mg tablet Take 10 mg by mouth daily.  hydrALAZINE (APRESOLINE) 25 mg tablet Take 1 Tab by mouth three (3) times daily. 270 Tab 4  MULTIVITS,CA,MINERALS/IRON/FA (ONE-A-DAY WOMENS FORMULA PO) Take  by mouth. Past History Past Medical History: 
Past Medical History:  
Diagnosis Date  CAD (coronary artery disease) stents x 2  Chronic kidney disease   
 t-th-s/ renal care in [facility] hghts  Diabetes ([facility] [address] type 2  
 Hypercholesterolemia  Hypertension  Morbid obesity ([facility] [address] bmi 35  Renal insufficiency Past Surgical History: 
Past Surgical History:  
Procedure Laterality Date  COLONOSCOPY  EGD  HX APPENDECTOMY  2019  HX GASTRIC BYPASS  2009  
 gastric band  HX ORTHOPAEDIC Bilateral   
 ctr  HX VASCULAR ACCESS Left 2020  
 lorenzo cath for dialysis  UT CARDIAC SURG PROCEDURE UNLIST 2 cardiac stents  UT LAP, PLACE ADJUST GASTR BAND  7/29/10 Realize-C  
 UT NEEDLE BIOPSY LIVER,W OTHR PROC  7/29/10 [address]  7/29/10 Family History: 
Family History Problem Relation Age of Onset  Heart Disease Mother  Diabetes Mother  Kidney Disease Mother Meng Other Mother  Cancer Father  Emphysema Father  Asthma Sister  Heart Disease Sister  Diabetes Child Social History: 
Social History Tobacco Use  Smoking status: Never Smoker  Smokeless tobacco: Never Used Substance Use Topics  Alcohol use: No  
 Drug use: No  
 
 
Allergies: No Known Allergies Review of Systems Review of Systems Constitutional: Positive for fatigue and fever. HENT: Negative for congestion, nosebleeds, sinus pressure and sinus pain. Eyes: Negative. Negative for photophobia. Respiratory: Negative for apnea, cough, choking, chest tightness, shortness of breath, wheezing and stridor. Cardiovascular: Negative for chest pain. Gastrointestinal: Negative. Negative for abdominal pain. Genitourinary: Negative. Musculoskeletal: Negative. Negative for back pain and gait problem. Skin: Negative. Allergic/Immunologic: Negative. Neurological: Negative. Negative for weakness, light-headedness and numbness. Hematological: Negative. Psychiatric/Behavioral: Negative. Physical Exam  
Physical Exam 
Vitals signs and nursing note reviewed. Constitutional:   
   Appearance: Normal appearance. She is obese. She is ill-appearing. HENT:  
   Head: Normocephalic and atraumatic. Nose: No congestion or rhinorrhea. Eyes:  
   Extraocular Movements: Extraocular movements intact. Pupils: Pupils are equal, round, and reactive to light. Neck: Musculoskeletal: Normal range of motion and neck supple. Cardiovascular:  
   Rate and Rhythm: Normal rate and regular rhythm. Pulmonary:  
   Effort: Pulmonary effort is normal.  
   Breath sounds: Normal breath sounds. Abdominal:  
   General: Abdomen is flat. Bowel sounds are normal. There is no distension. Tenderness: There is no abdominal tenderness. There is no guarding. Musculoskeletal: Normal range of motion. Skin: 
   General: Skin is warm and dry. Comments: Noted in the left arm bleeding controlled Neurological:  
   General: No focal deficit present. Mental Status: She is alert and oriented to person, place, and time. Psychiatric:     
   Mood and Affect: Mood normal.  
 
 
 
Diagnostic Study Results Labs - Recent Results (from the past 12 hour(s)) LACTIC ACID Collection Time: 02/24/21  4:00 PM  
Result Value Ref Range Lactic acid 3.2 (HH) 0.4 - 2.0 mmol/L METABOLIC PANEL, COMPREHENSIVE Collection Time: 02/24/21  4:00 PM  
Result Value Ref Range Sodium 135 (L) 136 - 145 mmol/L Potassium 4.3 3.5 - 5.1 mmol/L Chloride 100 97 - 108 mmol/L  
 CO2 25 21 - 32 mmol/L Anion gap 10 5 - 15 mmol/L Glucose 252 (H) 65 - 100 mg/dL BUN 33 (H) 6 - 20 mg/dL Creatinine 4.31 (H) 0.55 - 1.02 mg/dL BUN/Creatinine ratio 8 (L) 12 - 20 GFR est AA 12 (L) >60 ml/min/1.73m2 GFR est non-AA 10 (L) >60 ml/min/1.73m2 Calcium 9.6 8.5 - 10.1 mg/dL Bilirubin, total 0.3 0.2 - 1.0 mg/dL AST (SGOT) 14 (L) 15 - 37 U/L  
 ALT (SGPT) 19 12 - 78 U/L Alk. phosphatase 166 (H) 45 - 117 U/L Protein, total 7.8 6.4 - 8.2 g/dL Albumin 3.7 3.5 - 5.0 g/dL Globulin 4.1 (H) 2.0 - 4.0 g/dL A-G Ratio 0.9 (L) 1.1 - 2.2    
TROPONIN I Collection Time: 02/24/21  4:00 PM  
Result Value Ref Range Troponin-I, Qt. <0.05 <0.05 ng/mL Labs reviewed by me Radiologic Studies -  
XR CHEST PORT Final Result No acute abnormality seen. CT Results  (Last 48 hours) None CXR Results  (Last 48 hours) 02/24/21 1647  XR CHEST PORT Final result Impression:  No acute abnormality seen. Narrative:  History: Evaluate for infiltrate Single view of the chest was obtained. There is a very poor depth of  
inspiration. There is some prominence of the interstitium. No focal  
consolidation is seen. No acute bony abnormality seen. Medical Decision Making I am the first provider for this patient. I reviewed the vital signs, available nursing notes, past medical history, past surgical history, family history and social history. RADIOLOGY report and LABS reviewed by me Vital Signs-Reviewed the patient's vital signs.  
Patient Vitals for the past 12 hrs: 
 Temp Pulse Resp BP SpO2 02/24/21 1426 (!) 103.4 °F (39.7 °C) 100 20 (!) 148/71 97 % EKG interpretation: (Preliminary) Records Reviewed: Nurse's note. Provider Notes (Medical Decision Making): 
 
Patient presents with DIFF DX : Sepsis, pneumonia, Covid 23 ED Course:  
Initial assessment performed. The patients presenting problems have been discussed, and they are in agreement with the care plan formulated and outlined with them. I have encouraged them to ask questions as they arise throughout their visit. TREATMENT RESPONSE -Stable Kurtis Bueno MD 
 
 
Disposition: 
Admitted Diagnostic tests were reviewed and questions answered. Diagnosis, care plan and treatment options were discussed. The patient understand instructions and will follow up as directed. Condition stable Admitting Provider: No admitting provider for patient encounter. Consulting Provider: No ref. provider found DISCHARGE PLAN: 
1. Current Discharge Medication List  
  
 
2. Follow-up Information None 3. Return to ED if worse Diagnosis Clinical Impression: ICD-10-CM ICD-9-CM 1. Sepsis, due to unspecified organism, unspecified whether acute organ dysfunction present ([facility] [address]  A41.9 038.9   
  995.91   
2. ESRD (end stage renal disease) ([facility] [address]  N18.6 585.6 3. Fever, unspecified fever cause  R50.9 780.60 Attestations: 
 
Kurtis Bueno MD 
 
Please note that this dictation was completed with [employer], the computer voice recognition software. Quite often unanticipated grammatical, syntax, homophones, and other interpretive errors are inadvertently transcribed by the computer software. Please disregard these errors. Please excuse any errors that have escaped final proofreading. Thank you.

## 2021-02-24 NOTE — ED NOTES
Patient moved to ED 27 on cart. Speech clear, skin hot to touch. Temp 100.8 oral. On CM placed on purwick for patietn needs to void.  Side rails up, call bell at side, phone given to patient, called x 2 busy signal.

## 2021-02-24 NOTE — ED TRIAGE NOTES
Spouse reports that pt fistula started bleeding last night , seen by pcp this am referred to er for labs

## 2021-02-25 NOTE — CONSULTS
Renal Consultation Note NAME:  Yadira Witt :   1955 MRN:   [id number] ATTENDING: Harlan Monroe MD 
PCP:  Stef Gomez MD 
 
Date/Time:  2021 1:08 PM 
 
 
Subjective:  
Patient is a 57-year-old Afro-American female with history of ESRD on hemodialysis every TTS at [address] [facility] renal care [place], diabetes, hypertension who presented with a fever of 103.4 and a WBC count of 14,000 yesterday. On admission her blood cultures were sent which already have gram-positive cocci in clusters growing. Renal consultation placed for dialysis. Patient seen in the room. She is lying comfortably in bed. She has been started on vancomycin and Zosyn empirically. According to the patient after dialysis on Tuesday she had bleeding from the AV graft site all night. At the end she passed a long banana-shaped clot. Her  tied dressing around the AV graft and the bleeding has now stopped. She denies any other complaints. Denies any cough. She is currently afebrile. Denies any sick contacts. Past Medical History:  
Diagnosis Date  CAD (coronary artery disease) stents x 2  Chronic kidney disease   
 t-th-s/ renal care in [facility] hghts  Diabetes ([facility] [address] type 2  
 Hypercholesterolemia  Hypertension  Morbid obesity ([facility] [address] bmi 35  Renal insufficiency Past Surgical History:  
Procedure Laterality Date  COLONOSCOPY  EGD  HX APPENDECTOMY  2019  HX GASTRIC BYPASS  2009  
 gastric band  HX ORTHOPAEDIC Bilateral   
 ctr  HX VASCULAR ACCESS Left 2020  
 lorenzo cath for dialysis  [place] CARDIAC SURG PROCEDURE UNLIST 2 cardiac stents  [place] LAP, PLACE ADJUST GASTR BAND  7/29/10 Realize-C  
 [place] NEEDLE BIOPSY LIVER,W OTHR PROC  7/29/10 [address] [facility]  7/29/10 Social History Tobacco Use  Smoking status: Never Smoker  Smokeless tobacco: Never Used Substance Use Topics  Alcohol use:  No  
 Family History Problem Relation Age of Onset  Heart Disease Mother  Diabetes Mother  Kidney Disease Mother Zannie Cowden Other Mother  Cancer Father  Emphysema Father  Asthma Sister  Heart Disease Sister  Diabetes Child No Known Allergies Prior to Admission medications Medication Sig Start Date End Date Taking? Authorizing Provider  
insulin glargine (Lantus Solostar U-100 Insulin) 100 unit/mL (3 mL) inpn Decrease to 14 units after 11 am daily 10/9/20  Yes Antonio Bob MD  
aspirin delayed-release 81 mg tablet Take 81 mg by mouth daily. Yes Provider, Historical  
atenoloL (TENORMIN) 50 mg tablet Take 1 Tab by mouth daily. Stop 100 mg dose Patient taking differently: Take 50 mg by mouth daily. Stop 100 mg dose Takes pm 6/8/20  Yes Antonio Bob MD  
rosuvastatin (CRESTOR) 10 mg tablet TAKE 1 TABLET NIGHTLY 6/8/20  Yes Antonio Bob MD  
amLODIPine-benazepril (LOTREL) 10-40 mg per capsule TAKE 1 CAPSULE DAILY 5/19/19  Yes Antonio Bob MD  
glucose blood VI test strips (ONETOUCH ULTRA TEST) strip Test 4 times daily. DX CODE E11.65 12/4/18  Yes Antonio Bob MD  
flash glucose scanning reader (FREESTYLE BENEDICT 10 DAY READER) misc Use as directed 10/18/18  Yes Antonio Bob MD  
flash glucose sensor (FREESTYLE BENEDICT 10 DAY SENSOR) kit Use it every 10 days 10/18/18  Yes Antonio Bob MD  
Blood-Glucose Meter (ONETOUCH ULTRAMINI) monitoring kit Dispense 1 meter. Test 4 times daily. Dx code E11.65 6/22/18  Yes Antonio Bob MD  
NYSTOP powder CHANDRAKANT UTD BID EXTERNALLY 1/27/17  Yes Provider, Historical  
Insulin Needles, Disposable, (CELSO PEN NEEDLE) 32 gauge x 5/32\" ndle Use 4 times daily. Dx code E11.65 11/5/15  Yes Antonio Bob MD  
Lancets (ONE TOUCH DELICA) misc Test 4 times daily. DX CODE 250.00 1/5/15  Yes Antonio Bob MD  
insulin syringe-needle U-100 (INSULIN SYRINGE ULTRA-FINE) 1 mL 31 x 15/64\" syrg 1 Syringe by Does Not Apply route daily.  Use once daily 14  Yes Layo Adams MD  
loratadine (CLARITIN) 10 mg tablet Take 10 mg by mouth daily. Yes Provider, Historical  
hydrALAZINE (APRESOLINE) 25 mg tablet Take 1 Tab by mouth three (3) times daily. 13  Yes Layo Adams MD  
MULTIVITS,CA,MINERALS/IRON/FA (ONE-A-DAY WOMENS FORMULA PO) Take  by mouth. Yes Provider, Historical  
clopidogreL (Plavix) 75 mg tab Take 75 mg by mouth daily. Provider, Historical  
 
 
REVIEW OF SYSTEMS:    
 
Constitutional: Negative for chills and fever. HENT: Negative. Eyes: Negative. Respiratory: Negative. Cardiovascular: Negative. Gastrointestinal: Negative for abdominal pain and nausea. Skin: Negative. Neurological: Negative. Objective: VITALS:   
Visit Vitals BP (!) 150/68 Pulse 76 Temp 98.6 °F (37 °C) Resp 15 Ht 5' 3\" (1.6 m) Wt 95.3 kg (210 lb) SpO2 99% BMI 37.20 kg/m² Temp (24hrs), Av.4 °F (38 °C), Min:98.6 °F (37 °C), Max:103.4 °F (39.7 °C) PHYSICAL EXAM:  
General:    Alert, cooperative, no distress, appears stated age. HEENT: Atraumatic, anicteric sclerae, pink conjunctivae No oral ulcers, mucosa moist, throat clear Neck:  Supple, symmetrical,  thyroid: non tender Lungs:   Clear to auscultation bilaterally. No Wheezing or Rhonchi. No rales. Chest wall:  No tenderness  No Accessory muscle use. Heart:   Regular  rhythm,  No  murmur   No edema Abdomen:   Soft, non-tender. Not distended. Bowel sounds normal 
Extremities: No cyanosis. No clubbing. Left arm AV graft with no visible erythema over the skin. However a scab seen from the bleeding site Skin:     Not pale. Not Jaundiced  No rashes Psych:  Good insight. Not depressed. Not anxious or agitated. Neurologic: EOMs intact. No facial asymmetry. No aphasia or slurred speech. Symmetrical strength, Alert and oriented X 4. LAB DATA REVIEWED:   
Recent Results (from the past 24 hour(s)) CULTURE, BLOOD  Collection Time: 21 4:00 PM  
 Specimen: Blood Result Value Ref Range Special Requests: No Special Requests Culture result:     
  Two of two bottles have been flagged positive by instrument. Bottles have been sent to [facility] laboratory to assess for possible growth. Gram Positive Cocci in clusters CALLED TO AND READ BACK BY MS Corby Jara R.N. 1000 02/25/21 LACTIC ACID Collection Time: 02/24/21  4:00 PM  
Result Value Ref Range Lactic acid 3.2 (HH) 0.4 - 2.0 mmol/L METABOLIC PANEL, COMPREHENSIVE Collection Time: 02/24/21  4:00 PM  
Result Value Ref Range Sodium 135 (L) 136 - 145 mmol/L Potassium 4.3 3.5 - 5.1 mmol/L Chloride 100 97 - 108 mmol/L  
 CO2 25 21 - 32 mmol/L Anion gap 10 5 - 15 mmol/L Glucose 252 (H) 65 - 100 mg/dL BUN 33 (H) 6 - 20 mg/dL Creatinine 4.31 (H) 0.55 - 1.02 mg/dL BUN/Creatinine ratio 8 (L) 12 - 20 GFR est AA 12 (L) >60 ml/min/1.73m2 GFR est non-AA 10 (L) >60 ml/min/1.73m2 Calcium 9.6 8.5 - 10.1 mg/dL Bilirubin, total 0.3 0.2 - 1.0 mg/dL AST (SGOT) 14 (L) 15 - 37 U/L  
 ALT (SGPT) 19 12 - 78 U/L Alk. phosphatase 166 (H) 45 - 117 U/L Protein, total 7.8 6.4 - 8.2 g/dL Albumin 3.7 3.5 - 5.0 g/dL Globulin 4.1 (H) 2.0 - 4.0 g/dL A-G Ratio 0.9 (L) 1.1 - 2.2    
TROPONIN I Collection Time: 02/24/21  4:00 PM  
Result Value Ref Range Troponin-I, Qt. <0.05 <0.05 ng/mL CBC WITH AUTOMATED DIFF Collection Time: 02/24/21  6:00 PM  
Result Value Ref Range WBC 14.5 (H) 3.6 - 11.0 K/uL  
 RBC 3.26 (L) 3.80 - 5.20 M/uL HGB 9.9 (L) 11.5 - 16.0 g/dL HCT 31.2 (L) 35.0 - 47.0 % MCV 95.7 80.0 - 99.0 FL  
 MCH 30.4 26.0 - 34.0 PG  
 MCHC 31.7 30.0 - 36.5 g/dL  
 RDW 14.5 11.5 - 14.5 % PLATELET 828 (L) 947 - 400 K/uL MPV 13.5 (H) 8.9 - 12.9 FL  
 NRBC 0.0 0  WBC ABSOLUTE NRBC 0.00 0.00 - 0.01 K/uL NEUTROPHILS 88 (H) 32 - 75 % LYMPHOCYTES 8 (L) 12 - 49 % MONOCYTES 4 (L) 5 - 13 % EOSINOPHILS 0 0 - 7 %  BASOPHILS 0 0 - 1 % IMMATURE GRANULOCYTES 0 0.0 - 0.5 % ABS. NEUTROPHILS 12.7 (H) 1.8 - 8.0 K/UL  
 ABS. LYMPHOCYTES 1.2 0.8 - 3.5 K/UL  
 ABS. MONOCYTES 0.6 0.0 - 1.0 K/UL  
 ABS. EOSINOPHILS 0.0 0.0 - 0.4 K/UL  
 ABS. BASOPHILS 0.0 0.0 - 0.1 K/UL  
 ABS. IMM. GRANS. 0.0 0.00 - 0.04 K/UL  
 DF AUTOMATED    
SARS-COV-2 Collection Time: 02/24/21  8:18 PM  
Result Value Ref Range SARS-CoV-2 Please find results under separate order COVID-19 RAPID TEST Collection Time: 02/24/21  8:18 PM  
Result Value Ref Range Specimen source Nasopharyngeal    
 COVID-19 rapid test Not Detected Not Detected GLUCOSE, POC Collection Time: 02/24/21  9:47 PM  
Result Value Ref Range Glucose (POC) 238 (H) 65 - 100 mg/dL Performed by [address], COMPREHENSIVE Collection Time: 02/25/21  4:20 AM  
Result Value Ref Range Sodium 136 136 - 145 mmol/L Potassium 3.7 3.5 - 5.1 mmol/L Chloride 104 97 - 108 mmol/L  
 CO2 24 21 - 32 mmol/L Anion gap 8 5 - 15 mmol/L Glucose 227 (H) 65 - 100 mg/dL BUN 38 (H) 6 - 20 mg/dL Creatinine 4.78 (H) 0.55 - 1.02 mg/dL BUN/Creatinine ratio 8 (L) 12 - 20 GFR est AA 11 (L) >60 ml/min/1.73m2 GFR est non-AA 9 (L) >60 ml/min/1.73m2 Calcium 8.9 8.5 - 10.1 mg/dL Bilirubin, total 0.4 0.2 - 1.0 mg/dL AST (SGOT) 17 15 - 37 U/L  
 ALT (SGPT) 15 12 - 78 U/L Alk. phosphatase 127 (H) 45 - 117 U/L Protein, total 6.9 6.4 - 8.2 g/dL Albumin 3.1 (L) 3.5 - 5.0 g/dL Globulin 3.8 2.0 - 4.0 g/dL A-G Ratio 0.8 (L) 1.1 - 2.2    
CBC WITH AUTOMATED DIFF Collection Time: 02/25/21  4:20 AM  
Result Value Ref Range WBC 14.0 (H) 3.6 - 11.0 K/uL  
 RBC 3.00 (L) 3.80 - 5.20 M/uL HGB 9.0 (L) 11.5 - 16.0 g/dL HCT 28.9 (L) 35.0 - 47.0 % MCV 96.3 80.0 - 99.0 FL  
 MCH 30.0 26.0 - 34.0 PG  
 MCHC 31.1 30.0 - 36.5 g/dL  
 RDW 14.8 (H) 11.5 - 14.5 % PLATELET 774 (L) 059 - 400 K/uL  MPV 13.1 (H) 8.9 - 12.9 FL  
 NEUTROPHILS 86 (H) 32 - 75 % LYMPHOCYTES 6 (L) 12 - 49 % MONOCYTES 8 5 - 13 % EOSINOPHILS 0 0 - 7 % BASOPHILS 0 0 - 1 % IMMATURE GRANULOCYTES 0 0.0 - 0.5 % ABS. NEUTROPHILS 12.1 (H) 1.8 - 8.0 K/UL  
 ABS. LYMPHOCYTES 0.8 0.8 - 3.5 K/UL  
 ABS. MONOCYTES 1.1 (H) 0.0 - 1.0 K/UL  
 ABS. EOSINOPHILS 0.0 0.0 - 0.4 K/UL  
 ABS. BASOPHILS 0.0 0.0 - 0.1 K/UL  
 ABS. IMM. GRANS. 0.1 (H) 0.00 - 0.04 K/UL  
 DF AUTOMATED    
GLUCOSE, POC Collection Time: 02/25/21  8:50 AM  
Result Value Ref Range Glucose (POC) 267 (H) 65 - 100 mg/dL Performed by Clotilde Ventura GLUCOSE, POC Collection Time: 02/25/21 11:32 AM  
Result Value Ref Range Glucose (POC) 259 (H) 65 - 100 mg/dL Performed by Clotilde Ventura Recommendations/Plan:  
#1 ESRD 
-She goes for dialysis at [facility] every TTS 
-Last dialyzed outpatient on Tuesday per schedule 
-We will plan for dialysis today for 3.15 hours with 2/2.5 bath 
-Left arm AV graft is current access 
-No uremic or volume overload symptoms 
-We will try to remove about 2 L of fluid 
-Continue TTS schedule in the hospital 
 
#2 renal osteodystrophy 
-Not noted to be on any phosphorus binders. We will check phosphorus with next labs. Obtain PTH from outpatient and continue Zemplar if indicated 
-Calcium is okay #3 anemia 
-hemoglobin is 9.9. Anemia of chronic kidney disease 
-Continue Procrit with dialysis #4 sepsis 
-Presented with fever over 103, leukocytosis and elevated lactic acid 
-Positive blood cultures with gram-positive cocci in clusters consistent with staph 
-Possible source is infected left arm AV graft 
-ID has been consulted. Continue IV vancomycin 
-TTE has been ordered to rule out vegetations 
-Will probably need long-term antibiotics at least for 6 weeks can be given on dialysis #5 hypertension 
-Blood pressure is well controlled with amlodipine and hydralazine which I will continue Thank you for the consultation ________________________________________________________________________ Signed: Roxy College, MD

## 2021-02-25 NOTE — ROUTINE PROCESS
TRANSFER - OUT REPORT: 
 
Verbal report given to Na Alarcon RN on [facility]  being transferred to [facility] [phone] for routine progression of care Report consisted of patients Situation, Background, Assessment and  
Recommendations(SBAR). Information from the following report(s) SBAR and ED Summary was reviewed with the receiving nurse. Lines:  
Peripheral IV 02/24/21 Anterior;Right External jugular (Active) Opportunity for questions and clarification was provided. Patient transported with: 
 Monitor Tech

## 2021-02-25 NOTE — ROUTINE PROCESS
TRANSFER - OUT REPORT: 
 
Verbal report given to kris rn(name) on Caitlin Michael  being transferred to [address] [place] [address](unit) for routine progression of care Report consisted of patients Situation, Background, Assessment and  
Recommendations(SBAR). Information from the following report(s) ED Summary was reviewed with the receiving nurse. Lines:  
Peripheral IV 02/24/21 Anterior;Right External jugular (Active) Opportunity for questions and clarification was provided. Patient transported with: 
 Monitor Tech

## 2021-02-25 NOTE — CONSULTS
Consult Date: 2/25/2021 Consults:  Sepsis Subjective This is a 77year old female, diabetic with ESRD who presented to the ED yesterday because bleeding associated with with AV fistula. She was noted to be febrile to103.4 and WBC 14,000 with elevated lactic acid. Blood cultures sent and are already growing GPC in clusters. Patient started on Vancomycin and Zosyn. ID has been consulted for this reason. CXR was unremarkable. Patient resting comfortably at this time and affirms bleeding from her graft and reports passing a \"clot\" as well. She also endorsed fever and chills. Past Medical History:  
Diagnosis Date  CAD (coronary artery disease) stents x 2  Chronic kidney disease   
 t-th-s/ renal care in [facility] hghts  Diabetes ([facility] [address] type 2  
 Hypercholesterolemia  Hypertension  Morbid obesity ([facility] [address] bmi 35  Renal insufficiency Past Surgical History:  
Procedure Laterality Date  COLONOSCOPY  EGD  HX APPENDECTOMY  2019  HX GASTRIC BYPASS  2009  
 gastric band  HX ORTHOPAEDIC Bilateral   
 ctr  HX VASCULAR ACCESS Left 2020  
 lorenzo cath for dialysis  [place] CARDIAC SURG PROCEDURE UNLIST 2 cardiac stents  [place] LAP, PLACE ADJUST GASTR BAND  7/29/10 Realize-C  
 [place] NEEDLE BIOPSY LIVER,W OTHR PROC  7/29/10 [address] [facility]  7/29/10 Family History Problem Relation Age of Onset  Heart Disease Mother  Diabetes Mother  Kidney Disease Mother [employer] Other Mother  Cancer Father  Emphysema Father  Asthma Sister  Heart Disease Sister  Diabetes Child Social History Tobacco Use  Smoking status: Never Smoker  Smokeless tobacco: Never Used Substance Use Topics  Alcohol use: No  
   
Current Facility-Administered Medications Medication Dose Route Frequency Provider Last Rate Last Admin  sodium chloride (NS) flush 5-10 mL  5-10 mL IntraVENous PRN Yamileth Tesfaye Leeann Alves MD      
 amLODIPine-benazepril (LOTREL) 2.5-10 mg per capsule 4 Cap  4 Cap Oral DAILY Ab Alvarenga MD      
 aspirin delayed-release tablet 81 mg  81 mg Oral DAILY Ab Alvarenga MD   81 mg at 02/25/21 1338  atenoloL (TENORMIN) tablet 50 mg  50 mg Oral DAILY Melani Sapp MD   50 mg at 02/25/21 7493  clopidogreL (PLAVIX) tablet 75 mg  75 mg Oral DAILY Melani Sapp MD      
 hydrALAZINE (APRESOLINE) tablet 25 mg  25 mg Oral TID Ab Alvarenga MD   25 mg at 02/25/21 2151  . PHARMACY TO SUBSTITUTE PER PROTOCOL (Reordered from: insulin glargine (Lantus Solostar U-100 Insulin) 100 unit/mL (3 mL) inpn)    Per Protocol Ab Alvarenga MD      
 loratadine (CLARITIN) tablet 10 mg  10 mg Oral DAILY Martin Sapp MD   10 mg at 02/25/21 3747  
 multivitamin, tx-iron-ca-min (THERA-M w/ IRON) tablet 1 Tab  1 Tab Oral DAILY Martin Sapp MD   1 Tab at 02/25/21 3970  atorvastatin (LIPITOR) tablet 20 mg  20 mg Oral QHS Melani Sapp MD   Stopped at 02/24/21 2200  
 glucose chewable tablet 16 g  4 Tab Oral PRN Ab Alvarenga MD      
 dextrose (D50W) injection syrg 12.5-25 g  25-50 mL IntraVENous PRN Ab Alvarenga MD      
 glucagon [facility]) injection 1 mg  1 mg IntraMUSCular PRN Ab Alvarenga MD      
 insulin lispro (HUMALOG) injection   SubCUTAneous AC&HS Ab Alvarenga MD   5 Units at 02/25/21 4877  sodium chloride (NS) flush 5-40 mL  5-40 mL IntraVENous Marixa Watson MD   10 mL at 02/24/21 2208  sodium chloride (NS) flush 5-40 mL  5-40 mL IntraVENous PRN Ab Alvarenga MD      
 acetaminophen (TYLENOL) tablet 650 mg  650 mg Oral Q6H PRN Ab Alvarenga MD   650 mg at 02/25/21 4121 Or  acetaminophen (TYLENOL) suppository 650 mg  650 mg Rectal Q6H PRN Ever Castillo Parmjit Sin MD      
 polyethylene glycol [facility]) packet 17 g  17 g Oral DAILY PRN Melani Stark MD      
 [facility]) tablet 12.5 mg  12.5 mg Oral Q6H PRN Ab Alvarenga MD      
 Or  
 [facility]ron [facility]) injection 4 mg  4 mg IntraVENous Q6H PRN Ab Alvarenga MD      
 heparin (porcine) injection 5,000 Units  5,000 Units SubCUTAneous Q12H Ab Alvarenga MD   5,000 Units at 02/24/21 5453  VANCOMYCIN INFORMATION NOTE   Other Rx Dosing/Monitoring Ab Alvarenga MD      
 Vancomycin random only level scheduled for 1800 on 2/25/2021   Other Dahlia Deal, Melani Sin MD      
 piperacillin-tazobactam (ZOSYN) 3.375 g in 0.9% sodium chloride (MBP/ADV) 100 mL MBP  3.375 g IntraVENous Q12H Ab Alvarenga MD 25 mL/hr at 02/25/21 0930 3.375 g at 02/25/21 0930 Review of Systems Constitutional: Positive for chills and fever. Negative for fatigue. HENT: Negative. Eyes: Negative. Respiratory: Negative. Cardiovascular: Negative. Gastrointestinal: Negative. Endocrine: Negative. Genitourinary: Negative. Musculoskeletal: Negative. Skin: Negative. Allergic/Immunologic: Negative. Neurological: Negative. Hematological: Bruises/bleeds easily. Psychiatric/Behavioral: Negative. Objective Vital signs for last 24 hours: 
Visit Vitals BP (!) 150/68 Pulse 76 Temp 98.6 °F (37 °C) Resp (!) 5 Ht 5' 3\" (1.6 m) Wt 210 lb (95.3 kg) SpO2 99% BMI 37.20 kg/m² Intake/Output this shift: 
Current Shift: No intake/output data recorded. Last 3 Shifts: 02/23 1901 - 02/25 0700 In: 650 [I.V.:650] Out: -  
 
Data Review:  
Recent Results (from the past 24 hour(s)) CULTURE, BLOOD Collection Time: 02/24/21  4:00 PM  
 Specimen: Blood Result Value Ref Range Special Requests: No Special Requests  Culture result:     
  Two of two bottles have been flagged positive by instrument. Bottles have been sent to [facility] laboratory to assess for possible growth. Gram Positive Cocci in clusters CALLED TO AND READ BACK BY MS Corby Jara R.N. 1000 02/25/21 LACTIC ACID Collection Time: 02/24/21  4:00 PM  
Result Value Ref Range Lactic acid 3.2 (HH) 0.4 - 2.0 mmol/L METABOLIC PANEL, COMPREHENSIVE Collection Time: 02/24/21  4:00 PM  
Result Value Ref Range Sodium 135 (L) 136 - 145 mmol/L Potassium 4.3 3.5 - 5.1 mmol/L Chloride 100 97 - 108 mmol/L  
 CO2 25 21 - 32 mmol/L Anion gap 10 5 - 15 mmol/L Glucose 252 (H) 65 - 100 mg/dL BUN 33 (H) 6 - 20 mg/dL Creatinine 4.31 (H) 0.55 - 1.02 mg/dL BUN/Creatinine ratio 8 (L) 12 - 20 GFR est AA 12 (L) >60 ml/min/1.73m2 GFR est non-AA 10 (L) >60 ml/min/1.73m2 Calcium 9.6 8.5 - 10.1 mg/dL Bilirubin, total 0.3 0.2 - 1.0 mg/dL AST (SGOT) 14 (L) 15 - 37 U/L  
 ALT (SGPT) 19 12 - 78 U/L Alk. phosphatase 166 (H) 45 - 117 U/L Protein, total 7.8 6.4 - 8.2 g/dL Albumin 3.7 3.5 - 5.0 g/dL Globulin 4.1 (H) 2.0 - 4.0 g/dL A-G Ratio 0.9 (L) 1.1 - 2.2    
TROPONIN I Collection Time: 02/24/21  4:00 PM  
Result Value Ref Range Troponin-I, Qt. <0.05 <0.05 ng/mL CBC WITH AUTOMATED DIFF Collection Time: 02/24/21  6:00 PM  
Result Value Ref Range WBC 14.5 (H) 3.6 - 11.0 K/uL  
 RBC 3.26 (L) 3.80 - 5.20 M/uL HGB 9.9 (L) 11.5 - 16.0 g/dL HCT 31.2 (L) 35.0 - 47.0 % MCV 95.7 80.0 - 99.0 FL  
 MCH 30.4 26.0 - 34.0 PG  
 MCHC 31.7 30.0 - 36.5 g/dL  
 RDW 14.5 11.5 - 14.5 % PLATELET 594 (L) 456 - 400 K/uL MPV 13.5 (H) 8.9 - 12.9 FL  
 NRBC 0.0 0  WBC ABSOLUTE NRBC 0.00 0.00 - 0.01 K/uL NEUTROPHILS 88 (H) 32 - 75 % LYMPHOCYTES 8 (L) 12 - 49 % MONOCYTES 4 (L) 5 - 13 % EOSINOPHILS 0 0 - 7 % BASOPHILS 0 0 - 1 % IMMATURE GRANULOCYTES 0 0.0 - 0.5 % ABS. NEUTROPHILS 12.7 (H) 1.8 - 8.0 K/UL  
 ABS. LYMPHOCYTES 1.2 0.8 - 3.5 K/UL  
 ABS.  MONOCYTES 0.6 0.0 - 1.0 K/UL  
 ABS. EOSINOPHILS 0.0 0.0 - 0.4 K/UL  
 ABS. BASOPHILS 0.0 0.0 - 0.1 K/UL  
 ABS. IMM. GRANS. 0.0 0.00 - 0.04 K/UL  
 DF AUTOMATED    
SARS-COV-2 Collection Time: 02/24/21  8:18 PM  
Result Value Ref Range SARS-CoV-2 Please find results under separate order COVID-19 RAPID TEST Collection Time: 02/24/21  8:18 PM  
Result Value Ref Range Specimen source Nasopharyngeal    
 COVID-19 rapid test Not Detected Not Detected GLUCOSE, POC Collection Time: 02/24/21  9:47 PM  
Result Value Ref Range Glucose (POC) 238 (H) 65 - 100 mg/dL Performed by [address], COMPREHENSIVE Collection Time: 02/25/21  4:20 AM  
Result Value Ref Range Sodium 136 136 - 145 mmol/L Potassium 3.7 3.5 - 5.1 mmol/L Chloride 104 97 - 108 mmol/L  
 CO2 24 21 - 32 mmol/L Anion gap 8 5 - 15 mmol/L Glucose 227 (H) 65 - 100 mg/dL BUN 38 (H) 6 - 20 mg/dL Creatinine 4.78 (H) 0.55 - 1.02 mg/dL BUN/Creatinine ratio 8 (L) 12 - 20 GFR est AA 11 (L) >60 ml/min/1.73m2 GFR est non-AA 9 (L) >60 ml/min/1.73m2 Calcium 8.9 8.5 - 10.1 mg/dL Bilirubin, total 0.4 0.2 - 1.0 mg/dL AST (SGOT) 17 15 - 37 U/L  
 ALT (SGPT) 15 12 - 78 U/L Alk. phosphatase 127 (H) 45 - 117 U/L Protein, total 6.9 6.4 - 8.2 g/dL Albumin 3.1 (L) 3.5 - 5.0 g/dL Globulin 3.8 2.0 - 4.0 g/dL A-G Ratio 0.8 (L) 1.1 - 2.2    
CBC WITH AUTOMATED DIFF Collection Time: 02/25/21  4:20 AM  
Result Value Ref Range WBC 14.0 (H) 3.6 - 11.0 K/uL  
 RBC 3.00 (L) 3.80 - 5.20 M/uL HGB 9.0 (L) 11.5 - 16.0 g/dL HCT 28.9 (L) 35.0 - 47.0 % MCV 96.3 80.0 - 99.0 FL  
 MCH 30.0 26.0 - 34.0 PG  
 MCHC 31.1 30.0 - 36.5 g/dL  
 RDW 14.8 (H) 11.5 - 14.5 % PLATELET 309 (L) 237 - 400 K/uL MPV 13.1 (H) 8.9 - 12.9 FL  
 NEUTROPHILS 86 (H) 32 - 75 % LYMPHOCYTES 6 (L) 12 - 49 % MONOCYTES 8 5 - 13 % EOSINOPHILS 0 0 - 7 % BASOPHILS 0 0 - 1 % IMMATURE GRANULOCYTES 0 0.0 - 0.5 % ABS. NEUTROPHILS 12.1 (H) 1.8 - 8.0 K/UL  
 ABS. LYMPHOCYTES 0.8 0.8 - 3.5 K/UL  
 ABS. MONOCYTES 1.1 (H) 0.0 - 1.0 K/UL  
 ABS. EOSINOPHILS 0.0 0.0 - 0.4 K/UL  
 ABS. BASOPHILS 0.0 0.0 - 0.1 K/UL  
 ABS. IMM. GRANS. 0.1 (H) 0.00 - 0.04 K/UL  
 DF AUTOMATED    
GLUCOSE, POC Collection Time: 02/25/21  8:50 AM  
Result Value Ref Range Glucose (POC) 267 (H) 65 - 100 mg/dL Performed by Tania Simms Physical Exam 
Constitutional:   
   General: She is not in acute distress. Appearance: She is obese. She is ill-appearing. HENT:  
   Head: Normocephalic and atraumatic. Nose: Nose normal.  
   Mouth/Throat:  
   Pharynx: Oropharynx is clear. Eyes:  
   Pupils: Pupils are equal, round, and reactive to light. Neck: Musculoskeletal: Neck supple. Cardiovascular:  
   Rate and Rhythm: Normal rate and regular rhythm. Heart sounds: No murmur. Pulmonary:  
   Breath sounds: No wheezing, rhonchi or rales. Abdominal:  
   Palpations: Abdomen is soft. Tenderness: There is no abdominal tenderness. There is no guarding. Genitourinary: 
   Comments: Delroy Guerrero Musculoskeletal:  
   Right lower leg: No edema. Left lower leg: No edema. Comments: Left upper arm graft without tenderness or erythema, no drainage Skin: 
   Findings: No rash. Neurological:  
   General: No focal deficit present. Mental Status: She is alert and oriented to person, place, and time. Psychiatric:     
   Mood and Affect: Mood normal.     
   Behavior: Behavior normal.     
   Thought Content: Thought content normal.     
   Judgment: Judgment normal.  
 
 
ASSESSMENT/PLAN 1. Sepsis with fever, leukocytosis and elevated lactic acid 2. Positive blood cultures with Gram positive cocci in clusters consistent with Staphylococci 3. Probable infected AV graft left arm 4. ESRD on HD 5. Covid-19 negative 1. Continue IV Vancomycin 2. Discontinue IV Zosyn 3. Follow-up blood cultures 4. Order TTE to rule out vegetations 5. In am, repeat CBC, check procal and CRP, repeat blood cultures Markos Palma MD

## 2021-02-25 NOTE — PROGRESS NOTES
Hospitalist Progress Note 
 
NAME: Caitiln Michael  
:  1955  
MRN:  [id number]  
 
 
Subjective:  
 
Chief Complaint / Reason for Physician Visit 
Patient seen and evaluated at bedside, of note feels slightly better at this time.  Discussed with RN events overnight.  
 
Review of Systems: 
Symptom Y/N Comments  Symptom Y/N Comments  
Fever/Chills Y   Chest Pain N   
Poor Appetite N   Edema N   
Cough N   Abdominal Pain N   
Sputum N   Joint Pain N   
SOB/RIGGS N   Pruritis/Rash N   
Nausea/vomit N   Tolerating PT/OT NA   
Diarrhea N   Tolerating Diet Y   
Constipation N   Other    
 
Could NOT obtain due to:   
Patient denies any nausea vomiting lightheadedness dizziness dyspnea orthopnea paroxysmal nocturnal dyspnea chest pain palpitations headache focal weakness loss of sensation auditory or visual symptoms abdominal stool or urinary complaints or any other associated symptoms. 
Objective:  
 
VITALS:  
Last 24hrs VS reviewed since prior progress note. Most recent are: 
Patient Vitals for the past 24 hrs: 
 Temp Pulse Resp BP SpO2  
21 0844 98.6 °F (37 °C) 76 15 (!) 150/68 99 %  
21 0748 — 76 20 (!) 118/53 98 %  
21 0430 99.9 °F (37.7 °C) — — — —  
21 0400 — 83 16 (!) 183/64 99 %  
21 0000 — 89 21 (!) 144/57 100 %  
21 2300 — 87 24 (!) 169/67 100 %  
21 2204 — 85 — (!) 152/62 —  
21 2113 99.4 °F (37.4 °C) — — — —  
21 — 90 24 (!) 131/44 98 %  
21 (!) 100.5 °F (38.1 °C) — — — —  
21 194 — 93 27 (!) 143/63 98 %  
21 1853 (!) 100.8 °F (38.2 °C) 96 22 (!) 144/70 100 %  
21 1426 (!) 103.4 °F (39.7 °C) 100 20 (!) 148/71 97 %  
 
 
Intake/Output Summary (Last 24 hours) at 2021 1321 
Last data filed at 2021 0928 
Gross per 24 hour  
Intake 890 ml  
Output —  
Net 890 ml  
  
 
PHYSICAL EXAM: 
General: Patient appears comfortable  
EENT:  EOMI. Anicteric sclerae. MMM 
Resp:  CTA bilaterally, no wheezing or rales.  No  accessory muscle use CV:  Regular  rhythm,  No edema GI:  Soft, Non distended, Non tender. +Bowel sounds Neurologic:  Alert and oriented X 3, normal speech, Psych:   Good insight. Not anxious nor agitated Skin:  No rashes. No jaundice Procedures: see electronic medical records for all procedures/Xrays and details which were not copied into this note but were reviewed prior to creation of Plan. LABS: 
I reviewed today's most current labs and imaging studies. Pertinent labs include: 
Recent Labs  
  02/25/21 
0420 02/24/21 
1800 WBC 14.0* 14.5* HGB 9.0* 9.9*  
HCT 28.9* 31.2*  
* 118* Recent Labs  
  02/25/21 
0420 02/24/21 
1600  135* K 3.7 4.3  100 CO2 24 25 * 252* BUN 38* 33* CREA 4.78* 4.31* CA 8.9 9.6 ALB 3.1* 3.7 TBILI 0.4 0.3 ALT 15 19 Signed: Zion Zimmer MD 
 
Reviewed most current lab test results and cultures  YES Reviewed most current radiology test results   YES Review and summation of old records today    NO Reviewed patient's current orders and MAR    YES 
PMH/ reviewed - no change compared to H&P 
______________________________________ Assessment/Plan: 
 
Sepsis 2/2 gram positive bacteremiapatient presents with above-mentioned symptomatology found to meet sepsis criteria given the fact the patient was febrile with evidence of leukocytosis however with gram positive bacteremia, of note patient cannot receive fluid resuscitation as per sepsis protocol secondary to being end-stage renal disease on hemodialysis We will administer normal saline bolus 500 cc x 1 Follow-up blood cultures Follow up TTE Continue Vancomycin  For antibiotic coverage Infectious disease consult appreciated, will continue to follow 
  
End-stage renal disease on hemodialysispatient has a history of end-stage renal disease on hemodialysis with concerns of clotting of fistula Nephrology consult appreciated, will continue to follow 
  
Hyperglycemia due to type 2 diabetescontinue insulin sliding scale at this time, recalculate insulin requirement according to 24-hour coverage 
  
Hypertensioncontinue home antihypertensive medications 
  
Hyperlipidemiacontinue statin 
  
ProphylaxisHeparin subcu FENrenal diet, potassium and magnesium to be repleted with dialysis Full code, Disposition- Pending clinical improvement 
__________________________________ Care Plan discussed with: 
  Comments Patient X Family  x   
RN X   
Care Manager X Consultant  X   
                 X Multidiciplinary team rounds were held today with , nursing, pharmacist and clinical coordinator. Patient's plan of care was discussed; medications were reviewed and discharge planning was addressed. ________________________________________________________________________ Total NON critical care TIME:  35   Minutes Comments >50% of visit spent in counseling and coordination of care X   
________________________________________________________________________ Lesly Lopez MD

## 2021-02-25 NOTE — H&P
GENERAL GENERIC H&P/CONSULT Presenting complaint: Fever Subjective: 78-year-old female with past medical history of multiple comorbidities including end-stage renal disease on hemodialysis presents to [facility] with a fever. Patient states she was in her usual state of health until earlier this a.m. when she start experiencing gradual onset persistent progressive weakness as well as a feeling of being unwell subsequently which patient developed a fever and presented to the ED. Patient denies any chills nausea vomiting lightheadedness dizziness dyspnea orthopnea paroxysmal nocturnal dyspnea chest pain palpitations headache focal weakness loss sensation auditory or visual symptoms abdominal stool or urinary complaints or any other associated symptoms. Patient endorses no recent sick contacts or travel activity Past Medical History:  
Diagnosis Date  CAD (coronary artery disease) stents x 2  Chronic kidney disease   
 t-th-s/ renal care in [facility]  Diabetes ([facility] [address] type 2  
 Hypercholesterolemia  Hypertension  Morbid obesity ([facility] [address] bmi 35  Renal insufficiency Past Surgical History:  
Procedure Laterality Date  COLONOSCOPY  EGD  HX APPENDECTOMY  2019  HX GASTRIC BYPASS  2009  
 gastric band  HX ORTHOPAEDIC Bilateral   
 ctr  HX VASCULAR ACCESS Left 2020  
 lorenzo cath for dialysis  MT CARDIAC SURG PROCEDURE UNLIST 2 cardiac stents  MT LAP, PLACE ADJUST GASTR BAND  7/29/10 [facility]  
 MT NEEDLE BIOPSY LIVER,W OTHR PROC  7/29/10 [address] [facility]  7/29/10 Prior to Admission medications Medication Sig Start Date End Date Taking? Authorizing Provider  
insulin glargine (Lantus Solostar U-100 Insulin) 100 unit/mL (3 mL) inpn Decrease to 14 units after 11 am daily 10/9/20  Yes Chaka Pena MD  
aspirin delayed-release 81 mg tablet Take 81 mg by mouth daily.    Yes Provider, Historical clopidogreL (Plavix) 75 mg tab Take 75 mg by mouth daily. Yes Provider, Historical  
atenoloL (TENORMIN) 50 mg tablet Take 1 Tab by mouth daily. Stop 100 mg dose Patient taking differently: Take 50 mg by mouth daily. Stop 100 mg dose Takes pm 6/8/20  Yes Stepan Marie MD  
rosuvastatin (CRESTOR) 10 mg tablet TAKE 1 TABLET NIGHTLY 6/8/20  Yes Stepan Marie MD  
amLODIPine-benazepril (LOTREL) 10-40 mg per capsule TAKE 1 CAPSULE DAILY 5/19/19  Yes Stepan Marie MD  
glucose blood VI test strips (ONETOUCH ULTRA TEST) strip Test 4 times daily. DX CODE E11.65 12/4/18  Yes Stepan Marie MD  
flash glucose scanning reader (FREESTYLE BENEDICT 10 DAY READER) misc Use as directed 10/18/18  Yes Stepan Marie MD  
flash glucose sensor (FREESTYLE BENEDICT 10 DAY SENSOR) kit Use it every 10 days 10/18/18  Yes Stepan Marie MD  
Blood-Glucose Meter (ONETOUCH ULTRAMINI) monitoring kit Dispense 1 meter. Test 4 times daily. Dx code E11.65 6/22/18  Yes Stepan Marie MD  
NYSTOP powder CHANDRAKANT UTD BID EXTERNALLY 1/27/17  Yes Provider, Historical  
Insulin Needles, Disposable, (CELSO PEN NEEDLE) 32 gauge x 5/32\" ndle Use 4 times daily. Dx code E11.65 11/5/15  Yes Stepan Marie MD  
Lancets (ONE TOUCH DELICA) misc Test 4 times daily. DX CODE 250.00 1/5/15  Yes Stepan Marie MD  
insulin syringe-needle U-100 (INSULIN SYRINGE ULTRA-FINE) 1 mL 31 x 15/64\" syrg 1 Syringe by Does Not Apply route daily. Use once daily 1/24/14  Yes Stepan Marie MD  
loratadine (CLARITIN) 10 mg tablet Take 10 mg by mouth daily. Yes Provider, Historical  
hydrALAZINE (APRESOLINE) 25 mg tablet Take 1 Tab by mouth three (3) times daily. 8/8/13  Yes Stepan Marie MD  
MULTIVITS,CA,MINERALS/IRON/FA (ONE-A-DAY WOMENS FORMULA PO) Take  by mouth. Yes Provider, Historical  
 
No Known Allergies Social History Tobacco Use  Smoking status: Never Smoker  Smokeless tobacco: Never Used Substance Use Topics  Alcohol use: No  
  
Family History Problem Relation Age of Onset  Heart Disease Mother  Diabetes Mother  Kidney Disease Mother Floydene Ada Other Mother  Cancer Father  Emphysema Father  Asthma Sister  Heart Disease Sister  Diabetes Child Review of Systems Constitutional: Positive for activity change, appetite change, chills, fatigue and fever. Negative for diaphoresis and unexpected weight change. HENT: Negative for congestion, dental problem, drooling, ear discharge, ear pain, facial swelling, hearing loss, mouth sores, nosebleeds, postnasal drip, rhinorrhea, sinus pressure, sinus pain, sneezing, sore throat, tinnitus, trouble swallowing and voice change. Eyes: Negative for photophobia, pain, discharge, redness, itching and visual disturbance. Respiratory: Negative for apnea, cough, choking, chest tightness, shortness of breath, wheezing and stridor. Cardiovascular: Negative for chest pain, palpitations and leg swelling. Gastrointestinal: Negative for abdominal distention, abdominal pain, anal bleeding, blood in stool, constipation, diarrhea, nausea, rectal pain and vomiting. Endocrine: Negative for cold intolerance, heat intolerance, polydipsia, polyphagia and polyuria. Genitourinary: Negative for decreased urine volume, difficulty urinating, dyspareunia, dysuria, enuresis, flank pain, frequency, genital sores, hematuria, menstrual problem, pelvic pain, urgency, vaginal bleeding, vaginal discharge and vaginal pain. Musculoskeletal: Negative for arthralgias, back pain, gait problem, joint swelling, myalgias, neck pain and neck stiffness. Skin: Negative for color change, pallor, rash and wound. Allergic/Immunologic: Negative for environmental allergies, food allergies and immunocompromised state. Neurological: Positive for weakness. Negative for dizziness, tremors, seizures, syncope, facial asymmetry, speech difficulty, light-headedness, numbness and headaches.   
Hematological: Negative for adenopathy. Does not bruise/bleed easily. Psychiatric/Behavioral: Negative for agitation, behavioral problems, confusion, decreased concentration, dysphoric mood, hallucinations, self-injury, sleep disturbance and suicidal ideas. The patient is not nervous/anxious and is not hyperactive. Objective: No intake/output data recorded. No intake/output data recorded. Patient Vitals for the past 8 hrs: 
 BP Temp Pulse Resp SpO2 Height Weight  
02/24/21 2010 (!) 131/44  90 24 98 %    
02/24/21 2009  (!) 100.5 °F (38.1 °C)       
02/24/21 1941 (!) 143/63  93 27 98 %    
02/24/21 1853 (!) 144/70 (!) 100.8 °F (38.2 °C) 96 22 100 %    
02/24/21 1426 (!) 148/71 (!) 103.4 °F (39.7 °C) 100 20 97 % 5' 3\" (1.6 m) 95.3 kg (210 lb) Physical Exam 
Vitals signs reviewed. Constitutional:   
   General: She is not in acute distress. Appearance: Normal appearance. She is normal weight. She is ill-appearing. She is not toxic-appearing or diaphoretic. HENT:  
   Head: Normocephalic and atraumatic. Nose: Nose normal. No congestion or rhinorrhea. Mouth/Throat:  
   Mouth: Mucous membranes are moist.  
   Pharynx: Oropharynx is clear. No oropharyngeal exudate or posterior oropharyngeal erythema. Eyes:  
   General: No scleral icterus. Right eye: No discharge. Left eye: No discharge. Extraocular Movements: Extraocular movements intact. Conjunctiva/sclera: Conjunctivae normal.  
   Pupils: Pupils are equal, round, and reactive to light. Neck: Musculoskeletal: Normal range of motion and neck supple. No neck rigidity or muscular tenderness. Vascular: No carotid bruit. Cardiovascular:  
   Rate and Rhythm: Normal rate and regular rhythm. Pulses: Normal pulses. Heart sounds: Normal heart sounds. No murmur. No friction rub. No gallop. Pulmonary:  
   Effort: Pulmonary effort is normal. No respiratory distress.   
   Breath sounds: Normal breath sounds. No stridor. No wheezing, rhonchi or rales. Chest:  
   Chest wall: No tenderness. Abdominal:  
   General: Abdomen is flat. Bowel sounds are normal. There is no distension. Palpations: Abdomen is soft. There is no mass. Tenderness: There is no abdominal tenderness. There is no right CVA tenderness, left CVA tenderness, guarding or rebound. Hernia: No hernia is present. Musculoskeletal: Normal range of motion. General: No swelling, tenderness, deformity or signs of injury. Right lower leg: No edema. Left lower leg: No edema. Lymphadenopathy:  
   Cervical: No cervical adenopathy. Skin: 
   General: Skin is warm. Capillary Refill: Capillary refill takes less than 2 seconds. Coloration: Skin is not jaundiced or pale. Findings: No bruising, erythema, lesion or rash. Neurological:  
   General: No focal deficit present. Mental Status: She is alert and oriented to person, place, and time. Mental status is at baseline. Cranial Nerves: No cranial nerve deficit. Sensory: No sensory deficit. Motor: No weakness. Coordination: Coordination normal.  
   Gait: Gait normal.  
   Deep Tendon Reflexes: Reflexes normal.  
Psychiatric:     
   Mood and Affect: Mood normal.     
   Behavior: Behavior normal.     
   Thought Content: Thought content normal.     
   Judgment: Judgment normal.  
 
  
 
Labs:   
Recent Results (from the past 24 hour(s)) LACTIC ACID Collection Time: 02/24/21  4:00 PM  
Result Value Ref Range Lactic acid 3.2 (HH) 0.4 - 2.0 mmol/L METABOLIC PANEL, COMPREHENSIVE Collection Time: 02/24/21  4:00 PM  
Result Value Ref Range Sodium 135 (L) 136 - 145 mmol/L Potassium 4.3 3.5 - 5.1 mmol/L Chloride 100 97 - 108 mmol/L  
 CO2 25 21 - 32 mmol/L Anion gap 10 5 - 15 mmol/L Glucose 252 (H) 65 - 100 mg/dL BUN 33 (H) 6 - 20 mg/dL Creatinine 4.31 (H) 0.55 - 1.02 mg/dL  BUN/Creatinine ratio 8 (L) 12 - 20 GFR est AA 12 (L) >60 ml/min/1.73m2 GFR est non-AA 10 (L) >60 ml/min/1.73m2 Calcium 9.6 8.5 - 10.1 mg/dL Bilirubin, total 0.3 0.2 - 1.0 mg/dL AST (SGOT) 14 (L) 15 - 37 U/L  
 ALT (SGPT) 19 12 - 78 U/L Alk. phosphatase 166 (H) 45 - 117 U/L Protein, total 7.8 6.4 - 8.2 g/dL Albumin 3.7 3.5 - 5.0 g/dL Globulin 4.1 (H) 2.0 - 4.0 g/dL A-G Ratio 0.9 (L) 1.1 - 2.2    
TROPONIN I Collection Time: 02/24/21  4:00 PM  
Result Value Ref Range Troponin-I, Qt. <0.05 <0.05 ng/mL CBC WITH AUTOMATED DIFF Collection Time: 02/24/21  6:00 PM  
Result Value Ref Range WBC 14.5 (H) 3.6 - 11.0 K/uL  
 RBC 3.26 (L) 3.80 - 5.20 M/uL HGB 9.9 (L) 11.5 - 16.0 g/dL HCT 31.2 (L) 35.0 - 47.0 % MCV 95.7 80.0 - 99.0 FL  
 MCH 30.4 26.0 - 34.0 PG  
 MCHC 31.7 30.0 - 36.5 g/dL  
 RDW 14.5 11.5 - 14.5 % PLATELET 985 (L) 916 - 400 K/uL MPV 13.5 (H) 8.9 - 12.9 FL  
 NRBC 0.0 0  WBC ABSOLUTE NRBC 0.00 0.00 - 0.01 K/uL NEUTROPHILS 88 (H) 32 - 75 % LYMPHOCYTES 8 (L) 12 - 49 % MONOCYTES 4 (L) 5 - 13 % EOSINOPHILS 0 0 - 7 % BASOPHILS 0 0 - 1 % IMMATURE GRANULOCYTES 0 0.0 - 0.5 % ABS. NEUTROPHILS 12.7 (H) 1.8 - 8.0 K/UL  
 ABS. LYMPHOCYTES 1.2 0.8 - 3.5 K/UL  
 ABS. MONOCYTES 0.6 0.0 - 1.0 K/UL  
 ABS. EOSINOPHILS 0.0 0.0 - 0.4 K/UL  
 ABS. BASOPHILS 0.0 0.0 - 0.1 K/UL  
 ABS. IMM. GRANS. 0.0 0.00 - 0.04 K/UL  
 DF AUTOMATED    
SARS-COV-2 Collection Time: 02/24/21  8:18 PM  
Result Value Ref Range SARS-CoV-2 Please find results under separate order ECG: unchanged from previous tracings Chest X-Ray:IMPRESSION No acute abnormality seen. Assessment: 
Active Problems: 
  ESRD on dialysis ([facility] [address] (2/24/2021) [facility]) Plan: 
 
Sepsispatient presents with above-mentioned symptomatology found to meet sepsis criteria given the fact the patient was febrile with evidence of leukocytosis however unclear underlying source, of note patient cannot receive fluid resuscitation as per sepsis protocol secondary to being end-stage renal disease on hemodialysis We will administer normal saline bolus 500 cc x 1 Follow-up blood cultures Vancomycin and Zosyn for broad-spectrum antibiotic coverage Infectious disease consult further evaluation End-stage renal disease on hemodialysispatient has a history of end-stage renal disease on hemodialysis with concerns of clotting of fistula Obtain nephrology consult for further evaluation Hyperglycemia due to type 2 diabetescontinue insulin sliding scale at this time, recalculate insulin requirement according to 24-hour coverage Hypertensioncontinue home antihypertensive medications Hyperlipidemiacontinue statin ProphylaxisHeparin subcu FENrenal diet, potassium and magnesium to be repleted with dialysis Full code, will clarify about surrogate decision-maker, admitted to telemetry further management Signed: Sami Sheppard MD 2/24/2021

## 2021-02-25 NOTE — PROGRESS NOTES
Primary Nurse Jamaal Kenny RN and Kate Dunn RN performed a dual skin assessment on this patient No impairment noted Romaine score is 21. Patient skin intact. No open wounds noted. Dialysis fistula noted on left upper arm. Dressing clean, dry and intact.

## 2021-02-25 NOTE — PROGRESS NOTES
Consult for Vancomycin Dosing by Pharmacy by Dr. Telma Ward Consult provided for this 77y.o. year old female , for indication of sepsis/PNA/covid-19 Day of Therapy: 1 Goal of Level(s): 15-20mcg/dL Other Current Antibiotics:zosyn Results Procedure Component Value Units Date/Time CULTURE, BLOOD [[id number]] Collected: 02/24/21 1600 Order Status: Completed Specimen: Blood Updated: 02/24/21 1649 CULTURE, BLOOD [[id number]] Collected: 02/24/21 1600 Order Status: Completed Specimen: Blood Updated: 02/24/21 1648 Serum Creatinine Creatinine Date Value Ref Range Status 02/24/2021 4.31 (H) 0.55 - 1.02 mg/dL Final  
10/02/2020 4.29 (H) 0.57 - 1.00 mg/dL Final  
06/03/2020 4.98 (H) 0.57 - 1.00 mg/dL Final  
  
Creatinine Clearance Estimated Creatinine Clearance: 13.9 mL/min (A) (based on SCr of 4.31 mg/dL (H)). BUN Lab Results Component Value Date/Time BUN 33 (H) 02/24/2021 04:00 PM  
 BUN (POC) 34 (H) 09/02/2020 07:06 AM  
  
WBC Lab Results Component Value Date/Time WBC 14.5 (H) 02/24/2021 06:00 PM  
  
Temp (!) 100.5 °F (38.1 °C) Last Level: No results found for: VANCT No results found for: [facility] Ht Readings from Last 1 Encounters:  
02/24/21 160 cm (63\") Wt Readings from Last 1 Encounters:  
02/24/21 95.3 kg (210 lb) Ideal body weight: 52.4 kg (115 lb 8.3 oz) Adjusted ideal body weight: 69.5 kg (153 lb 5 oz) Previous Regimen: Vancomycin 1 gram iv ordered in ED by MD given at approx 1800 on 2/24/2021 New Regimen:  Patient is a dialysis patient and should be dialyzed on 2/25/2021 according to ED nurse but not sure of time. Pharmacy ordered an 1800 random only vancomycin level for 2/25/2021  since not sure what time pt will be dialyzed. May adjust level earlier if dialysis done early Pharmacy to follow daily and will make changes to dose and/or frequency based on clinical status. _________________________________ Pharmacist Ann Dorsey Abilio, [facility]

## 2021-02-25 NOTE — DIALYSIS
Pt aby 3.25 hour hemodialysis well. 1.7 liters net fluid removed. 2 sets paired blood cultures drawn and sent to the lab at the beginning of HD from the left upper arm dialysis AVG.

## 2021-02-26 NOTE — PROGRESS NOTES
Hospitalist Progress Note NAME: Kymberly Yuan :  1955 MRN:  [id number] Subjective: Chief Complaint / Reason for Physician Visit Patient seen and evaluated at bedside, of note feels slightly better at this time. Discussed with RN events overnight. Review of Systems: 
Symptom Y/N Comments  Symptom Y/N Comments Fever/Chills Y   Chest Pain N Poor Appetite N   Edema N   
Cough N   Abdominal Pain N Sputum N   Joint Pain N   
SOB/RIGGS N   Pruritis/Rash N   
Nausea/vomit N   Tolerating PT/OT NA Diarrhea N   Tolerating Diet Y Constipation N   Other Could NOT obtain due to:   
Patient denies any nausea vomiting lightheadedness dizziness dyspnea orthopnea paroxysmal nocturnal dyspnea chest pain palpitations headache focal weakness loss of sensation auditory or visual symptoms abdominal stool or urinary complaints or any other associated symptoms. Objective: VITALS:  
Last 24hrs VS reviewed since prior progress note. Most recent are: 
Patient Vitals for the past 24 hrs: 
 Temp Pulse Resp BP SpO2  
21 0742 100.2 °F (37.9 °C) 86 18 (!) 150/63 96 % 21 0400  (!) 119     
21 0105 (!) 96 °F (35.6 °C) 79 18 (!) 149/65 96 % 21 0000  82     
21 2009 (!) 102.5 °F (39.2 °C) 95 20 (!) 157/65   
21 2000  95     
21 1645 (!) 100.7 °F (38.2 °C) 81 16 133/61   
21 1620  80 18 (!) 106/57   
21 1612    104/61   
21 1545 (!) 101.2 °F (38.4 °C) 83  (!) 145/59   
21 1515  83  (!) 158/65   
21 1445 (!) 102.8 °F (39.3 °C) 86  (!) 159/81   
21 1415  87  (!) 159/71   
21 1345 (!) 103.1 °F (39.5 °C) 92 18 (!) 143/88  Intake/Output Summary (Last 24 hours) at 2021 1238 Last data filed at 2021 0900 Gross per 24 hour Intake 240 ml Output 1700 ml Net -1460 ml PHYSICAL EXAM: 
General: Patient appears comfortable EENT:  EOMI. Anicteric sclerae. MMM Resp: CTA bilaterally, no wheezing or rales. No accessory muscle use CV:  Regular  rhythm,  No edema GI:  Soft, Non distended, Non tender. +Bowel sounds Neurologic:  Alert and oriented X 3, normal speech, Psych:   Good insight. Not anxious nor agitated Skin:  No rashes. No jaundice Procedures: see electronic medical records for all procedures/Xrays and details which were not copied into this note but were reviewed prior to creation of Plan. LABS: 
I reviewed today's most current labs and imaging studies. Pertinent labs include: 
Recent Labs  
  02/25/21 
0420 02/24/21 
1800 WBC 14.0* 14.5* HGB 9.0* 9.9*  
HCT 28.9* 31.2*  
* 118* Recent Labs  
  02/25/21 
0420 02/24/21 
1600  135* K 3.7 4.3  100 CO2 24 25 * 252* BUN 38* 33* CREA 4.78* 4.31* CA 8.9 9.6 ALB 3.1* 3.7 TBILI 0.4 0.3 ALT 15 19 Signed: Francisco García MD 
 
Reviewed most current lab test results and cultures  YES Reviewed most current radiology test results   YES Review and summation of old records today    NO Reviewed patient's current orders and MAR    YES 
PMH/SH reviewed - no change compared to H&P 
______________________________________ Assessment/Plan: 
 
Sepsis 2/2 gram positive bacteremiapatient presents with above-mentioned symptomatology found to meet sepsis criteria given the fact the patient was febrile with evidence of leukocytosis however with gram positive bacteremia, of note patient cannot receive fluid resuscitation as per sepsis protocol secondary to being end-stage renal disease on hemodialysis We will administer normal saline bolus 500 cc x 1 Follow-up blood cultures Follow up TTE Continue Vancomycin  For antibiotic coverage Infectious disease consult appreciated, will continue to follow 
  
End-stage renal disease on hemodialysispatient has a history of end-stage renal disease on hemodialysis with concerns of clotting of fistula Nephrology consult appreciated, will continue to follow 
  
Hyperglycemia due to type 2 diabetescontinue insulin sliding scale at this time, recalculate insulin requirement according to 24-hour coverage 
  
Hypertensioncontinue home antihypertensive medications 
  
Hyperlipidemiacontinue statin 
  
ProphylaxisHeparin subcu FENrenal diet, potassium and magnesium to be repleted with dialysis Full code, Disposition- Pending clinical improvement 
__________________________________ Care Plan discussed with: 
  Comments Patient X Family  x   
RN X   
Care Manager X Consultant  X   
                 X Multidiciplinary team rounds were held today with , nursing, pharmacist and clinical coordinator. Patient's plan of care was discussed; medications were reviewed and discharge planning was addressed. ________________________________________________________________________ Total NON critical care TIME:  35   Minutes Comments >50% of visit spent in counseling and coordination of care X   
________________________________________________________________________ Georgie Abbasi MD

## 2021-02-26 NOTE — PROGRESS NOTES
Reason for Admission:  Sepsis RUR Score: 15% Plan for utilizing home health:  Open to it if recommended PCP: First and Last name:  Justo Goldman Name of Practice:  
 Are you a current patient: Yes/No: Yes Approximate date of last visit: 3 months ago Can you participate in a virtual visit with your PCP:  
                 
Current Advanced Directive/Advance Care Plan: None Healthcare Decision Maker:  Luisana Garber ([phone]) Click here to complete [address] including selection of the Healthcare Decision Maker Relationship (ie \"Primary\") Transition of Care Plan:  Home self care vs Home with [facility] 
 
CM met with patient in reference to discharge planning and assessment. She stated that she lives with her son and that she has 2 daughters that live near by. She denies having a POA or AMD and stated that she is a full code. She denies having or needing DME and reports that she is open to [facility] if it is recommended. She gave no preference to [facility] agency and consented to referrals being sent to agencies in her area. She stated that her  will pick her up at discharge. CM will follow for discharge planning needs. Advance Care Planning Healthcare Decision Maker: Luisana Garber ([phone]) Primary Decision Maker: Vishalshawn Crystal - Spouse - [phone] Click here to complete [address] including selection of the Healthcare Decision Maker Relationship (ie \"Primary\")

## 2021-02-26 NOTE — ROUTINE PROCESS
Bedside and Verbal shift change report given to Sona Gibbs RN (oncoming nurse) by Neal Tripp (offgoing nurse).  Report included the following information SBAR, Kardex, Intake/Output, MAR, Accordion, Recent Results and Cardiac Rhythm SR.

## 2021-02-26 NOTE — PROGRESS NOTES
Renal progress Note NAME:  Gonzalo Fuller :   1955 MRN:   [id number] Date/Time:  2021 1:08 PM 
 
 
Subjective:  
Patient seen in the room.  at bedside. She is still febrile. She was dialyzed yesterday with 1.7 L fluid removal.  On vancomycin IV REVIEW OF SYSTEMS:    
 
Constitutional: Negative for chills and fever. HENT: Negative. Eyes: Negative. Respiratory: Negative. Cardiovascular: Negative. Gastrointestinal: Negative for abdominal pain and nausea. Skin: Negative. Neurological: Negative. Objective: VITALS:   
Visit Vitals BP (!) 150/63 (BP 1 Location: Right lower arm, BP Patient Position: At rest) Pulse 86 Temp 100.2 °F (37.9 °C) Resp 18 Ht 5' 3\" (1.6 m) Wt 89.6 kg (197 lb 8.5 oz) SpO2 96% BMI 34.99 kg/m² Temp (24hrs), Av.9 °F (38.3 °C), Min:96 °F (35.6 °C), Max:103.1 °F (39.5 °C) PHYSICAL EXAM:  
General:    Alert, cooperative, no distress, appears stated age. HEENT: Atraumatic, anicteric sclerae, pink conjunctivae No oral ulcers, mucosa moist, throat clear Neck:  Supple, symmetrical,  thyroid: non tender Lungs:   Clear to auscultation bilaterally. No Wheezing or Rhonchi. No rales. Chest wall:  No tenderness  No Accessory muscle use. Heart:   Regular  rhythm,  No  murmur   No edema Abdomen:   Soft, non-tender. Not distended. Bowel sounds normal 
Extremities: No cyanosis. No clubbing. Left arm AV graft with no visible erythema over the skin. However a scab seen from the bleeding site Skin:     Not pale. Not Jaundiced  No rashes Psych:  Good insight. Not depressed. Not anxious or agitated. Neurologic: EOMs intact. No facial asymmetry. No aphasia or slurred speech. Symmetrical strength, Alert and oriented X 4. LAB DATA REVIEWED:   
Recent Results (from the past 24 hour(s)) CULTURE, BLOOD, PAIRED Collection Time: 21  1:50 PM  
 Specimen: Blood Result Value Ref Range  Special Requests: No Special Requests Culture result:     
  Gram pos cocci in clusters [facility] to [address] [place] [address] laboratory to assess for possible growth. CALLED TO AND READ BACK BY 
Ruddy Bahena [id number] BY NATHEN 
  
GLUCOSE, POC Collection Time: 02/25/21  9:31 PM  
Result Value Ref Range Glucose (POC) 190 (H) 65 - 100 mg/dL Performed by Josefina Sharma, POC Collection Time: 02/26/21  7:45 AM  
Result Value Ref Range Glucose (POC) 246 (H) 65 - 100 mg/dL Performed by [facility] BEAN   
CBC WITH AUTOMATED DIFF Collection Time: 02/26/21  8:42 AM  
Result Value Ref Range WBC 12.6 (H) 3.6 - 11.0 K/uL  
 RBC 2.79 (L) 3.80 - 5.20 M/uL HGB 8.5 (L) 11.5 - 16.0 g/dL HCT 27.0 (L) 35.0 - 47.0 % MCV 96.8 80.0 - 99.0 FL  
 MCH 30.5 26.0 - 34.0 PG  
 MCHC 31.5 30.0 - 36.5 g/dL  
 RDW 14.8 (H) 11.5 - 14.5 % PLATELET 279 (L) 059 - 400 K/uL MPV 12.5 8.9 - 12.9 FL  
 NRBC 0.0 0  WBC ABSOLUTE NRBC 0.00 0.00 - 0.01 K/uL NEUTROPHILS 81 (H) 32 - 75 % LYMPHOCYTES 8 (L) 12 - 49 % MONOCYTES 11 5 - 13 % EOSINOPHILS 0 0 - 7 % BASOPHILS 0 0 - 1 % IMMATURE GRANULOCYTES 0 0.0 - 0.5 % ABS. NEUTROPHILS 10.2 (H) 1.8 - 8.0 K/UL  
 ABS. LYMPHOCYTES 1.0 0.8 - 3.5 K/UL  
 ABS. MONOCYTES 1.3 (H) 0.0 - 1.0 K/UL  
 ABS. EOSINOPHILS 0.0 0.0 - 0.4 K/UL  
 ABS. BASOPHILS 0.0 0.0 - 0.1 K/UL  
 ABS. IMM. GRANS. 0.0 0.00 - 0.04 K/UL  
 DF AUTOMATED    
C REACTIVE PROTEIN, QT Collection Time: 02/26/21  8:42 AM  
Result Value Ref Range C-Reactive protein 16.60 (H) 0.00 - 0.60 mg/dL PROCALCITONIN Collection Time: 02/26/21  8:42 AM  
Result Value Ref Range Procalcitonin 180.97 (H) 0 ng/mL METABOLIC PANEL, BASIC Collection Time: 02/26/21  8:42 AM  
Result Value Ref Range Sodium 133 (L) 136 - 145 mmol/L Potassium 3.7 3.5 - 5.1 mmol/L Chloride 96 (L) 97 - 108 mmol/L  
 CO2 23 21 - 32 mmol/L  Anion gap 14 5 - 15 mmol/L Glucose 232 (H) 65 - 100 mg/dL BUN 28 (H) 6 - 20 mg/dL Creatinine 3.78 (H) 0.55 - 1.02 mg/dL BUN/Creatinine ratio 7 (L) 12 - 20 GFR est AA 14 (L) >60 ml/min/1.73m2 GFR est non-AA 12 (L) >60 ml/min/1.73m2 Calcium 9.0 8.5 - 10.1 mg/dL MAGNESIUM Collection Time: 02/26/21  8:42 AM  
Result Value Ref Range Magnesium 2.2 1.6 - 2.4 mg/dL GLUCOSE, POC Collection Time: 02/26/21 11:41 AM  
Result Value Ref Range Glucose (POC) 165 (H) 65 - 100 mg/dL Performed by [facility] Recommendations/Plan:  
#1 ESRD 
-She goes for dialysis at [facility] every TTS 
-She was dialyzed yesterday per schedule with 1.7 L fluid removal 
-Left arm AV graft is current access 
-No uremic or volume overload symptoms 
-We will plan HD in a.m. We will try to remove about 2 L of fluid 
-Continue TTS schedule in the hospital 
 
#2 renal osteodystrophy 
-Not noted to be on any phosphorus binders. We will check phosphorus with next labs. Obtain PTH from outpatient and continue Zemplar if indicated 
-Calcium is okay #3 anemia 
-hemoglobin is 9.9. Anemia of chronic kidney disease 
-Continue Procrit with dialysis #4 sepsis 
-Presented with fever over 103, leukocytosis and elevated lactic acid 
-Positive blood cultures with gram-positive cocci in clusters consistent with staph 
-Possible source is infected left arm AV graft 
-ID has been consulted. Continue IV vancomycin 
-TTE has been ordered to rule out vegetations 
-Will probably need long-term antibiotics at least for 6 weeks can be given on dialysis #5 hypertension 
-Blood pressure is well controlled with amlodipine and hydralazine which I will continue 
 
 
 
________________________________________________________________________ Signed: Terra Godinez MD

## 2021-02-26 NOTE — PROGRESS NOTES
Progress Note Patient: Yadira Witt MRN: [id number]  SSN: [id number] YOB: 1955  Age: 77 y.o. Sex: female Admit Date: 2/24/2021 LOS: 2 days Subjective:  
Patient followed for sepsis with positive blood cultures now identified as MRSA with suspected infected graft left arm. Patient remains febrile with leukocytosis and markedly elevated procal and CRP. She is currently on IV Vancomycin alone. She is very uncomfortable at this time because of chills. Objective:  
 
Vitals:  
 02/26/21 0311 02/26/21 0400 02/26/21 0742 02/26/21 1606 BP:   (!) 150/63 (!) 171/71 Pulse:  (!) 119 86 86 Resp:   18 18 Temp:   100.2 °F (37.9 °C) (!) 102.3 °F (39.1 °C) TempSrc:      
SpO2:   96% 99% Weight: 197 lb 8.5 oz (89.6 kg) Height:      
  
 
Intake and Output: 
Current Shift: 02/26 0701 - 02/26 1900 In: 240 [P.O.:240] Out: - Last three shifts: 02/24 1901 - 02/26 0700 In: 56 [P.O.:240; I.V.:650] Out: 1700 Physical Exam:  
 Constitutional:   
   General: She is not in acute distress. Appearance: She is obese. She is ill-appearing. HENT:  
   Head: Normocephalic and atraumatic. Nose: Nose normal.  
   Mouth/Throat:  
   Pharynx: Oropharynx is clear. Eyes:  
   Pupils: Pupils are equal, round, and reactive to light. Cardiovascular:  
   Rate and Rhythm: Normal rate and regular rhythm. Heart sounds: No murmur. Pulmonary:  
   Breath sounds: No wheezing, rhonchi or rales. Genitourinary: 
   Comments: No Ann Musculoskeletal:  
   Right lower leg: No edema. Left lower leg: No edema. Comments: Left upper arm graft without tenderness or erythema, no drainage Skin: 
   Findings: No rash. Neurological:  
   General: No focal deficit present. Mental Status: She is alert and oriented to person, place, and time.   
Psychiatric:     
   Mood and Affect: Mood normal.     
   Behavior: Behavior normal.     
 Thought Content: Thought content normal.     
   Judgment: Judgment normal. 
 
Lab/Data Review: WBC 12,600 Procalcitonin 180.97  
CRP 16.60 Blood cultures (2/24) MRSA Blood cultures (2/25) Gram positive cocci in clusters Blood cultures (2/26)  Pending Assessment:  
 
Active Problems: 
  ESRD on dialysis ([facility] [address] (2/24/2021) Sepsis ([facility] [address] (2/24/2021) 
 
  
1. Sepsis with fever, leukocytosis and elevated lactic acid, procalcitonin and CRP 2. MRSA bacteremia, Day #3 IV Vancomycin 3. Probable infected AV graft left arm, secondary to above 4. ESRD on HD 5. Covid-19 negative Plan: 1. Continue IV Vancomycin 2. Add Ceftaroline 200 mg IV q12 hours for synergy 3. Follow-up blood cultures 4. Awaiting TTE to rule out vegetations 5. In am, repeat CBC, procal and CRP, repeat blood cultures Signed By: Aki De Los Santos MD   
 February 26, 2021

## 2021-02-26 NOTE — ROUTINE PROCESS
Bedside and Verbal shift change report given to Blake Zimmer RN (oncoming nurse) by Corina Telles (offgoing nurse).  Report included the following information SBAR, Kardex, Intake/Output, MAR, Accordion, Recent Results and Cardiac Rhythm SR.

## 2021-02-27 NOTE — PROGRESS NOTES
Renal progress Note NAME:  Gissel Castelan :   1955 MRN:   [id number] Date/Time:  2021 1:08 PM 
 
 
Subjective:  
Patient seen in the room. She is currently afebrile. For dialysis today Noted constipation REVIEW OF SYSTEMS:    
 
Constitutional: Negative for chills and fever. HENT: Negative. Eyes: Negative. Respiratory: Negative. Cardiovascular: Negative. Gastrointestinal: Negative for abdominal pain and nausea. Skin: Negative. Neurological: Negative. Objective: VITALS:   
Visit Vitals BP (!) 151/73 Pulse 69 Temp 98.4 °F (36.9 °C) (Oral) Resp 16 Ht 5' 3\" (1.6 m) Wt 89.4 kg (197 lb 1.5 oz) SpO2 100% BMI 34.91 kg/m² Temp (24hrs), Av.2 °F (37.9 °C), Min:98.4 °F (36.9 °C), Max:102.3 °F (39.1 °C) PHYSICAL EXAM:  
General:    Alert, cooperative, no distress, appears stated age. HEENT: Atraumatic, anicteric sclerae, pink conjunctivae No oral ulcers, mucosa moist, throat clear Neck:  Supple, symmetrical,  thyroid: non tender Lungs:   Clear to auscultation bilaterally. No Wheezing or Rhonchi. No rales. Chest wall:  No tenderness  No Accessory muscle use. Heart:   Regular  rhythm,  No  murmur   No edema Abdomen:   Soft, non-tender. Not distended. Bowel sounds normal 
Extremities: No cyanosis. No clubbing. Left arm AV graft with no visible erythema over the skin. However a scab seen from the bleeding site Skin:     Not pale. Not Jaundiced  No rashes Psych:  Good insight. Not depressed. Not anxious or agitated. Neurologic: EOMs intact. No facial asymmetry. No aphasia or slurred speech. Symmetrical strength, Alert and oriented X 4. LAB DATA REVIEWED:   
Recent Results (from the past 24 hour(s)) GLUCOSE, POC Collection Time: 21  4:12 PM  
Result Value Ref Range Glucose (POC) 194 (H) 65 - 100 mg/dL Performed by Aster Boogie, POC  Collection Time: 21  9:13 PM  
 Result Value Ref Range  
 Glucose (POC) 221 (H) 65 - 100 mg/dL  
 Performed by COREY GRAMAJO   
VANCOMYCIN, RANDOM  
 Collection Time: 02/27/21  5:01 AM  
Result Value Ref Range  
 Vancomycin, random 11.7 ug/mL  
 Reported dose date Not provided    
 Reported dose: Not provided Units  
METABOLIC PANEL, BASIC  
 Collection Time: 02/27/21  5:01 AM  
Result Value Ref Range  
 Sodium 131 (L) 136 - 145 mmol/L  
 Potassium 3.7 3.5 - 5.1 mmol/L  
 Chloride 95 (L) 97 - 108 mmol/L  
 CO2 24 21 - 32 mmol/L  
 Anion gap 12 5 - 15 mmol/L  
 Glucose 316 (H) 65 - 100 mg/dL  
 BUN 44 (H) 6 - 20 mg/dL  
 Creatinine 4.69 (H) 0.55 - 1.02 mg/dL  
 BUN/Creatinine ratio 9 (L) 12 - 20    
 GFR est AA 11 (L) >60 ml/min/1.73m2  
 GFR est non-AA 9 (L) >60 ml/min/1.73m2  
 Calcium 8.8 8.5 - 10.1 mg/dL  
MAGNESIUM  
 Collection Time: 02/27/21  5:01 AM  
Result Value Ref Range  
 Magnesium 2.4 1.6 - 2.4 mg/dL  
CBC WITH AUTOMATED DIFF  
 Collection Time: 02/27/21  5:01 AM  
Result Value Ref Range  
 WBC 11.7 (H) 3.6 - 11.0 K/uL  
 RBC 2.85 (L) 3.80 - 5.20 M/uL  
 HGB 8.5 (L) 11.5 - 16.0 g/dL  
 HCT 27.0 (L) 35.0 - 47.0 %  
 MCV 94.7 80.0 - 99.0 FL  
 MCH 29.8 26.0 - 34.0 PG  
 MCHC 31.5 30.0 - 36.5 g/dL  
 RDW 14.7 (H) 11.5 - 14.5 %  
 PLATELET 129 (L) 150 - 400 K/uL  
 MPV 12.7 8.9 - 12.9 FL  
 NRBC 0.0 0  WBC  
 ABSOLUTE NRBC 0.00 0.00 - 0.01 K/uL  
 NEUTROPHILS 83 (H) 32 - 75 %  
 LYMPHOCYTES 5 (L) 12 - 49 %  
 MONOCYTES 12 5 - 13 %  
 EOSINOPHILS 0 0 - 7 %  
 BASOPHILS 0 0 - 1 %  
 IMMATURE GRANULOCYTES 0 0.0 - 0.5 %  
 ABS. NEUTROPHILS 9.8 (H) 1.8 - 8.0 K/UL  
 ABS. LYMPHOCYTES 0.6 (L) 0.8 - 3.5 K/UL  
 ABS. MONOCYTES 1.3 (H) 0.0 - 1.0 K/UL  
 ABS. EOSINOPHILS 0.0 0.0 - 0.4 K/UL  
 ABS. BASOPHILS 0.0 0.0 - 0.1 K/UL  
 ABS. IMM. GRANS. 0.0 0.00 - 0.04 K/UL  
 DF AUTOMATED    
PROCALCITONIN  
 Collection Time: 02/27/21  5:01 AM  
Result Value Ref Range  
 Procalcitonin 128.41 (H) 0 ng/mL  
C REACTIVE PROTEIN, QT  
 Collection Time: 02/27/21  5:01 AM  
Result Value Ref Range C-Reactive protein 17.60 (H) 0.00 - 0.60 mg/dL GLUCOSE, POC Collection Time: 02/27/21  8:52 AM  
Result Value Ref Range Glucose (POC) 390 (H) 65 - 100 mg/dL Performed by Shital Hernandez, POC Collection Time: 02/27/21 11:19 AM  
Result Value Ref Range Glucose (POC) 310 (H) 65 - 100 mg/dL Performed by Aston Verdin Recommendations/Plan:  
#1 ESRD 
-She goes for dialysis at [facility] every TTS 
-She was dialyzed last on 2/25 per schedule with 1.7 L fluid removal 
-Left arm AV graft is current access 
-No uremic or volume overload symptoms 
-Will be dialyzed today with 3/2.5 bath with goal to remove 2 L of fluid 
-Continue TTS schedule in the hospital 
 
#2 renal osteodystrophy 
-Not noted to be on any phosphorus binders. We will check phosphorus with next labs. Obtain PTH from outpatient and continue Zemplar if indicated 
-Calcium is okay #3 anemia 
-hemoglobin is 9.9. Anemia of chronic kidney disease 
-Continue Procrit with dialysis #4 sepsis 
-Presented with fever over 103, leukocytosis and elevated lactic acid 
-Positive blood cultures with gram-positive cocci in clusters consistent with staph 
-Possible source is infected left arm AV graft 
-On IV vancomycin and ceftaroline synergy 
-Waiting TTE to rule out vegetations 
-Will probably need long-term antibiotics at least for 6 weeks can be given on dialysis #5 hypertension 
-Blood pressure is well controlled with amlodipine and hydralazine which I will continue 
 
 
 
________________________________________________________________________ Signed: Fabienne Maxwell MD

## 2021-02-27 NOTE — PROGRESS NOTES
Hospitalist Progress Note NAME: Arminda Louie :  1955 MRN:  [id number] Subjective: Chief Complaint / Reason for Physician Visit Patient seen and evaluated at bedside, of note feels slightly better at this time, patient does have abdominal discomfort as patient has not had a bowel movement in 4 days. Discussed with RN events overnight. Review of Systems: 
Symptom Y/N Comments  Symptom Y/N Comments Fever/Chills Y   Chest Pain N Poor Appetite N   Edema N   
Cough N   Abdominal Pain N Sputum N   Joint Pain N   
SOB/RIGGS N   Pruritis/Rash N   
Nausea/vomit N   Tolerating PT/OT NA Diarrhea N   Tolerating Diet Y Constipation N   Other Could NOT obtain due to:   
Patient denies any nausea vomiting lightheadedness dizziness dyspnea orthopnea paroxysmal nocturnal dyspnea chest pain palpitations headache focal weakness loss of sensation auditory or visual symptoms abdominal stool or urinary complaints or any other associated symptoms. Objective: VITALS:  
Last 24hrs VS reviewed since prior progress note. Most recent are: 
Patient Vitals for the past 24 hrs: 
 Temp Pulse Resp BP SpO2  
21 0521 (!) 100.7 °F (38.2 °C)      
21 0353  86     
21 0023 100.3 °F (37.9 °C) 86 18 (!) 162/78 100 % 21 0000  76     
21 2118 (!) 100.5 °F (38.1 °C) 80 20 (!) 158/57 94 % 21 2106 (!) 100.5 °F (38.1 °C) 80 20 (!) 158/57 94 % 21 2000  77     
21 1606 (!) 102.3 °F (39.1 °C) 86 18 (!) 171/71 99 % Intake/Output Summary (Last 24 hours) at 2021 2689 Last data filed at 2021 0900 Gross per 24 hour Intake 240 ml Output  Net 240 ml PHYSICAL EXAM: 
General: Patient appears comfortable EENT:  EOMI. Anicteric sclerae. MMM Resp:  CTA bilaterally, no wheezing or rales. No accessory muscle use CV:  Regular  rhythm,  No edema GI:  Soft, Non distended, Non tender. +Bowel sounds Neurologic:  Alert and oriented X 3, normal speech, Psych:   Good insight. Not anxious nor agitated Skin:  No rashes. No jaundice Procedures: see electronic medical records for all procedures/Xrays and details which were not copied into this note but were reviewed prior to creation of Plan. LABS: 
I reviewed today's most current labs and imaging studies. Pertinent labs include: 
Recent Labs  
  02/25/21 
0420 02/24/21 
1800 WBC 14.0* 14.5* HGB 9.0* 9.9*  
HCT 28.9* 31.2*  
* 118* Recent Labs  
  02/25/21 
0420 02/24/21 
1600  135* K 3.7 4.3  100 CO2 24 25 * 252* BUN 38* 33* CREA 4.78* 4.31* CA 8.9 9.6 ALB 3.1* 3.7 TBILI 0.4 0.3 ALT 15 19 Signed: Annie Clements MD 
 
Reviewed most current lab test results and cultures  YES Reviewed most current radiology test results   YES Review and summation of old records today    NO Reviewed patient's current orders and MAR    YES 
PMH/SH reviewed - no change compared to H&P 
______________________________________ Assessment/Plan: 
 
Sepsis 2/2 gram positive bacteremiapatient presents with above-mentioned symptomatology found to meet sepsis criteria given the fact the patient was febrile with evidence of leukocytosis however with gram positive bacteremia, of note patient cannot receive fluid resuscitation as per sepsis protocol secondary to being end-stage renal disease on hemodialysis could be secondary to infected graft Follow-up blood cultures Follow up TTE Continue Vancomycin and ceftaroline for antibiotic coverage Infectious disease consult appreciated, will continue to follow 
  
End-stage renal disease on hemodialysispatient has a history of end-stage renal disease on hemodialysis with concerns of clotting of fistula Nephrology consult appreciated, will continue to follow 
  
Hyperglycemia due to type 2 diabetescontinue insulin sliding scale at this time, recalculate insulin requirement according to 24-hour coverage Constipation -of note patient has not had a bowel movement since Tuesday, patient requesting enema Administer enema stat Continue to monitor for bowel movements 
  
Hypertensioncontinue home antihypertensive medications 
  
Hyperlipidemiacontinue statin 
  
ProphylaxisHeparin subcu FENrenal diet, potassium and magnesium to be repleted with dialysis Full code, Disposition- Pending clinical improvement 
__________________________________ Care Plan discussed with: 
  Comments Patient X Family  x   
RN X   
Care Manager X Consultant  X   
                 X Multidiciplinary team rounds were held today with , nursing, pharmacist and clinical coordinator. Patient's plan of care was discussed; medications were reviewed and discharge planning was addressed. ________________________________________________________________________ Total NON critical care TIME:  35   Minutes Comments >50% of visit spent in counseling and coordination of care X   
________________________________________________________________________ Pierre Pizano MD

## 2021-02-27 NOTE — PROGRESS NOTES
Bedside shift change report given to Edwar Workman RN (oncoming nurse) by Mickey Cristobal LPN (offgoing nurse). Report included the following information SBAR, Kardex, MAR and Recent Results.

## 2021-02-27 NOTE — PROGRESS NOTES
Dr. Joyce Francisco notified of procalcitonin level of 128.41, which is a decrease from yesterday's level of 180.97. No orders received.

## 2021-02-27 NOTE — PROGRESS NOTES
Consult for Vancomycin Dosing by Pharmacy by Ping Nelson Consult provided for this 77y.o. year old female , for indication of sepsis/PNA Day of Therapy: 4 Goal of Level(s): 15-20mcg/dL Results Procedure Component Value Units Date/Time CULTURE, BLOOD [[id number]] Collected: 02/27/21 0501 Order Status: Completed Specimen: Blood Updated: 02/27/21 0531 CULTURE, BLOOD [[id number]] Collected: 02/26/21 0849 Order Status: Completed Specimen: Blood Updated: 02/26/21 1119 CULTURE, BLOOD [[id number]] Collected: 02/25/21 1400 Order Status: Canceled Specimen: Blood CULTURE, BLOOD, PAIRED [[id number]]  (Abnormal) Collected: 02/25/21 1350 Order Status: Completed Specimen: Blood Updated: 02/27/21 6731 Special Requests: No Special Requests Culture result:    
  Staphylococcus aureus GROWING IN  4 OF 4 BOTTLES DRAWN. .. LEFT UPPER ARM AV GRAFT SITE PRELIMINARY RESULT OF Gram Positive Cocci in clusters GROWING IN THESE 2 BOTTLES CALLED TO AND READ BACK BY MEGAN Acevedo AT 7104, 2/26/21 CULTURE, BLOOD, PAIRED [[id number]] Collected: 02/25/21 1330 Order Status: Canceled Specimen: Blood COVID-19 RAPID TEST [[id number]] Collected: 02/24/21 2018 Order Status: Completed Specimen: Nasopharyngeal Updated: 02/24/21 2058 Specimen source Nasopharyngeal     
  COVID-19 rapid test Not Detected Comment: Rapid Abbott ID Now   Rapid NAAT:  The specimen is NEGATIVE for SARS-CoV-2, the novel coronavirus associated with COVID-19. Negative results should be treated as presumptive and, if inconsistent with clinical signs and symptoms or necessary for patient management, should be tested with an alternative molecular assay. Negative results do not preclude SARS-CoV-2 infection and should not be used as the sole basis for patient management decisions. This test has been authorized by the FDA under  
an Emergency Use Authorization (EUA) for use by authorized laboratories.  Fact sheet for Healthcare Providers: ConventionUpdate.co.nz Fact sheet for Patients: ConventionUpdate.co.nz   Methodology: Isothermal Nucleic Acid Amplification CULTURE, BLOOD [[id number]] Collected: 02/24/21 1600 Order Status: Completed Specimen: Blood Updated: 02/26/21 1246 Special Requests: No Special Requests Culture result:    
  Two of two bottles have been flagged positive by instrument. Bottles have been sent to [facility] laboratory to assess for possible growth. Gram Positive Cocci in clusters CALLED TO AND READ BACK BY MS Mary Houser 02/25/21 Preliminary report of Methicillin Resistant Staphylococcus aureus by antigen detection. Confirmation and Sensitivities to follow. GROWING IN AEROBIC BOTTLE 
NO SITE INDICATED 
  
 CULTURE, BLOOD [[id number]]  (Abnormal) Collected: 02/24/21 1600 Order Status: Completed Specimen: Blood Updated: 02/26/21 1152 Special Requests: No Special Requests Culture result:    
  Gram Positive Cocci in clusters GROWING IN AEROBIC BOTTLE NO SITE INDICATED Serum Creatinine Creatinine Date Value Ref Range Status 02/27/2021 4.69 (H) 0.55 - 1.02 mg/dL Final  
  Comment: DELTA  
02/26/2021 3.78 (H) 0.55 - 1.02 mg/dL Final  
  Comment:  
  Investigated per delta check protocol 02/25/2021 4.78 (H) 0.55 - 1.02 mg/dL Final  
  
Creatinine Clearance Estimated Creatinine Clearance: 12.3 mL/min (A) (based on SCr of 4.69 mg/dL (H)). BUN Lab Results Component Value Date/Time BUN 44 (H) 02/27/2021 05:01 AM  
 BUN (POC) 34 (H) 09/02/2020 07:06 AM  
  
WBC Lab Results Component Value Date/Time WBC 11.7 (H) 02/27/2021 05:01 AM  
  
Temp 98.6 °F (37 °C) Last Level: No results found for: [facility] Vancomycin, random Date Value Ref Range Status 02/27/2021 11.7 ug/mL Final  
  Comment: No reference range has been established.   
 
 
Ht Readings from Last 1 Encounters: 02/24/21 160 cm (63\") Wt Readings from Last 1 Encounters:  
02/27/21 89.4 kg (197 lb 1.5 oz) Ideal body weight: 52.4 kg (115 lb 8.3 oz) Adjusted ideal body weight: 67.2 kg (148 lb 2.4 oz) New Regimen:   Pulse  Dosing per levels Vancomycin level was 11.7 this am and patient due for dialysis this afternoon (2nd shift) . Vancomycin 1250g iv is ordered by pharmacy to give at ~ 1700 today. Pre dialysis random only level scheduled for 3/2/2021 ~ 0600 Pharmacy to follow daily and will make changes to dose and/or frequency based on clinical status. _________________________________ Pharmacist MICAELA Zamarripa [facility]

## 2021-02-27 NOTE — PROGRESS NOTES
Progress Note Patient: Yady Hinson MRN: [id number]  SSN: [id number] YOB: 1955  Age: 77 y.o. Sex: female Admit Date: 2/24/2021 LOS: 3 days Subjective:  
Patient followed for sepsis with positive blood cultures now identified as MRSA with suspected infected graft left arm. Repeat blood cultures also growing MRSA or GPC in clusters. Patient's temperature is trending downward along with WBC and procal.  She is currently on IV Vancomycin and Ceftaroline. She is not having severe chills today. Objective:  
 
Vitals:  
 02/27/21 1430 02/27/21 1500 02/27/21 1600 02/27/21 1638 BP: (!) 157/61 (!) 146/56  (!) 123/54 Pulse: 73 73 73 73 Resp: 16 Temp:      
TempSrc:      
SpO2:      
Weight:      
Height:      
  
 
Intake and Output: 
Current Shift: No intake/output data recorded. Last three shifts: 02/25 1901 - 02/27 0700 In: 240 [P.O.:240] Out: - Physical Exam:  
 Constitutional:   
   General: She is not in acute distress. Appearance: She is obese. She is ill-appearing. HENT:  
   Head: Normocephalic and atraumatic. Nose: Nose normal.  
   Mouth/Throat:  
   Pharynx: Oropharynx is clear. Eyes:  
   Pupils: Pupils are equal, round, and reactive to light. Cardiovascular:  
   Rate and Rhythm: Normal rate and regular rhythm. Heart sounds: No murmur. Pulmonary:  
   Breath sounds: No wheezing, rhonchi or rales. Genitourinary: 
   Comments: No Ann Musculoskeletal:  
   Right lower leg: No edema. Left lower leg: No edema. Comments: Left upper arm graft without tenderness or erythema, no drainage Skin: 
   Findings: No rash. Neurological:  
   General: No focal deficit present. Mental Status: She is alert and oriented to person, place, and time. Psychiatric:     
   Mood and Affect: Mood normal.     
   Behavior: Behavior normal.     
   Thought Content:  Thought content normal.     
   Judgment: Judgment normal. 
 
Lab/Data Review: WBC 11,700 Procalcitonin 128.41 <180.97  
CRP 17.60 <16.60 Blood cultures (2/24) MRSA Blood cultures (2/25) MRSA Blood cultures (2/26) Gram positive cocci in clusters Blood cultures (2/27) Pending Assessment:  
 
Active Problems: 
  ESRD on dialysis ([facility] [address] (2/24/2021) Sepsis ([facility] [address] (2/24/2021) 
 
  
1. Sepsis with fever, leukocytosis and elevated lactic acid, procalcitonin and CRP 2. MRSA bacteremia, Day #4 IV Vancomycin, Day  #2 Ceftaroline 3. Probable infected AV graft left arm, secondary to above 4. ESRD on HD 5. Covid-19 negative Plan: 1. Continue IV Vancomycin and Ceftaroline 200 mg IV q12 hours for synergy 2. Follow-up blood cultures 3. Awaiting TTE to rule out vegetations 4. In am, repeat CBC, procal and CRP, repeat blood cultures Signed By: Rich Encinas MD   
 February 27, 2021

## 2021-02-27 NOTE — PROGRESS NOTES
This HD patient with order for Fleets enema x 1 by Dr. Meagan Katz. Spoke with MD for shift to soap suds enema replacing Fleets order. Spoke to [employer] with changes, and entered soap suds enema order.

## 2021-02-28 NOTE — PROGRESS NOTES
Problem: Falls - Risk of 
Goal: *Absence of Falls Description: Document Britney Blood Fall Risk and appropriate interventions in the flowsheet. Outcome: Progressing Towards Goal 
Note: Fall Risk Interventions: 
Mobility Interventions: Bed/chair exit alarm, Patient to call before getting OOB, PT Consult for mobility concerns, PT Consult for assist device competence Mentation Interventions: Bed/chair exit alarm, Eyeglasses and hearing aids, Family/sitter at bedside, More frequent rounding Medication Interventions: Bed/chair exit alarm, Evaluate medications/consider consulting pharmacy, Patient to call before getting OOB Elimination Interventions: Bed/chair exit alarm, Call light in reach, Patient to call for help with toileting needs Problem: Patient Education: Go to Patient Education Activity Goal: Patient/Family Education Outcome: Progressing Towards Goal 
  
Problem: Risk for Spread of Infection Goal: Prevent transmission of infectious organism to others Description: Prevent the transmission of infectious organisms to other patients, staff members, and visitors. Outcome: Progressing Towards Goal 
  
Problem: Patient Education:  Go to Education Activity Goal: Patient/Family Education Outcome: Progressing Towards Goal 
  
Problem: Diabetes Self-Management Goal: *Disease process and treatment process Description: Define diabetes and identify own type of diabetes; list 3 options for treating diabetes. Outcome: Progressing Towards Goal 
Goal: *Incorporating nutritional management into lifestyle Description: Describe effect of type, amount and timing of food on blood glucose; list 3 methods for planning meals. Outcome: Progressing Towards Goal 
Goal: *Incorporating physical activity into lifestyle Description: State effect of exercise on blood glucose levels. Outcome: Progressing Towards Goal 
Goal: *Developing strategies to promote health/change behavior Description: Define the ABC's of diabetes; identify appropriate screenings, schedule and personal plan for screenings. Outcome: Progressing Towards Goal 
Goal: *Using medications safely Description: State effect of diabetes medications on diabetes; name diabetes medication taking, action and side effects. Outcome: Progressing Towards Goal 
Goal: *Monitoring blood glucose, interpreting and using results Description: Identify recommended blood glucose targets  and personal targets. Outcome: Progressing Towards Goal 
Goal: *Prevention, detection, treatment of acute complications Description: List symptoms of hyper- and hypoglycemia; describe how to treat low blood sugar and actions for lowering  high blood glucose level. Outcome: Progressing Towards Goal 
Goal: *Prevention, detection and treatment of chronic complications Description: Define the natural course of diabetes and describe the relationship of blood glucose levels to long term complications of diabetes. Outcome: Progressing Towards Goal 
Goal: *Developing strategies to address psychosocial issues Description: Describe feelings about living with diabetes; identify support needed and support network Outcome: Progressing Towards Goal 
Goal: *Insulin pump training Outcome: Progressing Towards Goal 
Goal: *Sick day guidelines Outcome: Progressing Towards Goal 
Goal: *Patient Specific Goal (EDIT GOAL, INSERT TEXT) Outcome: Progressing Towards Goal 
  
Problem: Patient Education: Go to Patient Education Activity Goal: Patient/Family Education Outcome: Progressing Towards Goal 
  
Problem: Pressure Injury - Risk of 
Goal: *Prevention of pressure injury Description: Document Romaine Scale and appropriate interventions in the flowsheet. Outcome: Progressing Towards Goal 
Note: Pressure Injury Interventions: Activity Interventions: Increase time out of bed, Pressure redistribution bed/mattress(bed type), PT/OT evaluation Mobility Interventions: HOB 30 degrees or less, Pressure redistribution bed/mattress (bed type), PT/OT evaluation Nutrition Interventions: Document food/fluid/supplement intake Friction and Shear Interventions: HOB 30 degrees or less, Lift sheet, Lift team/patient mobility team 
 
  
 
 
 
  
Problem: Patient Education: Go to Patient Education Activity Goal: Patient/Family Education Outcome: Progressing Towards Goal

## 2021-02-28 NOTE — PROGRESS NOTES
Renal progress Note NAME:  Harvey Washington :   1955 MRN:   [id number] Date/Time:  2021 1:08 PM 
 
 
Subjective:  
Patient seen in the room. She is now afebrile. Chills have improved Repeat blood cultures from AV graft are positive again  
she was dialyzed yesterday with 2 L fluid removal 
 
 
 
REVIEW OF SYSTEMS:    
 
Constitutional: Negative for chills and fever. HENT: Negative. Eyes: Negative. Respiratory: Negative. Cardiovascular: Negative. Gastrointestinal: Negative for abdominal pain and nausea. Skin: Negative. Neurological: Negative. Objective: VITALS:   
Visit Vitals BP (!) 154/62 (BP 1 Location: Right upper arm, BP Patient Position: At rest) Pulse 69 Temp 98.4 °F (36.9 °C) Resp 18 Ht 5' 3\" (1.6 m) Wt 89.4 kg (197 lb 1.5 oz) SpO2 100% BMI 34.91 kg/m² Temp (24hrs), Av.6 °F (37 °C), Min:98.1 °F (36.7 °C), Max:99.4 °F (37.4 °C) PHYSICAL EXAM:  
General:    Alert, cooperative, no distress, appears stated age. HEENT: Atraumatic, anicteric sclerae, pink conjunctivae No oral ulcers, mucosa moist, throat clear Neck:  Supple, symmetrical,  thyroid: non tender Lungs:   Clear to auscultation bilaterally. No Wheezing or Rhonchi. No rales. Chest wall:  No tenderness  No Accessory muscle use. Heart:   Regular  rhythm,  No  murmur   No edema Abdomen:   Soft, non-tender. Not distended. Bowel sounds normal 
Extremities: No cyanosis. No clubbing. Left arm AV graft with no visible erythema over the skin. However a scab seen from the bleeding site Skin:     Not pale. Not Jaundiced  No rashes Psych:  Good insight. Not depressed. Not anxious or agitated. Neurologic: EOMs intact. No facial asymmetry. No aphasia or slurred speech. Symmetrical strength, Alert and oriented X 4. LAB DATA REVIEWED:   
Recent Results (from the past 24 hour(s)) GLUCOSE, POC Collection Time: 21 11:19 AM  
Result Value Ref Range Glucose (POC) 310 (H) 65 - 100 mg/dL Performed by Duong Bell GLUCOSE, POC Collection Time: 02/27/21  4:35 PM  
Result Value Ref Range Glucose (POC) 139 (H) 65 - 100 mg/dL Performed by Talia Mercer, POC Collection Time: 02/27/21  8:15 PM  
Result Value Ref Range Glucose (POC) 244 (H) 65 - 100 mg/dL Performed by Wagner Haley CBC W/O DIFF Collection Time: 02/28/21  8:04 AM  
Result Value Ref Range WBC 13.7 (H) 3.6 - 11.0 K/uL  
 RBC 3.08 (L) 3.80 - 5.20 M/uL HGB 9.2 (L) 11.5 - 16.0 g/dL HCT 29.0 (L) 35.0 - 47.0 % MCV 94.2 80.0 - 99.0 FL  
 MCH 29.9 26.0 - 34.0 PG  
 MCHC 31.7 30.0 - 36.5 g/dL  
 RDW 14.5 11.5 - 14.5 % PLATELET 942 054 - 676 K/uL MPV 12.8 8.9 - 88.2 FL  
METABOLIC PANEL, BASIC Collection Time: 02/28/21  8:04 AM  
Result Value Ref Range Sodium 129 (L) 136 - 145 mmol/L Potassium 4.0 3.5 - 5.1 mmol/L Chloride 93 (L) 97 - 108 mmol/L  
 CO2 22 21 - 32 mmol/L Anion gap 14 5 - 15 mmol/L Glucose 272 (H) 65 - 100 mg/dL BUN 40 (H) 6 - 20 mg/dL Creatinine 4.37 (H) 0.55 - 1.02 mg/dL BUN/Creatinine ratio 9 (L) 12 - 20 GFR est AA 12 (L) >60 ml/min/1.73m2 GFR est non-AA 10 (L) >60 ml/min/1.73m2 Calcium 9.3 8.5 - 10.1 mg/dL MAGNESIUM Collection Time: 02/28/21  8:04 AM  
Result Value Ref Range Magnesium 2.6 (H) 1.6 - 2.4 mg/dL GLUCOSE, POC Collection Time: 02/28/21  8:12 AM  
Result Value Ref Range Glucose (POC) 300 (H) 65 - 100 mg/dL Performed by Ericka Morris   
ECHO ADULT COMPLETE Collection Time: 02/28/21  9:05 AM  
Result Value Ref Range Pulmonic Regurgitant End Max Velocity 185.00 cm/s AoV PG 14.00 mmHg Aortic Valve Area by Continuity of Peak Velocity 2.14 cm2 IVSd 1.40 (A) 0.6 - 0.9 cm LVIDd 3.13 (A) 3.9 - 5.3 cm LVIDs 2.14 cm  
 LVOT d 2.00 cm Left Ventricular Outflow Tract Mean Gradient 4.00 mmHg LVOT VTI 28.60 cm  
 LVOT VTI 28.60 cm  Pulmonic Regurgitant End Max Velocity 126.00 cm/s LVOT Peak Gradient 6.00 mmHg LVPWd 1.35 (A) 0.6 - 0.9 cm  
 LV E' Septal Velocity 6.50 cm/s LV ED Vol A2C 30.70 cm3 BP EF 68.1 55 - 100 % LV ES Vol A2C 9.80 cm3 E/E' septal 20.31 LV Ejection Fraction MOD 2C 32 % LVOT SV 90.0 cm3 Mitral Valve Deceleration [place] 5,410.00 mm/s2 Mitral Valve Deceleration [place] 5,410.00 mm/s2 Mitral Valve E Wave Deceleration Time 274.00 ms Mitral Valve Pressure Half-time 77.00 ms MV A Jeff 173.00 cm/s  
 MV E Jeff 132.00 cm/s  
 MV Mean Gradient 6.00 mmHg Mitral Valve Annulus Velocity Time Integral 60.30 cm Mitral Valve Max Velocity 205.00 cm/s  
 MV Peak Gradient 17.00 mmHg MVA (PHT) 2.86 cm2  
 MV E/A 0.76 Pulmonic Regurgitant End Max Velocity 113.00 cm/s Pulmonic Valve Systolic Peak Instantaneous Gradient 5.00 mmHg P Vein A Dur 134.00 ms Pulmonary Vein \"A\" Wave Velocity 31.60 cm/s  
 Est. RA Pressure 3.00 mmHg RVIDd 3.85 cm RVSP 27.00 mmHg Tricuspid Valve Max Velocity 246.00 cm/s Triscuspid Valve Regurgitation Peak Gradient 24.00 mmHg Right Atrial Area 4C 14.42 cm2 LV Mass .2 67 - 162 g  
 LV Mass AL Index 75.0 43 - 95 g/m2 TYLER/BSA Pk Jeff 1.1 cm2/m2 Recommendations/Plan:  
#1 ESRD 
-She goes for dialysis at [facility] every TTS 
-She was dialyzed yesterday per schedule with 2 L fluid removal 
-Left arm AV graft is current access 
-No uremic or volume overload symptoms 
-Next dialysis will be on Tuesday per schedule  
-Continue TTS schedule in the hospital 
 
#2 renal osteodystrophy 
-Not noted to be on any phosphorus binders. We will check phosphorus with next labs. Obtain PTH from outpatient and continue Zemplar if indicated 
-Calcium is okay #3 anemia 
-hemoglobin is 9.2. Anemia of chronic kidney disease 
-Continue Procrit with dialysis #4 sepsis 
-Presented with fever over 103, leukocytosis and elevated lactic acid 
-Positive blood cultures with gram-positive cocci in clusters consistent with staph 
-Possible source is infected left arm AV graft 
-ID has been consulted. Continue IV vancomycin and ceftaroline 
-TTE has been ordered to rule out vegetations 
-Now afebrile. chills have improved after adding ceftaroline for synergy 
-Will probably need long-term antibiotics at least for 6 weeks. can be given on dialysis #5 hypertension 
-Blood pressure is well controlled with amlodipine and hydralazine which I will continue #6 hyponatremia 
-Sodium today has decreased to 129. Will plan dialysis with high sodium bath. -Restrict fluid intake 
 
 
 
________________________________________________________________________ Signed: Tayler Deleon MD

## 2021-02-28 NOTE — PROGRESS NOTES
Bedside and Verbal shift change report given to Naima Murillo RN (oncoming nurse) by Gurpreet Diaz RN (offgoing nurse). Report included the following information SBAR and MAR.

## 2021-02-28 NOTE — PROGRESS NOTES
Hospitalist Progress Note NAME: David Starkey :  1955 MRN:  [id number] Subjective: Chief Complaint / Reason for Physician Visit Patient seen and evaluated at bedside, of note feels slightly better at this time, patient does have abdominal discomfort as patient has not had a bowel movement in 4 days. Discussed with RN events overnight. Review of Systems: 
Symptom Y/N Comments  Symptom Y/N Comments Fever/Chills Y   Chest Pain N Poor Appetite N   Edema N   
Cough N   Abdominal Pain N Sputum N   Joint Pain N   
SOB/RIGGS N   Pruritis/Rash N   
Nausea/vomit N   Tolerating PT/OT NA Diarrhea N   Tolerating Diet Y Constipation N   Other Could NOT obtain due to:   
Patient denies any nausea vomiting lightheadedness dizziness dyspnea orthopnea paroxysmal nocturnal dyspnea chest pain palpitations headache focal weakness loss of sensation auditory or visual symptoms abdominal stool or urinary complaints or any other associated symptoms. Objective: VITALS:  
Last 24hrs VS reviewed since prior progress note. Most recent are: 
Patient Vitals for the past 24 hrs: 
 Temp Pulse Resp BP SpO2  
21 0814 98.4 °F (36.9 °C) 69 18 (!) 154/62 100 % 21 0458 98.1 °F (36.7 °C) 68 16 (!) 145/60 100 % 21 2013 99.4 °F (37.4 °C) 74 17 (!) 134/56 98 % 21 1638  73  (!) 123/54   
21 1600  73     
21 1500  73  (!) 146/56  No intake or output data in the 24 hours ending 21 1449 PHYSICAL EXAM: 
General: Patient appears comfortable EENT:  EOMI. Anicteric sclerae. MMM Resp:  CTA bilaterally, no wheezing or rales. No accessory muscle use CV:  Regular  rhythm,  No edema GI:  Soft, Non distended, Non tender. +Bowel sounds Neurologic:  Alert and oriented X 3, normal speech, Psych:   Good insight. Not anxious nor agitated Skin:  No rashes. No jaundice Procedures: see electronic medical records for all procedures/Xrays and details which were not copied into this note but were reviewed prior to creation of Plan. LABS: 
I reviewed today's most current labs and imaging studies. Pertinent labs include: 
Recent Labs  
  02/25/21 
0420 02/24/21 
1800 WBC 14.0* 14.5* HGB 9.0* 9.9*  
HCT 28.9* 31.2*  
* 118* Recent Labs  
  02/25/21 
0420 02/24/21 
1600  135* K 3.7 4.3  100 CO2 24 25 * 252* BUN 38* 33* CREA 4.78* 4.31* CA 8.9 9.6 ALB 3.1* 3.7 TBILI 0.4 0.3 ALT 15 19 Signed: Annie Clements MD 
 
Reviewed most current lab test results and cultures  YES Reviewed most current radiology test results   YES Review and summation of old records today    NO Reviewed patient's current orders and MAR    YES 
PMH/ reviewed - no change compared to H&P 
______________________________________ Assessment/Plan: 
 
Sepsis 2/2 gram positive bacteremiapatient presents with above-mentioned symptomatology found to meet sepsis criteria given the fact the patient was febrile with evidence of leukocytosis however with gram positive bacteremia, of note patient cannot receive fluid resuscitation as per sepsis protocol secondary to being end-stage renal disease on hemodialysis could be secondary to infected graft Follow-up blood cultures Follow up TTE Continue Vancomycin and ceftaroline for antibiotic coverage Infectious disease consult appreciated, will continue to follow 
  
End-stage renal disease on hemodialysispatient has a history of end-stage renal disease on hemodialysis with concerns of clotting of fistula Nephrology consult appreciated, will continue to follow 
  
Hyperglycemia due to type 2 diabetescontinue insulin sliding scale at this time, recalculate insulin requirement according to 24-hour coverage Constipation -of note patient has not had a bowel movement since Tuesday, patient requesting enema Administer enema stat Continue to monitor for bowel movements 
  
Hypertensioncontinue home antihypertensive medications 
  
Hyperlipidemiacontinue statin 
  
ProphylaxisHeparin subcu FENrenal diet, potassium and magnesium to be repleted with dialysis Full code, Disposition- Pending clinical improvement 
__________________________________ Care Plan discussed with: 
  Comments Patient X Family  x   
RN X   
Care Manager X Consultant  X   
                 X Multidiciplinary team rounds were held today with , nursing, pharmacist and clinical coordinator. Patient's plan of care was discussed; medications were reviewed and discharge planning was addressed. ________________________________________________________________________ Total NON critical care TIME:  35   Minutes Comments >50% of visit spent in counseling and coordination of care X   
________________________________________________________________________ Lupe Son MD

## 2021-02-28 NOTE — ROUTINE PROCESS
Bedside and verbal shift change report given to Alejandro Galan RN (oncoming nurse) by Nae Cohen RN (offgoing nurse). Report included the following information SBAR, Kardex, Intake/Output, MAR, Recent Results, and Quality Measures.

## 2021-03-01 NOTE — PROGRESS NOTES
Hospitalist Progress Note NAME: Chau Kay :  1955 MRN:  [id number] Subjective: Chief Complaint / Reason for Physician Visit Patient seen and evaluated at bedside, of note patient has had several episodes of non-billous/non-projectile/non-bloody vomiting. Discussed with RN events overnight. Review of Systems: 
Symptom Y/N Comments  Symptom Y/N Comments Fever/Chills Y   Chest Pain N Poor Appetite N   Edema N   
Cough N   Abdominal Pain N Sputum N   Joint Pain N   
SOB/RIGGS N   Pruritis/Rash N   
Nausea/vomit N   Tolerating PT/OT NA Diarrhea N   Tolerating Diet Y Constipation N   Other Could NOT obtain due to:   
Patient denies any nausea vomiting lightheadedness dizziness dyspnea orthopnea paroxysmal nocturnal dyspnea chest pain palpitations headache focal weakness loss of sensation auditory or visual symptoms abdominal stool or urinary complaints or any other associated symptoms. Objective: VITALS:  
Last 24hrs VS reviewed since prior progress note. Most recent are: 
Patient Vitals for the past 24 hrs: 
 Temp Pulse Resp BP SpO2  
21 0742 97.6 °F (36.4 °C) 65 18 138/61 99 % 21 0330 98.7 °F (37.1 °C) 72 17 (!) 137/93 99 % 21 1959 97.9 °F (36.6 °C) 67 18 (!) 132/55 100 % 21 1453 98.1 °F (36.7 °C) 66 18 (!) 143/60 98 % No intake or output data in the 24 hours ending 21 1239 PHYSICAL EXAM: 
General: Patient appears uncomfortable EENT:  EOMI. Anicteric sclerae. MMM Resp:  CTA bilaterally, no wheezing or rales. No accessory muscle use CV:  Regular  rhythm,  No edema GI:  Soft, Non distended, Non tender. +Bowel sounds Neurologic:  Alert and oriented X 3, normal speech, Psych:   Good insight. Not anxious nor agitated Skin:  No rashes. No jaundice Procedures: see electronic medical records for all procedures/Xrays and details which were not copied into this note but were reviewed prior to creation of Plan.  
LABS: 
I reviewed today's most current labs and imaging studies. 
Pertinent labs include: 
Recent Labs  
  02/25/21 
0420 02/24/21 
1800  
WBC 14.0* 14.5*  
HGB 9.0* 9.9*  
HCT 28.9* 31.2*  
* 118*  
 
Recent Labs  
  02/25/21 
0420 02/24/21 
1600  
 135*  
K 3.7 4.3  
 100  
CO2 24 25  
* 252*  
BUN 38* 33*  
CREA 4.78* 4.31*  
CA 8.9 9.6  
ALB 3.1* 3.7  
TBILI 0.4 0.3  
ALT 15 19  
 
 
Signed: Melani Sapp MD 
 
Reviewed most current lab test results and cultures  YES 
Reviewed most current radiology test results   YES 
Review and summation of old records today    NO 
Reviewed patient's current orders and MAR    YES 
PMH/ reviewed - no change compared to H&P 
______________________________________ 
 
 
Assessment/Plan: 
 
Sepsis 2/2 gram positive bacteremia–patient presents with above-mentioned symptomatology found to meet sepsis criteria given the fact the patient was febrile with evidence of leukocytosis however with gram positive bacteremia, of note patient cannot receive fluid resuscitation as per sepsis protocol secondary to being end-stage renal disease on hemodialysis could be secondary to infected graft 
Follow-up repeat blood cultures for sensitivities 
Continue Vancomycin and ceftaroline for antibiotic coverage 
Infectious disease consult appreciated, will continue to follow 
  
End-stage renal disease on hemodialysis–patient has a history of end-stage renal disease on hemodialysis with concerns of clotting of fistula 
Nephrology consult appreciated, will continue to follow 
  
Hyperglycemia due to type 2 diabetes–continue insulin sliding scale at this time, recalculate insulin requirement according to 24-hour coverage 
 
Constipation -of note patient has not had a bowel movement since Tuesday, patient requesting enema 
Administer enema stat 
Continue to monitor for bowel movements 
  
Hypertension–continue home antihypertensive medications 
  
 Hyperlipidemiacontinue statin 
  
ProphylaxisHeparin subcu FENrenal diet, potassium and magnesium to be repleted with dialysis Full code, Disposition- Pending clinical improvement 
__________________________________ Care Plan discussed with: 
  Comments Patient X Family  x   
RN X   
Care Manager X Consultant  X   
                 X Multidiciplinary team rounds were held today with , nursing, pharmacist and clinical coordinator. Patient's plan of care was discussed; medications were reviewed and discharge planning was addressed. ________________________________________________________________________ Total NON critical care TIME:  35   Minutes Comments >50% of visit spent in counseling and coordination of care X   
________________________________________________________________________ Gianna Curran MD

## 2021-03-01 NOTE — PROGRESS NOTES
Progress Note Patient: Remberto Arciniega MRN: [id number]  SSN: [id number] YOB: 1955  Age: 77 y.o. Sex: female Admit Date: 2/24/2021 LOS: 5 days Subjective:  
Patient followed for sepsis with persistent MRSA bacteremia currently on Vancomycin and Ceftaroline. She is now afebrile with decreasing WBC, procal and CRP. Blood cultures repeated again this morning. SAYDA ordered because of abnormal tricuspid valve. Patient resting comfortably with no new complaints. Objective:  
 
Vitals:  
 02/28/21 1453 02/28/21 1959 03/01/21 0330 03/01/21 4032 BP: (!) 143/60 (!) 132/55 (!) 137/93 138/61 Pulse: 66 67 72 65 Resp: 18 18 17 18 Temp: 98.1 °F (36.7 °C) 97.9 °F (36.6 °C) 98.7 °F (37.1 °C) 97.6 °F (36.4 °C) TempSrc:      
SpO2: 98% 100% 99% 99% Weight:      
Height:      
  
 
Intake and Output: 
Current Shift: No intake/output data recorded. Last three shifts: No intake/output data recorded. Physical Exam:  
 Constitutional:   
   General: She is not in acute distress. Appearance: She is obese. She is ill-appearing. HENT:  
   Head: Normocephalic and atraumatic. Nose: Nose normal.  
   Mouth/Throat:  
   Pharynx: Oropharynx is clear. Eyes:  
   Pupils: Pupils are equal, round, and reactive to light. Cardiovascular:  
   Rate and Rhythm: Normal rate and regular rhythm. Heart sounds: No murmur. Pulmonary:  
   Breath sounds: No wheezing, rhonchi or rales. Genitourinary: 
   Comments: No Ann Musculoskeletal:  
   Right lower leg: No edema. Left lower leg: No edema. Comments: Left upper arm graft without tenderness or erythema, no drainage Skin: 
   Findings: No rash. Neurological:  
   General: No focal deficit present. Mental Status: She is alert and oriented to person, place, and time.   
Psychiatric:     
   Mood and Affect: Mood normal.     
   Behavior: Behavior normal.     
 Thought Content: Thought content normal.     
   Judgment: Judgment normal. 
 
Lab/Data Review: WBC 13,100 Procalcitonin 41.16 < 128.41 <180.97  
CRP 15.60 <17.60 <16.60 Blood cultures (2/24) MRSA Blood cultures (2/25) MRSA Blood cultures (2/26) MRSA Blood cultures (2/27) MRSA Blood cultures (3/1)  Pending 
 
TTE (2/28)  Tricuspid valve with possible echogenic mobile linear densities. SAYDA recommended. Assessment:  
 
Active Problems: 
  ESRD on dialysis ([facility] [address] (2/24/2021) Sepsis ([facility] [address] (2/24/2021) 
 
  
1. Sepsis with fever, leukocytosis and elevated lactic acid, procalcitonin and CRP 2. Persistent MRSA bacteremia, Day #6 IV Vancomycin, Day  #4 Ceftaroline 3. Presumptive infected AV graft left arm, secondary to above 4. Possible vegetation on the tricuspid valve 5. ESRD on HD 6. Covid-19 negative Plan: 1. Continue IV Vancomycin and Ceftaroline 200 mg IV q12 hours for synergy 2. Reviewed TTE results with patient 3. Awaiting Cardiology evaluation for SAYDA 
4. In am, repeat CBC, procal and CRP, repeat blood cultures Signed By: Alida Hernández MD   
 March 1, 2021

## 2021-03-01 NOTE — PROGRESS NOTES
Renal progress Note NAME:  Mariela French :   1955 MRN:   [id number] Date/Time:  3/1/2021 1:08 PM 
 
 
Subjective:  
Patient seen in the room. She is c/o feeling tired. REVIEW OF SYSTEMS:    
 
Constitutional: Negative for chills and fever. HENT: Negative. Eyes: Negative. Respiratory: Negative. Cardiovascular: Negative. Gastrointestinal: Negative for abdominal pain and nausea. Skin: Negative. Neurological: Negative. Objective: VITALS:   
Visit Vitals /61 (BP 1 Location: Right upper arm, BP Patient Position: At rest) Pulse 65 Temp 97.6 °F (36.4 °C) Resp 18 Ht 5' 3\" (1.6 m) Wt 89.4 kg (197 lb 1.5 oz) SpO2 99% BMI 34.91 kg/m² Temp (24hrs), Av.1 °F (36.7 °C), Min:97.6 °F (36.4 °C), Max:98.7 °F (37.1 °C) PHYSICAL EXAM:  
General:    Alert, cooperative, no distress, appears stated age. HEENT: Atraumatic, anicteric sclerae, pink conjunctivae No oral ulcers, mucosa moist, throat clear Neck:  Supple, symmetrical,  thyroid: non tender Lungs:   Clear to auscultation bilaterally. No Wheezing or Rhonchi. No rales. Chest wall:  No tenderness  No Accessory muscle use. Heart:   Regular  rhythm,  No  murmur   No edema Abdomen:   Soft, non-tender. Not distended. Bowel sounds normal 
Extremities: No cyanosis. No clubbing. Left arm AV graft with no visible erythema over the skin. However a scab seen from the bleeding site Skin:     Not pale. Not Jaundiced  No rashes Psych:  Good insight. Not depressed. Not anxious or agitated. Neurologic: EOMs intact. No facial asymmetry. No aphasia or slurred speech. Symmetrical strength, Alert and oriented X 4. LAB DATA REVIEWED:   
Recent Results (from the past 24 hour(s)) GLUCOSE, POC Collection Time: 21  2:55 PM  
Result Value Ref Range Glucose (POC) 163 (H) 65 - 100 mg/dL Performed by Robbie Pickett, POC  Collection Time: 21  8:08 PM  
 Result Value Ref Range Glucose (POC) 151 (H) 65 - 100 mg/dL Performed by Amanda Birch GLUCOSE, POC Collection Time: 03/01/21  7:39 AM  
Result Value Ref Range Glucose (POC) 266 (H) 65 - 100 mg/dL Performed by [facility] RENAL FUNCTION PANEL Collection Time: 03/01/21  8:38 AM  
Result Value Ref Range Sodium 123 (L) 136 - 145 mmol/L Potassium 3.4 (L) 3.5 - 5.1 mmol/L Chloride 87 (L) 97 - 108 mmol/L  
 CO2 21 21 - 32 mmol/L Anion gap 15 5 - 15 mmol/L Glucose 265 (H) 65 - 100 mg/dL BUN 53 (H) 6 - 20 mg/dL Creatinine 5.11 (H) 0.55 - 1.02 mg/dL BUN/Creatinine ratio 10 (L) 12 - 20 GFR est AA 10 (L) >60 ml/min/1.73m2 GFR est non-AA 8 (L) >60 ml/min/1.73m2 Calcium 8.7 8.5 - 10.1 mg/dL Phosphorus 4.4 2.6 - 4.7 mg/dL Albumin 2.6 (L) 3.5 - 5.0 g/dL METABOLIC PANEL, BASIC Collection Time: 03/01/21  8:38 AM  
Result Value Ref Range Sodium 123 (L) 136 - 145 mmol/L Potassium 3.4 (L) 3.5 - 5.1 mmol/L Chloride 87 (L) 97 - 108 mmol/L  
 CO2 22 21 - 32 mmol/L Anion gap 14 5 - 15 mmol/L Glucose 262 (H) 65 - 100 mg/dL BUN 52 (H) 6 - 20 mg/dL Creatinine 5.21 (H) 0.55 - 1.02 mg/dL BUN/Creatinine ratio 10 (L) 12 - 20 GFR est AA 10 (L) >60 ml/min/1.73m2 GFR est non-AA 8 (L) >60 ml/min/1.73m2 Calcium 8.7 8.5 - 10.1 mg/dL MAGNESIUM Collection Time: 03/01/21  8:38 AM  
Result Value Ref Range Magnesium 2.5 (H) 1.6 - 2.4 mg/dL CBC WITH AUTOMATED DIFF Collection Time: 03/01/21  8:38 AM  
Result Value Ref Range WBC 13.4 (H) 3.6 - 11.0 K/uL  
 RBC 2.83 (L) 3.80 - 5.20 M/uL HGB 8.4 (L) 11.5 - 16.0 g/dL HCT 25.8 (L) 35.0 - 47.0 % MCV 91.2 80.0 - 99.0 FL  
 MCH 29.7 26.0 - 34.0 PG  
 MCHC 32.6 30.0 - 36.5 g/dL  
 RDW 14.2 11.5 - 14.5 % PLATELET 578 533 - 128 K/uL MPV 13.0 (H) 8.9 - 12.9 FL  
 NEUTROPHILS 74 32 - 75 % LYMPHOCYTES 11 (L) 12 - 49 % MONOCYTES 14 (H) 5 - 13 % EOSINOPHILS 0 0 - 7 % BASOPHILS 0 0 - 1 % IMMATURE GRANULOCYTES 1 (H) 0.0 - 0.5 % ABS. NEUTROPHILS 10.0 (H) 1.8 - 8.0 K/UL  
 ABS. LYMPHOCYTES 1.5 0.8 - 3.5 K/UL  
 ABS. MONOCYTES 1.8 (H) 0.0 - 1.0 K/UL  
 ABS. EOSINOPHILS 0.1 0.0 - 0.4 K/UL  
 ABS. BASOPHILS 0.0 0.0 - 0.1 K/UL  
 ABS. IMM. GRANS. 0.2 (H) 0.00 - 0.04 K/UL  
 DF AUTOMATED    
C REACTIVE PROTEIN, QT Collection Time: 03/01/21  8:38 AM  
Result Value Ref Range C-Reactive protein 15.60 (H) 0.00 - 0.60 mg/dL PROCALCITONIN Collection Time: 03/01/21  8:38 AM  
Result Value Ref Range Procalcitonin 41.16 (H) 0 ng/mL GLUCOSE, POC Collection Time: 03/01/21 11:38 AM  
Result Value Ref Range Glucose (POC) 232 (H) 65 - 100 mg/dL Performed by [facility] Recommendations/Plan:  
#1 ESRD 
-She goes for dialysis at [facility] every TTS 
-She was dialyzed yesterday per schedule with 2 L fluid removal 
-Left arm AV graft is current access 
-No uremic or volume overload symptoms 
-Next dialysis will be on Tuesday per schedule  
-Continue TTS schedule in the hospital 
 
#2 renal osteodystrophy 
-Not noted to be on any phosphorus binders. We will check phosphorus with next labs. Obtain PTH from outpatient and continue Zemplar if indicated 
-Calcium is okay #3 anemia 
-hemoglobin is 9.2. Anemia of chronic kidney disease 
-Continue Procrit with dialysis #4 sepsis 
-Presented with fever over 103, leukocytosis and elevated lactic acid 
-Positive blood cultures with gram-positive cocci in clusters consistent with staph 
-Possible source is infected left arm AV graft 
-ID has been consulted. Continue IV vancomycin and ceftaroline 
-TTE has been ordered to rule out vegetations 
-Now afebrile. chills have improved after adding ceftaroline for synergy 
-Will probably need long-term antibiotics at least for 6 weeks. can be given on dialysis #5 hypertension -Blood pressure is well controlled with amlodipine and hydralazine which I will continue #6 hyponatremia 
-Sodium today has decreased to 129. Will plan dialysis with high sodium bath. -Restrict fluid intake 
 
 
 
________________________________________________________________________ Signed: Claudene Shores, MD

## 2021-03-01 NOTE — PROGRESS NOTES
PT approached by PCT while on the unit requesting functional level for pt, upon chart review it was determined pt is currently not on caseload, was admitted on 2/24/2021. PT spoke to pt and  who was present in room regarding PLOF. Per pt and family pt was I at home, amb without AD and is currently requiring increased assistance for all mobility due to weakness and dizziness. Pt and family requesting PT/OT evaluations at this time. Attending contacted and orders requested. Thank you.

## 2021-03-01 NOTE — PROGRESS NOTES
Progress Note Patient: Ghada Varma MRN: [id number]  SSN: [id number] YOB: 1955  Age: 77 y.o. Sex: female Admit Date: 2/24/2021 LOS: 4 days Subjective:  
Patient followed for sepsis with positive blood cultures now identified as MRSA with suspected infected graft left arm. Repeat blood cultures also growing MRSA or GPC in clusters. Patient's temperature is trending downward along with WBC and procal.  She is currently on IV Vancomycin and Ceftaroline. Objective:  
 
Vitals:  
 02/28/21 0458 02/28/21 0814 02/28/21 1453 02/28/21 1959 BP: (!) 145/60 (!) 154/62 (!) 143/60 (!) 132/55 Pulse: 68 69 66 67 Resp: 16 18 18 18 Temp: 98.1 °F (36.7 °C) 98.4 °F (36.9 °C) 98.1 °F (36.7 °C) 97.9 °F (36.6 °C) TempSrc:      
SpO2: 100% 100% 98% 100% Weight:      
Height:      
  
 
Intake and Output: 
Current Shift: No intake/output data recorded. Last three shifts: No intake/output data recorded. Physical Exam:  
 Constitutional:   
   General: She is not in acute distress. Appearance: She is obese. She is ill-appearing. HENT:  
   Head: Normocephalic and atraumatic. Nose: Nose normal.  
   Mouth/Throat:  
   Pharynx: Oropharynx is clear. Eyes:  
   Pupils: Pupils are equal, round, and reactive to light. Cardiovascular:  
   Rate and Rhythm: Normal rate and regular rhythm. Heart sounds: No murmur. Pulmonary:  
   Breath sounds: No wheezing, rhonchi or rales. Genitourinary: 
   Comments: No Ann Musculoskeletal:  
   Right lower leg: No edema. Left lower leg: No edema. Comments: Left upper arm graft without tenderness or erythema, no drainage Skin: 
   Findings: No rash. Neurological:  
   General: No focal deficit present. Mental Status: She is alert and oriented to person, place, and time. Psychiatric:     
   Mood and Affect: Mood normal.     
   Behavior: Behavior normal.     
   Thought Content:  Thought content normal.     
   Judgment: Judgment normal. 
 
Lab/Data Review: WBC 13,700 Procalcitonin 128.41 <180.97  
CRP 17.60 <16.60 Blood cultures (2/24) MRSA Blood cultures (2/25) MRSA Blood cultures (2/26) Staphylococcus aureus Blood cultures (2/27) Gram positive cocci in clusters TTE (2/28)  Tricuspid valve with possible echogenic mobile linear densities. SAYDA recommended. Assessment:  
 
Active Problems: 
  ESRD on dialysis ([address] (2/24/2021) Sepsis ([address] (2/24/2021) 
 
  
1. Sepsis with fever, leukocytosis and elevated lactic acid, procalcitonin and CRP 2. Persistent MRSA bacteremia, Day #5 IV Vancomycin, Day  #3 Ceftaroline 3. Probable infected AV graft left arm, secondary to above 4. Possible vegetation on the tricuspid valve 5. ESRD on HD 6. Covid-19 negative Comment:  WBC increased. Will consult Cardiology for SAYDA to confirm tricuspid findings, however, do not feel that this is imperative since will be planning for 6 weeks IV antibiotics because of AV fistula. Plan: 1. Continue IV Vancomycin and Ceftaroline 200 mg IV q12 hours for synergy 2. Consult Cardiology for SAYDA 
3. In am, repeat CBC, procal and CRP, repeat blood cultures Signed By: Reid Riley MD   
 February 28, 2021

## 2021-03-01 NOTE — PROGRESS NOTES
Bedside and Verbal shift change report given to Masood Fong RN (oncoming nurse) by Joseph Galloway RN (offgoing nurse). Report included the following information SBAR and MAR.

## 2021-03-01 NOTE — PROGRESS NOTES
Problem: Falls - Risk of 
Goal: *Absence of Falls Description: Document Benson Cease Fall Risk and appropriate interventions in the flowsheet. Outcome: Progressing Towards Goal 
Note: Fall Risk Interventions: 
Mobility Interventions: Bed/chair exit alarm, Patient to call before getting OOB, PT Consult for mobility concerns, PT Consult for assist device competence Mentation Interventions: Bed/chair exit alarm, Eyeglasses and hearing aids, Family/sitter at bedside, More frequent rounding Medication Interventions: Bed/chair exit alarm, Evaluate medications/consider consulting pharmacy, Patient to call before getting OOB Elimination Interventions: Bed/chair exit alarm, Call light in reach, Patient to call for help with toileting needs Problem: Patient Education: Go to Patient Education Activity Goal: Patient/Family Education Outcome: Progressing Towards Goal 
  
Problem: Risk for Spread of Infection Goal: Prevent transmission of infectious organism to others Description: Prevent the transmission of infectious organisms to other patients, staff members, and visitors. Outcome: Progressing Towards Goal 
  
Problem: Patient Education:  Go to Education Activity Goal: Patient/Family Education Outcome: Progressing Towards Goal 
  
Problem: Diabetes Self-Management Goal: *Disease process and treatment process Description: Define diabetes and identify own type of diabetes; list 3 options for treating diabetes. Outcome: Progressing Towards Goal 
Goal: *Incorporating nutritional management into lifestyle Description: Describe effect of type, amount and timing of food on blood glucose; list 3 methods for planning meals. Outcome: Progressing Towards Goal 
Goal: *Incorporating physical activity into lifestyle Description: State effect of exercise on blood glucose levels. Outcome: Progressing Towards Goal 
Goal: *Developing strategies to promote health/change behavior Description: Define the ABC's of diabetes; identify appropriate screenings, schedule and personal plan for screenings. Outcome: Progressing Towards Goal 
Goal: *Using medications safely Description: State effect of diabetes medications on diabetes; name diabetes medication taking, action and side effects. Outcome: Progressing Towards Goal 
Goal: *Monitoring blood glucose, interpreting and using results Description: Identify recommended blood glucose targets  and personal targets. Outcome: Progressing Towards Goal 
Goal: *Prevention, detection, treatment of acute complications Description: List symptoms of hyper- and hypoglycemia; describe how to treat low blood sugar and actions for lowering  high blood glucose level. Outcome: Progressing Towards Goal 
Goal: *Prevention, detection and treatment of chronic complications Description: Define the natural course of diabetes and describe the relationship of blood glucose levels to long term complications of diabetes. Outcome: Progressing Towards Goal 
Goal: *Developing strategies to address psychosocial issues Description: Describe feelings about living with diabetes; identify support needed and support network Outcome: Progressing Towards Goal 
Goal: *Insulin pump training Outcome: Progressing Towards Goal 
Goal: *Sick day guidelines Outcome: Progressing Towards Goal 
Goal: *Patient Specific Goal (EDIT GOAL, INSERT TEXT) Outcome: Progressing Towards Goal 
  
Problem: Patient Education: Go to Patient Education Activity Goal: Patient/Family Education Outcome: Progressing Towards Goal 
  
Problem: Pressure Injury - Risk of 
Goal: *Prevention of pressure injury Description: Document Romaine Scale and appropriate interventions in the flowsheet. Outcome: Progressing Towards Goal 
Note: Pressure Injury Interventions: Activity Interventions: Increase time out of bed, Pressure redistribution bed/mattress(bed type), PT/OT evaluation Mobility Interventions: HOB 30 degrees or less, Pressure redistribution bed/mattress (bed type), PT/OT evaluation Nutrition Interventions: Document food/fluid/supplement intake Friction and Shear Interventions: HOB 30 degrees or less, Lift sheet, Lift team/patient mobility team 
 
  
 
 
 
  
Problem: Patient Education: Go to Patient Education Activity Goal: Patient/Family Education Outcome: Progressing Towards Goal

## 2021-03-02 NOTE — PROGRESS NOTES
Renal progress Note NAME:  Dalia Lux :   1955 MRN:   [id number] Date/Time:  3/2/2021 1:08 PM 
 
 
Subjective:  
Patient seen in the room. She is c/o feeling tired. Na 123 
K 3.4 Plan for hd today REVIEW OF SYSTEMS:    
 
Constitutional: Negative for chills and fever. HENT: Negative. Eyes: Negative. Respiratory: Negative. Cardiovascular: Negative. Gastrointestinal: Negative for abdominal pain and nausea. Skin: Negative. Neurological: Negative. Objective: VITALS:   
Visit Vitals BP (!) 142/60 (BP 1 Location: Right upper arm, BP Patient Position: At rest) Pulse 65 Temp 98.6 °F (37 °C) Resp 16 Ht 5' 3\" (1.6 m) Wt 87.7 kg (193 lb 5.5 oz) SpO2 96% Comment: room air BMI 34.25 kg/m² Temp (24hrs), Av.6 °F (37 °C), Min:97.9 °F (36.6 °C), Max:99.1 °F (37.3 °C) PHYSICAL EXAM:  
General:    Alert, cooperative, no distress, appears stated age. HEENT: Atraumatic, anicteric sclerae, pink conjunctivae No oral ulcers, mucosa moist, throat clear Neck:  Supple, symmetrical,  thyroid: non tender Lungs:   Clear to auscultation bilaterally. No Wheezing or Rhonchi. No rales. Chest wall:  No tenderness  No Accessory muscle use. Heart:   Regular  rhythm,  No  murmur   No edema Abdomen:   Soft, non-tender. Not distended. Bowel sounds normal 
Extremities: No cyanosis. No clubbing. Left arm AV graft with no visible erythema over the skin. However a scab seen from the bleeding site Skin:     Not pale. Not Jaundiced  No rashes Psych:  Good insight. Not depressed. Not anxious or agitated. Neurologic: EOMs intact. No facial asymmetry. No aphasia or slurred speech. Symmetrical strength, Alert and oriented X 4. LAB DATA REVIEWED:   
Recent Results (from the past 24 hour(s)) GLUCOSE, POC Collection Time: 21 11:38 AM  
Result Value Ref Range Glucose (POC) 232 (H) 65 - 100 mg/dL  Performed by RICKY Carias  
 Collection Time: 03/01/21  3:44 PM  
Result Value Ref Range Glucose (POC) 213 (H) 65 - 100 mg/dL Performed by Ximena Tena, POC Collection Time: 03/01/21  8:54 PM  
Result Value Ref Range Glucose (POC) 139 (H) 65 - 100 mg/dL Performed by Ximena Tena, POC Collection Time: 03/02/21  7:36 AM  
Result Value Ref Range Glucose (POC) 256 (H) 65 - 100 mg/dL Performed by Lucrecia Mark Recommendations/Plan:  
#1 ESRD 
-She goes for dialysis at [facility] every TTS 
-She was dialyzed yesterday per schedule with 2 L fluid removal 
-Left arm AV graft is current access 
-No uremic or volume overload symptoms 
-Next dialysis will be on today per schedule  
-Continue TTS schedule in the hospital 
 
#2 renal osteodystrophy 
-Not noted to be on any phosphorus binders. We will check phosphorus with next labs. Obtain PTH from outpatient and continue Zemplar if indicated 
-Calcium is okay #3 anemia 
-hemoglobin is 9.2. Anemia of chronic kidney disease 
-Continue Procrit with dialysis #4 sepsis 
-Presented with fever over 103, leukocytosis and elevated lactic acid 
-Positive blood cultures with gram-positive cocci in clusters consistent with staph 
-Possible source is infected left arm AV graft 
-ID has been consulted. Continue IV vancomycin and ceftaroline 
-TTE has been ordered to rule out vegetations 
-Now afebrile. chills have improved after adding ceftaroline for synergy 
-Will probably need long-term antibiotics at least for 6 weeks. can be given on dialysis #5 hypertension 
-Blood pressure is well controlled with amlodipine and hydralazine which I will continue #6 hyponatremia 
-Sodium today has decreased to 123. Will plan dialysis with high sodium bath. -Restrict fluid intake #7 Hypokalemia: 
-K 3.4 
-will plan with 4k bath 
 
 
________________________________________________________________________ Signed: Rochelle Hughes MD

## 2021-03-02 NOTE — PROGRESS NOTES
PT consult received and acknowledged . Currently , pt. off the floor for dialysis. PT will reattempt for PT eval. at a later time . Thanks . Mare Bray

## 2021-03-02 NOTE — PROGRESS NOTES
Hospitalist Progress Note NAME: Nurys Cortes :  1955 MRN:  [id number] Subjective: Chief Complaint / Reason for Physician Visit Patient seen and evaluated at bedside Review of Systems: 
Symptom Y/N Comments  Symptom Y/N Comments Fever/Chills Y   Chest Pain N Poor Appetite N   Edema N   
Cough N   Abdominal Pain N Sputum N   Joint Pain N   
SOB/RIGGS N   Pruritis/Rash N   
Nausea/vomit N   Tolerating PT/OT NA Diarrhea N   Tolerating Diet Y Constipation N   Other Could NOT obtain due to:   
Patient denies any nausea vomiting lightheadedness dizziness dyspnea orthopnea paroxysmal nocturnal dyspnea chest pain palpitations headache focal weakness loss of sensation auditory or visual symptoms abdominal stool or urinary complaints or any other associated symptoms. Objective: VITALS:  
Last 24hrs VS reviewed since prior progress note. Most recent are: 
Patient Vitals for the past 24 hrs: 
 Temp Pulse Resp BP SpO2  
21 0336 98.6 °F (37 °C) 65 16 (!) 142/60 96 % 21 0000 98.6 °F (37 °C) 67 18 (!) 159/63 98 % 21 2005 99.1 °F (37.3 °C) 65 18 (!) 133/46 96 % 21 1547 97.9 °F (36.6 °C) 70 18 (!) 162/78 100 % No intake or output data in the 24 hours ending 21 0907 PHYSICAL EXAM: 
General: Patient appears uncomfortable EENT:  EOMI. Anicteric sclerae. MMM Resp:  CTA bilaterally, no wheezing or rales. No accessory muscle use CV:  Regular  rhythm,  No edema GI:  Soft, Non distended, Non tender. +Bowel sounds Neurologic:  Alert and oriented X 3, normal speech, Psych:   Good insight. Not anxious nor agitated Skin:  No rashes. No jaundice Procedures: see electronic medical records for all procedures/Xrays and details which were not copied into this note but were reviewed prior to creation of Plan. LABS: 
I reviewed today's most current labs and imaging studies. Pertinent labs include: 
Recent Labs  
  21 0137 02/24/21 
1800 WBC 14.0* 14.5* HGB 9.0* 9.9*  
HCT 28.9* 31.2*  
* 118* Recent Labs  
  02/25/21 
0420 02/24/21 
1600  135* K 3.7 4.3  100 CO2 24 25 * 252* BUN 38* 33* CREA 4.78* 4.31* CA 8.9 9.6 ALB 3.1* 3.7 TBILI 0.4 0.3 ALT 15 19 Signed: Zee Hopson MD 
 
Reviewed most current lab test results and cultures  YES Reviewed most current radiology test results   YES Review and summation of old records today    NO Reviewed patient's current orders and MAR    YES 
PMH/SH reviewed - no change compared to H&P 
______________________________________ Assessment/Plan: 
 
Sepsis 2/2 gram positive bacteremia 
patient presents with above-mentioned symptomatology found to meet sepsis criteria given the fact the patient was febrile with evidence of leukocytosis however with gram positive bacteremia, of note patient cannot receive fluid resuscitation as per sepsis protocol secondary to being end-stage renal disease on hemodialysis could be secondary to infected graft. Follow-up repeat blood cultures for sensitivities Continue Vancomycin and ceftaroline for antibiotic coverage Infectious disease consult appreciated, will continue to follow Cardio cx for SAYDA 
  
End-stage renal disease on hemodialysispatient has a history of end-stage renal disease on hemodialysis with concerns of clotting of fistula Nephrology consult appreciated, will continue to follow 
  
Hyperglycemia due to type 2 diabetescontinue insulin sliding scale at this time, recalculate insulin requirement according to 24-hour coverage Constipation -of note patient has not had a bowel movement since Tuesday, Bowel regimen Continue to monitor for bowel movements 
  
Hypertensioncontinue home antihypertensive medications 
  
Hyperlipidemiacontinue statin 
  
ProphylaxisHeparin subcu FENrenal diet, potassium and magnesium to be repleted with dialysis Full code,  
 
 Disposition- Pending clinical improvement 
__________________________________ Care Plan discussed with: 
  Comments Patient X Family RN X   
Care Manager Consultant  X Multidiciplinary team rounds were held today with , nursing, pharmacist and clinical coordinator. Patient's plan of care was discussed; medications were reviewed and discharge planning was addressed. ________________________________________________________________________ Total NON critical care TIME:  30   Minutes Comments >50% of visit spent in counseling and coordination of care X   
________________________________________________________________________ Jodie Bone MD

## 2021-03-02 NOTE — PROGRESS NOTES
Vancomycin Update:03/02/21 Day:7 
Pre HD level was not drawn. Pt last received One dose of Vanc 1250 on 02/27. Pt is currently in Dialysis. Scheduled One dose of Vanc 1250 for tonight and Random for 03/04 at 0400

## 2021-03-02 NOTE — PROGRESS NOTES
Progress Note Patient: Piper Dorantes MRN: [id number]  SSN: [id number] YOB: 1955  Age: 77 y.o. Sex: female Admit Date: 2/24/2021 LOS: 6 days Subjective:  
Patient followed for sepsis with persistent MRSA bacteremia currently on Vancomycin and Ceftaroline. She is now afebrile with decreasing WBC, procal and CRP. Repeat blood cultlures growing GPC in clusters. Awaiting SAYDA. Patient resting comfortably with no new complaints. Objective:  
 
Vitals:  
 03/02/21 1055 03/02/21 1336 03/02/21 1354 03/02/21 1355 BP: (!) 159/67 (!) 136/58  (!) 136/58 Pulse: 71   71 Resp: 16 16  18 Temp:    97.5 °F (36.4 °C) TempSrc:      
SpO2:      
Weight:   184 lb 4.9 oz (83.6 kg) Height:      
  
 
Intake and Output: 
Current Shift: No intake/output data recorded. Last three shifts: No intake/output data recorded. Physical Exam:  
 Constitutional:   
   General: She is not in acute distress. Appearance: She is obese. She is ill-appearing. HENT:  
   Head: Normocephalic and atraumatic. Nose: Nose normal.  
   Mouth/Throat:  
   Pharynx: Oropharynx is clear. Eyes:  
   Pupils: Pupils are equal, round, and reactive to light. Cardiovascular:  
   Rate and Rhythm: Normal rate and regular rhythm. Heart sounds: No murmur. Pulmonary:  
   Breath sounds: No wheezing, rhonchi or rales. Genitourinary: 
   Comments: No Ann Musculoskeletal:  
   Right lower leg: No edema. Left lower leg: No edema. Comments: Left upper arm graft without tenderness or erythema, no drainage Skin: 
   Findings: No rash. Neurological:  
   General: No focal deficit present. Mental Status: She is alert and oriented to person, place, and time. Psychiatric:     
   Mood and Affect: Mood normal.     
   Behavior: Behavior normal.     
   Thought Content: Thought content normal.     
   Judgment: Judgment normal. 
 
Lab/Data Review: WBC 13,100 Procalcitonin 41.16 < 128.41 <180.97  
CRP 15.60 <17.60 <16.60 Blood cultures (2/24) MRSA Blood cultures (2/25) MRSA Blood cultures (2/26) MRSA Blood cultures (2/27) MRSA Blood cultures (3/1) Gram positive cocci in clusters TTE (2/28)  Tricuspid valve with possible echogenic mobile linear densities. SAYDA recommended. Assessment:  
 
Active Problems: 
  ESRD on dialysis ([facility] [address] (2/24/2021) Sepsis ([address] (2/24/2021) 
 
  
1. Sepsis with fever, leukocytosis and elevated lactic acid, procalcitonin and CRP 2. Persistent MRSA bacteremia, Day #7 IV Vancomycin, Day  #5 Ceftaroline 3. Presumptive infected AV graft left arm, secondary to above 4. Possible vegetation on the tricuspid valve 5. ESRD on HD 6. Covid-19 negative Comment:  Patient remain bacteremic despite combination therapy with Vancomycin and Ceftaroline. Endocarditis is a concern. Plan: 1. Continue IV Vancomycin and Ceftaroline 200 mg IV q12 hours for synergy; Consider switching to Daptomycin 2. Awaiting Cardiology evaluation for SAYDA 
3. In am, repeat CBC, procal and CRP, repeat blood cultures Signed By: Reid Riley MD   
 March 2, 2021

## 2021-03-03 NOTE — CONSULTS
Consult NAME: Gissel Castelan :  1955 MRN:  [id number] Date/Time:  3/3/2021 4:55 PM 
 
Patient PCP: Ambar Baxter MD 
________________________________________________________________________ Assessment:  
 
1. Bacterial endocarditis 2. Anemia 3. MRSA bacteremia Plan: 1. Patient is interviewed physical examinations performed. SAYDA could not be done today due to technical reasons. Scheduled for tomorrow  
 
 []        High complexity decision making was performed Subjective: CHIEF COMPLAINT:  
 
HISTORY OF PRESENT ILLNESS:    
Karen Jones is a 77 y.o.  female who who was admitted with bacteremia. Repeat blood cultures grew MRSA. Transthoracic echo did not reveal any vegetation. SAYDA is ordered for further assessment. Patient denies any chest pain palpitation. She has mitral annular calcification and mild mitral stenosis. There is history of coronary artery disease with a stenting in the past.  She has a chronic renal failure and is on hemodialysis. She has type 2 diabetes mellitus hypertension hypercholesterolemia. No history of upper GI bleeding or esophageal stricture. We were asked to consult for work up and evaluation of the above problems. Past Medical History:  
Diagnosis Date  CAD (coronary artery disease) stents x 2  Chronic kidney disease   
 t-th-s/ renal care in [facility] hghts  Diabetes ([facility] [address] type 2  
 Hypercholesterolemia  Hypertension  Morbid obesity ([facility] [address] bmi 35  Renal insufficiency Past Surgical History:  
Procedure Laterality Date  COLONOSCOPY  EGD  HX APPENDECTOMY    HX GASTRIC BYPASS  2009  
 gastric band  HX ORTHOPAEDIC Bilateral   
 ctr  HX VASCULAR ACCESS Left   
 lorenzo cath for dialysis  OR CARDIAC SURG PROCEDURE UNLIST 2 cardiac stents  OR LAP, PLACE ADJUST GASTR BAND  7/29/10 Realize-C  
 OR NEEDLE BIOPSY LIVER,W OTHR PROC  7/29/10  OR REPAIR EPIGASTRIC HERNIA,REDUC  7/29/10 No Known Allergies Meds:  See below Social History Tobacco Use  Smoking status: Never Smoker  Smokeless tobacco: Never Used Substance Use Topics  Alcohol use: No  
  
Family History Problem Relation Age of Onset  Heart Disease Mother  Diabetes Mother  Kidney Disease Mother [employer] Other Mother  Cancer Father  Emphysema Father  Asthma Sister  Heart Disease Sister  Diabetes Child REVIEW OF SYSTEMS:   
 []         Unable to obtain  ROS due to --- 
 [x]         Total of 12 systems reviewed as follows: 
 
Constitutional: negative fever, negative chills, negative weight loss Eyes:   negative visual changes ENT:   negative sore throat, tongue or lip swelling Respiratory:  negative cough, negative dyspnea Cards:  negative for chest pain, palpitations, lower extremity edema GI:   negative for nausea, vomiting, diarrhea, and abdominal pain Genitourinary: negative for frequency, dysuria Integument:  negative for rash Hematologic:  negative for easy bruising and gum/nose bleeding Musculoskel: negative for myalgias,  back pain Neurological:  negative for headaches, dizziness, vertigo, weakness Behavl/Psych: negative for feelings of anxiety, depression Pertinent Positives include : 
 
Objective:  
  
Physical Exam: 
 
Last 24hrs VS reviewed since prior progress note. Most recent are: 
 
Visit Vitals BP (!) 135/52 Pulse 69 Temp 100.3 °F (37.9 °C) Resp 18 Ht 5' 3\" (1.6 m) Wt 84.2 kg (185 lb 10 oz) SpO2 96% BMI 32.88 kg/m² No intake or output data in the 24 hours ending 03/03/21 1655 General Appearance: Well developed, well nourished, alert & oriented x 3,  
 no acute distress. Ears/Nose/Mouth/Throat: Pupils equal and round, Hearing grossly normal. 
Neck: Supple. JVP within normal limits. Carotids good upstrokes, with no bruit. Chest: Lungs clear to auscultation bilaterally.  
Cardiovascular: Regular rate and rhythm, S1S2 normal, no murmur, rubs, gallops. Abdomen: Soft, non-tender, bowel sounds are active. No organomegaly. Extremities: No edema bilaterally. Skin: Warm and dry. Neuro: No localizing neurological deficit[]         Post-cath site without hematoma, bruit, tenderness, or thrill. Distal pulses intact. Data:  
 
 I have reviewed vital signs,labs and ekg independently Telemetry: 
 
EKG: 
  
EKG RESULTS Procedure Component Value Units Date/Time EKG, 12 LEAD, INITIAL [[id number]] Collected: 02/25/21 0043 Order Status: Completed Updated: 02/25/21 1749 Ventricular Rate 95 BPM   
  Atrial Rate 95 BPM   
  P-R Interval 188 ms QRS Duration 76 ms   
  Q-T Interval 384 ms QTC Calculation (Bezet) 482 ms Calculated P Axis 61 degrees Calculated R Axis -6 degrees Calculated T Axis -7 degrees Diagnosis --  
  Normal sinus rhythm Possible Left atrial enlargement Nonspecific ST abnormality Abnormal ECG When compared with ECG of 11-NOV-2019 02:24, 
ST now depressed in Inferior leads Confirmed by Ricardo Lopez ([id number]) on 2/25/2021 5:49:26 PM 
  
  
  
 
XR CHEST PORT Final Result No acute abnormality seen. Echo Results  (Last 48 hours) None Cardiolite (Tc-99m Sestamibi) stress test 
 
XR Results (most recent): 
  
Results from Hospital Encounter encounter on 02/24/21 XR CHEST PORT Narrative History: Evaluate for infiltrate Single view of the chest was obtained. There is a very poor depth of 
inspiration. There is some prominence of the interstitium. No focal 
consolidation is seen. No acute bony abnormality seen. Impression No acute abnormality seen. Prior to Admission medications Medication Sig Start Date End Date Taking?  Authorizing Provider  
insulin glargine (Lantus Solostar U-100 Insulin) 100 unit/mL (3 mL) inpn Decrease to 14 units after 11 am daily 10/9/20  Yes Dyana Simms MD  
aspirin delayed-release 81 mg tablet Take 81 mg by mouth daily. Yes Provider, Historical  
atenoloL (TENORMIN) 50 mg tablet Take 1 Tab by mouth daily. Stop 100 mg dose Patient taking differently: Take 50 mg by mouth daily. Stop 100 mg dose Takes pm 6/8/20  Yes Mica Coello MD  
rosuvastatin (CRESTOR) 10 mg tablet TAKE 1 TABLET NIGHTLY 6/8/20  Yes Mica Coello MD  
amLODIPine-benazepril (LOTREL) 10-40 mg per capsule TAKE 1 CAPSULE DAILY 5/19/19  Yes Mica Coello MD  
glucose blood VI test strips (ONETOUCH ULTRA TEST) strip Test 4 times daily. DX CODE E11.65 12/4/18  Yes Mica Coello MD  
flash glucose scanning reader (FREESTYLE BENEDICT 10 DAY READER) misc Use as directed 10/18/18  Yes Mica Coello MD  
flash glucose sensor (FREESTYLE BENEDICT 10 DAY SENSOR) kit Use it every 10 days 10/18/18  Yes Mica Coello MD  
Blood-Glucose Meter (ONETOUCH ULTRAMINI) monitoring kit Dispense 1 meter. Test 4 times daily. Dx code E11.65 6/22/18  Yes Mica Coello MD  
NYSTOP powder CHANDRAKANT UTD BID EXTERNALLY 1/27/17  Yes Provider, Historical  
Insulin Needles, Disposable, (CELSO PEN NEEDLE) 32 gauge x 5/32\" ndle Use 4 times daily. Dx code E11.65 11/5/15  Yes Mica Coello MD  
Lancets (ONE TOUCH DELICA) misc Test 4 times daily. DX CODE 250.00 1/5/15  Yes Mica Coello MD  
insulin syringe-needle U-100 (INSULIN SYRINGE ULTRA-FINE) 1 mL 31 x 15/64\" syrg 1 Syringe by Does Not Apply route daily. Use once daily 1/24/14  Yes Mica Coello MD  
loratadine (CLARITIN) 10 mg tablet Take 10 mg by mouth daily. Yes Provider, Historical  
hydrALAZINE (APRESOLINE) 25 mg tablet Take 1 Tab by mouth three (3) times daily. 8/8/13  Yes Mica Coello MD  
MULTIVITS,CA,MINERALS/IRON/FA (ONE-A-DAY WOMENS FORMULA PO) Take  by mouth. Yes Provider, Historical  
clopidogreL (Plavix) 75 mg tab Take 75 mg by mouth daily. Provider, Historical  
 
 
Recent Results (from the past 24 hour(s)) GLUCOSE, POC  Collection Time: 03/02/21  8:42 PM  
Result Value Ref Range Glucose (POC) 185 (H) 65 - 100 mg/dL Performed by Signature   
CBC WITH AUTOMATED DIFF Collection Time: 03/03/21  5:55 AM  
Result Value Ref Range WBC 13.8 (H) 3.6 - 11.0 K/uL  
 RBC 2.46 (L) 3.80 - 5.20 M/uL HGB 7.3 (L) 11.5 - 16.0 g/dL HCT 22.8 (L) 35.0 - 47.0 % MCV 92.7 80.0 - 99.0 FL  
 MCH 29.7 26.0 - 34.0 PG  
 MCHC 32.0 30.0 - 36.5 g/dL  
 RDW 14.5 11.5 - 14.5 % PLATELET 326 672 - 250 K/uL MPV 12.1 8.9 - 12.9 FL  
 NEUTROPHILS 72 32 - 75 % LYMPHOCYTES 12 12 - 49 % MONOCYTES 15 (H) 5 - 13 % EOSINOPHILS 2 0 - 7 % BASOPHILS 0 0 - 1 % IMMATURE GRANULOCYTES 3 (H) 0.0 - 0.5 % ABS. NEUTROPHILS 9.9 (H) 1.8 - 8.0 K/UL  
 ABS. LYMPHOCYTES 1.6 0.8 - 3.5 K/UL  
 ABS. MONOCYTES 2.0 (H) 0.0 - 1.0 K/UL  
 ABS. EOSINOPHILS 0.2 0.0 - 0.4 K/UL  
 ABS. BASOPHILS 0.0 0.0 - 0.1 K/UL  
 ABS. IMM. GRANS. 0.4 (H) 0.00 - 0.04 K/UL  
 DF AUTOMATED    
C REACTIVE PROTEIN, QT Collection Time: 03/03/21  5:55 AM  
Result Value Ref Range C-Reactive protein 21.70 (H) 0.00 - 0.60 mg/dL PROCALCITONIN Collection Time: 03/03/21  5:55 AM  
Result Value Ref Range Procalcitonin 17.02 (H) 0 ng/mL METABOLIC PANEL, BASIC Collection Time: 03/03/21  5:55 AM  
Result Value Ref Range Sodium 134 (L) 136 - 145 mmol/L Potassium 3.3 (L) 3.5 - 5.1 mmol/L Chloride 96 (L) 97 - 108 mmol/L  
 CO2 23 21 - 32 mmol/L Anion gap 15 5 - 15 mmol/L Glucose 181 (H) 65 - 100 mg/dL BUN 35 (H) 6 - 20 mg/dL Creatinine 4.69 (H) 0.55 - 1.02 mg/dL BUN/Creatinine ratio 7 (L) 12 - 20 GFR est AA 11 (L) >60 ml/min/1.73m2 GFR est non-AA 9 (L) >60 ml/min/1.73m2 Calcium 8.6 8.5 - 10.1 mg/dL GLUCOSE, POC Collection Time: 03/03/21  8:06 AM  
Result Value Ref Range Glucose (POC) 214 (H) 65 - 100 mg/dL Performed by Jhoan Lopez GLUCOSE, POC Collection Time: 03/03/21  3:12 PM  
Result Value Ref Range Glucose (POC) 211 (H) 65 - 100 mg/dL  Performed by SERAFIN MCFADDEN   
GLUCOSE, POC Collection Time: 03/03/21  4:08 PM  
Result Value Ref Range Glucose (POC) 196 (H) 65 - 100 mg/dL Performed by Aly Griffin Patient status is discussed with attending and nursing staff. SAYDA is scheduled for tomorrow Farheen Cristina MD 
 
Patient PCP: MD Viviane Bustamante MD

## 2021-03-03 NOTE — PROGRESS NOTES
Patient awake, alert, assisted with hearing aid to the left ear. Potassium run initiated as ordered by cardiologist. Patient requesting water. Made aware has to be NPO for the Procedure. Reassure will check on diet when she returns.

## 2021-03-03 NOTE — PROGRESS NOTES
Renal progress Note NAME:  Judy Santamaria :   1955 MRN:   [id number] Date/Time:  3/3/2021 1:08 PM 
 
 
Subjective:  
Patient seen in the room. She feels better Na 123-->134 
K 3.3 REVIEW OF SYSTEMS:    
 
Constitutional: Negative for chills and fever. HENT: Negative. Eyes: Negative. Respiratory: Negative. Cardiovascular: Negative. Gastrointestinal: Negative for abdominal pain and nausea. Skin: Negative. Neurological: Negative. Objective: VITALS:   
Visit Vitals BP (!) 135/52 Pulse 69 Temp 100.3 °F (37.9 °C) Resp 18 Ht 5' 3\" (1.6 m) Wt 84.2 kg (185 lb 10 oz) SpO2 96% BMI 32.88 kg/m² Temp (24hrs), Av.8 °F (37.1 °C), Min:96.7 °F (35.9 °C), Max:100.3 °F (37.9 °C) PHYSICAL EXAM:  
General:    Alert, cooperative, no distress, appears stated age. HEENT: Atraumatic, anicteric sclerae, pink conjunctivae No oral ulcers, mucosa moist, throat clear Neck:  Supple, symmetrical,  thyroid: non tender Lungs:   Clear to auscultation bilaterally. No Wheezing or Rhonchi. No rales. Chest wall:  No tenderness  No Accessory muscle use. Heart:   Regular  rhythm,  No  murmur   No edema Abdomen:   Soft, non-tender. Not distended. Bowel sounds normal 
Extremities: No cyanosis. No clubbing. Left arm AV graft with no visible erythema over the skin. However a scab seen from the bleeding site Skin:     Not pale. Not Jaundiced  No rashes Psych:  Good insight. Not depressed. Not anxious or agitated. Neurologic: EOMs intact. No facial asymmetry. No aphasia or slurred speech. Symmetrical strength, Alert and oriented X 4. LAB DATA REVIEWED:   
Recent Results (from the past 24 hour(s)) GLUCOSE, POC Collection Time: 21  8:42 PM  
Result Value Ref Range Glucose (POC) 185 (H) 65 - 100 mg/dL Performed by Nia Show   
CBC WITH AUTOMATED DIFF Collection Time: 21  5:55 AM  
Result Value Ref Range  WBC 13.8 (H) 3.6 - 11.0 K/uL  
 RBC 2.46 (L) 3.80 - 5.20 M/uL HGB 7.3 (L) 11.5 - 16.0 g/dL HCT 22.8 (L) 35.0 - 47.0 % MCV 92.7 80.0 - 99.0 FL  
 MCH 29.7 26.0 - 34.0 PG  
 MCHC 32.0 30.0 - 36.5 g/dL  
 RDW 14.5 11.5 - 14.5 % PLATELET 914 688 - 255 K/uL MPV 12.1 8.9 - 12.9 FL  
 NEUTROPHILS 72 32 - 75 % LYMPHOCYTES 12 12 - 49 % MONOCYTES 15 (H) 5 - 13 % EOSINOPHILS 2 0 - 7 % BASOPHILS 0 0 - 1 % IMMATURE GRANULOCYTES 3 (H) 0.0 - 0.5 % ABS. NEUTROPHILS 9.9 (H) 1.8 - 8.0 K/UL  
 ABS. LYMPHOCYTES 1.6 0.8 - 3.5 K/UL  
 ABS. MONOCYTES 2.0 (H) 0.0 - 1.0 K/UL  
 ABS. EOSINOPHILS 0.2 0.0 - 0.4 K/UL  
 ABS. BASOPHILS 0.0 0.0 - 0.1 K/UL  
 ABS. IMM. GRANS. 0.4 (H) 0.00 - 0.04 K/UL  
 DF AUTOMATED    
C REACTIVE PROTEIN, QT Collection Time: 03/03/21  5:55 AM  
Result Value Ref Range C-Reactive protein 21.70 (H) 0.00 - 0.60 mg/dL PROCALCITONIN Collection Time: 03/03/21  5:55 AM  
Result Value Ref Range Procalcitonin 17.02 (H) 0 ng/mL METABOLIC PANEL, BASIC Collection Time: 03/03/21  5:55 AM  
Result Value Ref Range Sodium 134 (L) 136 - 145 mmol/L Potassium 3.3 (L) 3.5 - 5.1 mmol/L Chloride 96 (L) 97 - 108 mmol/L  
 CO2 23 21 - 32 mmol/L Anion gap 15 5 - 15 mmol/L Glucose 181 (H) 65 - 100 mg/dL BUN 35 (H) 6 - 20 mg/dL Creatinine 4.69 (H) 0.55 - 1.02 mg/dL BUN/Creatinine ratio 7 (L) 12 - 20 GFR est AA 11 (L) >60 ml/min/1.73m2 GFR est non-AA 9 (L) >60 ml/min/1.73m2 Calcium 8.6 8.5 - 10.1 mg/dL GLUCOSE, POC Collection Time: 03/03/21  8:06 AM  
Result Value Ref Range Glucose (POC) 214 (H) 65 - 100 mg/dL Performed by Cheikh Mistry GLUCOSE, POC Collection Time: 03/03/21  3:12 PM  
Result Value Ref Range Glucose (POC) 211 (H) 65 - 100 mg/dL Performed by Xenia Fleming, POC Collection Time: 03/03/21  4:08 PM  
Result Value Ref Range Glucose (POC) 196 (H) 65 - 100 mg/dL Performed by Zuri Deshpande Recommendations/Plan: #1 ESRD 
-She goes for dialysis at [facility] every TTS 
-She was dialyzed yesterday per schedule with 2 L fluid removal 
-Left arm AV graft is current access 
-No uremic or volume overload symptoms 
-Next dialysis will be on today per schedule  
-Continue TTS schedule in the hospital 
 
#2 renal osteodystrophy 
-Not noted to be on any phosphorus binders. We will check phosphorus with next labs. Obtain PTH from outpatient and continue Zemplar if indicated 
-Calcium is okay #3 anemia 
-hemoglobin is 9.2. Anemia of chronic kidney disease 
-Continue Procrit with dialysis #4 sepsis 
-Presented with fever over 103, leukocytosis and elevated lactic acid 
-Positive blood cultures with gram-positive cocci in clusters consistent with staph 
-Possible source is infected left arm AV graft 
-ID has been consulted. Continue IV vancomycin and ceftaroline 
-TTE has been ordered to rule out vegetations 
-Now afebrile. chills have improved after adding ceftaroline for synergy 
-Will probably need long-term antibiotics at least for 6 weeks. can be given on dialysis #5 hypertension 
-Blood pressure is well controlled with amlodipine and hydralazine which I will continue #6 hyponatremia 
-Sodium today has decreased to 123. Will plan dialysis with high sodium bath. -Restrict fluid intake #7 Hypokalemia: 
-K 3.4 
-will plan with 4k bath 
 
 
________________________________________________________________________ Signed: Caitie Joyce MD

## 2021-03-03 NOTE — PROGRESS NOTES
OT eval order received and acknowledged. OT eval attempted at 10:20 am however, pt being transferred off floor to PACU. Will continue to follow patient and attempt OT at a later time. Thank you

## 2021-03-03 NOTE — PROGRESS NOTES
Progress Note Patient: Ghada Varma MRN: [id number]  SSN: [id number] YOB: 1955  Age: 77 y.o. Sex: female Admit Date: 2/24/2021 LOS: 7 days Subjective:  
Patient followed for sepsis with persistent MRSA bacteremia currently on Vancomycin and Ceftaroline. She is now afebrile with decreasing WBC, procal and CRP. Repeat blood cultlures growing GPC in clusters. Seen by Cardiology with SAYDA planned for tomorrow. Patient resting comfortably with no new complaints. Objective:  
 
Vitals:  
 03/03/21 0403 03/03/21 0804 03/03/21 0900 03/03/21 1521 BP:  (!) 164/62 (!) 135/52 (!) 135/52 Pulse:  69 69 69 Resp:  18  18 Temp:  (!) 96.7 °F (35.9 °C)  100.3 °F (37.9 °C) TempSrc:      
SpO2:  96%  96% Weight: 185 lb 10 oz (84.2 kg) Height:      
  
 
Intake and Output: 
Current Shift: No intake/output data recorded. Last three shifts: 03/01 1901 - 03/03 0700 In: -  
Out: 2500 Physical Exam:  
 Constitutional:   
   General: She is not in acute distress. Appearance: She is obese. She is ill-appearing. HENT:  
   Head: Normocephalic and atraumatic. Nose: Nose normal.  
   Mouth/Throat:  
   Pharynx: Oropharynx is clear. Eyes:  
   Pupils: Pupils are equal, round, and reactive to light. Cardiovascular:  
   Rate and Rhythm: Normal rate and regular rhythm. Heart sounds: No murmur. Pulmonary:  
   Breath sounds: No wheezing, rhonchi or rales. Genitourinary: 
   Comments: No Ann Musculoskeletal:  
   Right lower leg: No edema. Left lower leg: No edema. Comments: Left upper arm graft without tenderness or erythema, no drainage Skin: 
   Findings: No rash. Neurological:  
   General: No focal deficit present. Mental Status: She is alert and oriented to person, place, and time. Psychiatric:     
   Mood and Affect: Mood normal.     
   Behavior: Behavior normal.     
   Thought Content:  Thought content normal.     
 Judgment: Judgment normal. 
 
Lab/Data Review: WBC 13,800 Procalcitonin 17.02 < 41.16 < 128.41 <180.97  
CRP 21.70 <15.60 <17.60 <16.60 Blood cultures (2/24) MRSA Blood cultures (2/25) MRSA Blood cultures (2/26) MRSA Blood cultures (2/27) MRSA Blood cultures (3/1) MRSA Blood cultures (3/3) Pending 
 
TTE (2/28)  Tricuspid valve with possible echogenic mobile linear densities. SAYDA recommended. Assessment:  
 
Active Problems: 
  ESRD on dialysis ([facility] [address] (2/24/2021) Sepsis ([facility] [address] (2/24/2021) 
 
  
1. Sepsis with fever, leukocytosis and elevated lactic acid, procalcitonin and CRP 2. Persistent MRSA bacteremia, Day #8 IV Vancomycin, Day  #6 Ceftaroline 3. Presumptive infected AV graft left arm, secondary to above 4. Possible vegetation on the tricuspid valve, SAYDA pending 5. ESRD on HD 6. Covid-19 negative Comment:  Patient remain bacteremic despite combination therapy with Vancomycin and Ceftaroline. Endocarditis is a concern. Persistent leukocytosis with decreasing procal but increasing CRP. Plan: 1. Continue IV Vancomycin and Ceftaroline 200 mg IV q12 hours for synergy; Consider switching to Daptomycin if repeat blood cultures positive 2. SAYDA planned for tomorrow 3. In am, repeat CBC, procal and CRP, repeat blood cultures Signed By: Marilou Rollins MD   
 March 3, 2021

## 2021-03-03 NOTE — ANESTHESIA PREPROCEDURE EVALUATION
Anesthetic History No history of anesthetic complications Review of Systems / Medical History Patient summary reviewed, nursing notes reviewed and pertinent labs reviewed Pulmonary Within defined limits Neuro/Psych Within defined limits Cardiovascular Hypertension: well controlled CAD, cardiac stents and hyperlipidemia Exercise tolerance: <4 METS Comments: SAYDA: 
· LV: Calculated LVEF is 65%. Normal cavity size and systolic function (ejection fraction normal). Mild concentric hypertrophy. Mild (grade 1) left ventricular diastolic dysfunction. · MV: Mitral valve thickening. Mitral valve leaflet calcification. Moderate mitral annular calcification. Moderate mitral valve stenosis is present. · TV: Additional tricuspid valve findings: There is possible vegetation noted. · LA: Mildly dilated left atrium. · IAS: No atrial septal defect present. · AV: There is very mild sclerosis of the aortic valve. GI/Hepatic/Renal 
  
 
 
Renal disease: ESRD and dialysis Comments: Hx of gastric bypass Endo/Other Diabetes: poorly controlled, type 2, using insulin Obesity and anemia Other Findings Past Medical History:  
Diagnosis Date  CAD (coronary artery disease) stents x 2  Chronic kidney disease   
 t-th-s/ renal care in [facility] hghts  Diabetes ([facility] [address] type 2  
 Hypercholesterolemia  Hypertension  Morbid obesity ([facility] [address] bmi 35  Renal insufficiency Past Surgical History:  
Procedure Laterality Date  COLONOSCOPY  EGD  HX APPENDECTOMY  2019  HX GASTRIC BYPASS  2009  
 gastric band  HX ORTHOPAEDIC Bilateral   
 ctr  HX VASCULAR ACCESS Left 2020  
 lorenzo cath for dialysis  DC CARDIAC SURG PROCEDURE UNLIST 2 cardiac stents  DC LAP, PLACE ADJUST GASTR BAND  7/29/10 [facility]  
 DC NEEDLE BIOPSY LIVER,W OTHR PROC  7/29/10 [address]  7/29/10 Current Outpatient Medications Medication Instructions  amLODIPine-benazepril (LOTREL) 10-40 mg per capsule TAKE 1 CAPSULE DAILY  aspirin delayed-release 81 mg, Oral, DAILY  atenoloL (TENORMIN) 50 mg, Oral, DAILY, Stop 100 mg dose  Blood-Glucose Meter (ONETOUCH ULTRAMINI) monitoring kit Dispense 1 meter. Test 4 times daily. Dx code E11.65  
 clopidogreL (PLAVIX) 75 mg, Oral, DAILY  flash glucose scanning reader (FREESTYLE BENEDICT 10 DAY READER) misc Use as directed  flash glucose sensor (FREESTYLE BENEDICT 10 DAY SENSOR) kit Use it every 10 days  glucose blood VI test strips (ONETOUCH ULTRA TEST) strip Test 4 times daily. DX CODE E11.65  
 hydrALAZINE (APRESOLINE) 25 mg, Oral, 3 TIMES DAILY  insulin glargine (Lantus Solostar U-100 Insulin) 100 unit/mL (3 mL) inpn Decrease to 14 units after 11 am daily  Insulin Needles, Disposable, (CELSO PEN NEEDLE) 32 gauge x 5/32\" ndle Use 4 times daily. Dx code E11.65  
 insulin syringe-needle U-100 (INSULIN SYRINGE ULTRA-FINE) 1 mL 31 x 15/64\" syrg 1 Syringe, Does Not Apply, DAILY, Use once daily  Lancets (ONE TOUCH DELICA) misc Test 4 times daily. DX CODE 250.00  loratadine (CLARITIN) 10 mg, DAILY  MULTIVITS,CA,MINERALS/IRON/FA (ONE-A-DAY WOMENS FORMULA PO) Take  by mouth.  NYSTOP powder CHANDRAKANT UTD BID EXTERNALLY  rosuvastatin (CRESTOR) 10 mg tablet TAKE 1 TABLET NIGHTLY Current Facility-Administered Medications Medication Dose Route Frequency  metoclopramide HCl (REGLAN) injection 10 mg  10 mg IntraVENous Q8H PRN  prochlorperazine (COMPAZINE) with saline injection 5 mg  5 mg IntraVENous Q6H PRN  
 cefTARoline (TEFLARO) 200 mg in 0.9% sodium chloride 50 mL IVPB  200 mg IntraVENous Q12H  
 insulin glargine (LANTUS) injection 6 Units  6 Units SubCUTAneous QHS  insulin lispro (HUMALOG) injection 4 Units  4 Units SubCUTAneous TIDAC  sodium chloride (NS) flush 5-10 mL  5-10 mL IntraVENous PRN  
 amLODIPine-benazepril (LOTREL) 2.5-10 mg per capsule 4 Cap  4 Cap Oral DAILY  aspirin delayed-release tablet 81 mg  81 mg Oral DAILY  atenoloL (TENORMIN) tablet 50 mg  50 mg Oral DAILY  clopidogreL (PLAVIX) tablet 75 mg  75 mg Oral DAILY  hydrALAZINE (APRESOLINE) tablet 25 mg  25 mg Oral TID  loratadine (CLARITIN) tablet 10 mg  10 mg Oral DAILY  multivitamin, tx-iron-ca-min (THERA-M w/ IRON) tablet 1 Tab  1 Tab Oral DAILY  atorvastatin (LIPITOR) tablet 20 mg  20 mg Oral QHS  glucose chewable tablet 16 g  4 Tab Oral PRN  
 dextrose (D50W) injection syrg 12.5-25 g  25-50 mL IntraVENous PRN  
 glucagon (GLUCAGEN) injection 1 mg  1 mg IntraMUSCular PRN  
 insulin lispro (HUMALOG) injection   SubCUTAneous AC&HS  sodium chloride (NS) flush 5-40 mL  5-40 mL IntraVENous Q8H  
 sodium chloride (NS) flush 5-40 mL  5-40 mL IntraVENous PRN  
 acetaminophen (TYLENOL) tablet 650 mg  650 mg Oral Q6H PRN Or  
 acetaminophen (TYLENOL) suppository 650 mg  650 mg Rectal Q6H PRN  polyethylene glycol (MIRALAX) packet 17 g  17 g Oral DAILY PRN  
 heparin (porcine) injection 5,000 Units  5,000 Units SubCUTAneous Q12H  VANCOMYCIN INFORMATION NOTE   Other Rx Dosing/Monitoring Patient Vitals for the past 24 hrs: 
 Temp Pulse Resp BP SpO2  
03/03/21 0804 (!) 35.9 °C (96.7 °F) 69 18 (!) 164/62 96 % 03/02/21 2048 37.4 °C (99.3 °F) 66 18 (!) 131/46 94 % 03/02/21 1632 37.1 °C (98.8 °F) 72 18 (!) 133/54   
03/02/21 1355 36.4 °C (97.5 °F) 71 18 (!) 136/58   
03/02/21 1336   16 (!) 136/58   
03/02/21 1225  72  (!) 142/68  Lab Results Component Value Date/Time WBC 13.8 (H) 03/03/2021 05:55 AM  
 HGB 7.3 (L) 03/03/2021 05:55 AM  
 Hematocrit (POC) 32 (L) 09/02/2020 07:06 AM  
 HCT 22.8 (L) 03/03/2021 05:55 AM  
 PLATELET 230 19/15/4800 05:55 AM  
 MCV 92.7 03/03/2021 05:55 AM  
 
Lab Results Component Value Date/Time  Sodium 134 (L) 03/03/2021 05:55 AM  
 Potassium 3.3 (L) 03/03/2021 05:55 AM  
 Chloride 96 (L) 03/03/2021 05:55 AM  
 CO2 23 03/03/2021 05:55 AM  
 Anion gap 15 03/03/2021 05:55 AM  
 Glucose 181 (H) 03/03/2021 05:55 AM  
 BUN 35 (H) 03/03/2021 05:55 AM  
 Creatinine 4.69 (H) 03/03/2021 05:55 AM  
 BUN/Creatinine ratio 7 (L) 03/03/2021 05:55 AM  
 GFR est AA 11 (L) 03/03/2021 05:55 AM  
 GFR est non-AA 9 (L) 03/03/2021 05:55 AM  
 Calcium 8.6 03/03/2021 05:55 AM  
 
No results found for: APTT, PTP, INR, INREXT Lab Results Component Value Date/Time Glucose 181 (H) 03/03/2021 05:55 AM  
 Glucose (POC) 214 (H) 03/03/2021 08:06 AM  
 
Physical Exam 
 
Airway Mallampati: III 
TM Distance: 4 - 6 cm Neck ROM: normal range of motion Mouth opening: Normal 
 
 Cardiovascular Rhythm: regular Rate: normal 
 
 
 
 Dental 
 
Dentition: Upper dentition intact and Lower dentition intact Pulmonary Decreased breath sounds: bibasilar Abdominal 
Abdominal exam normal 
 
 
 Other Findings Anesthetic Plan ASA: 4 Anesthesia type: total IV anesthesia and general 
 
 
 
 
Induction: Intravenous Anesthetic plan and risks discussed with: Patient and Family General anesthesia was prescribed for this patient because by definition it is \"a drug-induced loss of consciousness during which patients are not arousable, even by painful stimulation. \" Sometimes, the ability to independently maintain ventilatory function is often impaired and patients often require assistance in maintaining a patent airway. Occasionally, positive pressure ventilation may be required because of depressed spontaneous ventilation or drug-induced depression of neuromuscular function. This depth of anesthesia is preferred for endoscopic procedures to facilitate the procedure and for patient safety/quality of care.

## 2021-03-03 NOTE — PROGRESS NOTES
Comprehensive Nutrition Assessment Type and Reason for Visit: RD nutrition re-screen/LOS Nutrition Recommendations/Plan:  
Resume renal diet with consistent carb/diabetic restriction when medically appropriate - currently NPO [place] pt preferences Continue bowel regimen Please document % intake of all meals/snacks, BMs, emesis Obtain measured weight Nutrition Assessment:  Admitted for sepsis, MRSA - on vancomycin. ESRD on HD TTS. Was ordered renal diet - currently NPO. Pt reports consuming 50% of meals throughout admission with fair appetite. Denies nutrition intervantion at this time. Labs and meds reviewed, BG regularly elevated >150 mg/dl. Malnutrition Assessment: 
Malnutrition Status:  No malnutrition Nutrition Related Findings:  Appears well-nourished, visibly in pain during visit 3/2. NFPE not performed at this time. Denies c/s difficulties. Reports N/V daily; last emesis 3/2 per pt, 2/26 per EMR. Endorses constipation; last BM 2/27. Wounds:   
None Current Nutrition Therapies: DIET NPO Anthropometric Measures: 
· Height:  5' 3\" (160 cm) · Current Body Wt:  84.2 kg (185 lb 10 oz)(3/3) · Usual Body Wt:  91.6 kg (202 lb) · Ideal Body Wt:  115 lbs:  161.4 % · BMI Category:  Obese class 1 (BMI 30.0-34. 9) Nutrition Diagnosis: No nutrition diagnosis at this time Nutrition Interventions:  
Food and/or Nutrient Delivery: Start oral diet Nutrition Education and Counseling: No recommendations at this time Coordination of Nutrition Care: Continue to monitor while inpatient Nutrition Monitoring and Evaluation:  
Behavioral-Environmental Outcomes: None identified Food/Nutrient Intake Outcomes: Food and nutrient intake Physical Signs/Symptoms Outcomes: Weight, Nausea/vomiting, Constipation Discharge Planning:   
No discharge needs at this time Electronically signed by Nunu Villalta RD on 3/3/2021 at 10:01 AM 
 
Contact: Ext [phone]

## 2021-03-03 NOTE — PROGRESS NOTES
Hospitalist Progress Note NAME: Chau Kay :  1955 MRN:  [id number] Subjective: Chief Complaint / Reason for Physician Visit 
 persistent mrsa bacteremia Ashish deferred today- scheduled for tomorrow Afebrile 
hgb to 7.3 Review of Systems: 
Symptom Y/N Comments  Symptom Y/N Comments Fever/Chills Y   Chest Pain N Poor Appetite N   Edema N   
Cough N   Abdominal Pain N Sputum N   Joint Pain N   
SOB/RIGGS N   Pruritis/Rash N   
Nausea/vomit N   Tolerating PT/OT NA Diarrhea N   Tolerating Diet Y Constipation N   Other Could NOT obtain due to:   
Patient denies any nausea vomiting lightheadedness dizziness dyspnea orthopnea paroxysmal nocturnal dyspnea chest pain palpitations headache focal weakness loss of sensation auditory or visual symptoms abdominal stool or urinary complaints or any other associated symptoms. Objective: VITALS:  
Last 24hrs VS reviewed since prior progress note. Most recent are: 
Patient Vitals for the past 24 hrs: 
 Temp Pulse Resp BP SpO2  
21 0804 (!) 96.7 °F (35.9 °C) 69 18 (!) 164/62 96 % 21 2048 99.3 °F (37.4 °C) 66 18 (!) 131/46 94 % 21 1632 98.8 °F (37.1 °C) 72 18 (!) 133/54   
21 1355 97.5 °F (36.4 °C) 71 18 (!) 136/58  No intake or output data in the 24 hours ending 21 1343 PHYSICAL EXAM: 
General: nad EENT:  EOMI. Anicteric sclerae. MMM Resp:  CTA bilaterally, no wheezing or rales. No accessory muscle use CV:  Regular  rhythm,  No edema GI:  Soft, Non distended, Non tender. +Bowel sounds Neurologic:  Alert and oriented X 3, normal speech, Psych:   Good insight. Not anxious nor agitated Skin:  No rashes. No jaundice Procedures: see electronic medical records for all procedures/Xrays and details which were not copied into this note but were reviewed prior to creation of Plan. LABS: 
I reviewed today's most current labs and imaging studies.  
Pertinent labs include: 
Recent Results (from the past 12 hour(s)) GLUCOSE, POC Collection Time: 03/03/21  8:06 AM  
Result Value Ref Range Glucose (POC) 214 (H) 65 - 100 mg/dL Performed by Sheyla Quintero GLUCOSE, POC Collection Time: 03/03/21  3:12 PM  
Result Value Ref Range Glucose (POC) 211 (H) 65 - 100 mg/dL Performed by Arnoldo Lr, POC Collection Time: 03/03/21  4:08 PM  
Result Value Ref Range Glucose (POC) 196 (H) 65 - 100 mg/dL Performed by Jose Leong Reviewed most current lab test results and cultures  YES Reviewed most current radiology test results   YES Review and summation of old records today    NO Reviewed patient's current orders and MAR    YES 
PMH/ reviewed - no change compared to H&P 
______________________________________ Assessment/Plan: 
 
Sepsis 2/2 mrsa bacteremia 
patient presents with above-mentioned symptomatology found to meet sepsis criteria given the fact the patient was febrile with evidence of leukocytosis however with gram positive bacteremia, of note patient cannot receive fluid resuscitation as per sepsis protocol secondary to being end-stage renal disease on hemodialysis could be secondary to infected graft. Follow-up repeat blood cultures for sensitivities Continue Vancomycin and ceftaroline for antibiotic coverage Infectious disease consult appreciated, will continue to follow Cardio cx for SAYDA, 3/4 anticipated 
  
End-stage renal disease on hemodialysispatient has a history of end-stage renal disease on hemodialysis with concerns of clotting of fistula Nephrology consult appreciated, will continue to follow 
  
Hyperglycemia due to type 2 diabetescontinue insulin sliding scale at this time, recalculate insulin requirement according to 24-hour coverage Constipation Bowel regimen Continue to monitor for bowel movements 
  
Hypertensioncontinue home antihypertensive medications 
  
Hyperlipidemiacontinue statin 
  ProphylaxisHeparin subcu FENrenal diet, potassium and magnesium to be repleted with dialysis Full code, Disposition- Pending clinical improvement, persistent mrsa bactermia, follow pending cultures, darinel anticipated 3/4.__________________________________ Care Plan discussed with: 
  Comments Patient X Family RN X   
Care Manager x Consultant  X Multidiciplinary team rounds were held today with , nursing, pharmacist and clinical coordinator. Patient's plan of care was discussed; medications were reviewed and discharge planning was addressed. ________________________________________________________________________ Total NON critical care TIME:  30   Minutes Comments >50% of visit spent in counseling and coordination of care X   
________________________________________________________________________ Jodie Randall MD

## 2021-03-04 NOTE — ANESTHESIA PREPROCEDURE EVALUATION
Anesthetic History No history of anesthetic complications Review of Systems / Medical History Patient summary reviewed, nursing notes reviewed and pertinent labs reviewed Pulmonary Within defined limits Neuro/Psych Within defined limits Cardiovascular Hypertension: well controlled CAD, cardiac stents and hyperlipidemia Exercise tolerance: <4 METS Comments: SAYDA: 
· LV: Calculated LVEF is 65%. Normal cavity size and systolic function (ejection fraction normal). Mild concentric hypertrophy. Mild (grade 1) left ventricular diastolic dysfunction. · MV: Mitral valve thickening. Mitral valve leaflet calcification. Moderate mitral annular calcification. Moderate mitral valve stenosis is present. · TV: Additional tricuspid valve findings: There is possible vegetation noted. · LA: Mildly dilated left atrium. · IAS: No atrial septal defect present. · AV: There is very mild sclerosis of the aortic valve. GI/Hepatic/Renal 
  
 
 
Renal disease: ESRD and dialysis Comments: Hx of gastric bypass Endo/Other Diabetes: poorly controlled, type 2, using insulin Obesity and anemia Other Findings Past Medical History:  
Diagnosis Date  CAD (coronary artery disease) stents x 2  Chronic kidney disease   
 t-th-s/ renal care in [facility] hghts  Diabetes ([facility] [address] type 2  
 Hypercholesterolemia  Hypertension  Morbid obesity ([facility] [address] bmi 35  Renal insufficiency Past Surgical History:  
Procedure Laterality Date  COLONOSCOPY  EGD  HX APPENDECTOMY  2019  HX GASTRIC BYPASS  2009  
 gastric band  HX ORTHOPAEDIC Bilateral   
 ctr  HX VASCULAR ACCESS Left 2020  
 lorenzo cath for dialysis  SD CARDIAC SURG PROCEDURE UNLIST 2 cardiac stents  SD LAP, PLACE ADJUST GASTR BAND  7/29/10 [facility]  
 SD NEEDLE BIOPSY LIVER,W OTHR PROC  7/29/10 [address] [facility]  7/29/10 Current Outpatient Medications Medication Instructions  amLODIPine-benazepril (LOTREL) 10-40 mg per capsule TAKE 1 CAPSULE DAILY  aspirin delayed-release 81 mg, Oral, DAILY  atenoloL (TENORMIN) 50 mg, Oral, DAILY, Stop 100 mg dose  Blood-Glucose Meter (ONETOUCH ULTRAMINI) monitoring kit Dispense 1 meter. Test 4 times daily. Dx code E11.65  
 clopidogreL (PLAVIX) 75 mg, Oral, DAILY  flash glucose scanning reader (FREESTYLE BENEDICT 10 DAY READER) misc Use as directed  flash glucose sensor (FREESTYLE BENEDICT 10 DAY SENSOR) kit Use it every 10 days  glucose blood VI test strips (ONETOUCH ULTRA TEST) strip Test 4 times daily. DX CODE E11.65  
 hydrALAZINE (APRESOLINE) 25 mg, Oral, 3 TIMES DAILY  insulin glargine (Lantus Solostar U-100 Insulin) 100 unit/mL (3 mL) inpn Decrease to 14 units after 11 am daily  Insulin Needles, Disposable, (CELSO PEN NEEDLE) 32 gauge x 5/32\" ndle Use 4 times daily. Dx code E11.65  
 insulin syringe-needle U-100 (INSULIN SYRINGE ULTRA-FINE) 1 mL 31 x 15/64\" syrg 1 Syringe, Does Not Apply, DAILY, Use once daily  Lancets (ONE TOUCH DELICA) misc Test 4 times daily. DX CODE 250.00  loratadine (CLARITIN) 10 mg, DAILY  MULTIVITS,CA,MINERALS/IRON/FA (ONE-A-DAY WOMENS FORMULA PO) Take  by mouth.  NYSTOP powder CHANDRAKANT UTD BID EXTERNALLY  rosuvastatin (CRESTOR) 10 mg tablet TAKE 1 TABLET NIGHTLY No current facility-administered medications for this visit. No current outpatient medications on file. Facility-Administered Medications Ordered in Other Visits Medication Dose Route Frequency  [START ON 3/6/2021] vancomycin (VANCOCIN) 1,000 mg in 0.9% sodium chloride 250 mL (VIAL-MATE)  1,000 mg IntraVENous ONCE  
 [START ON 3/9/2021] Vancomycin random level 3/9 at 04:00 pre-HD   Other ONCE  potassium chloride (KLOR-CON) packet for solution 40 mEq  40 mEq Oral BID WITH MEALS  metoclopramide HCl (REGLAN) injection 10 mg  10 mg IntraVENous Q8H PRN  
 prochlorperazine (COMPAZINE) with saline injection 5 mg  5 mg IntraVENous Q6H PRN  
 cefTARoline (TEFLARO) 200 mg in 0.9% sodium chloride 50 mL IVPB  200 mg IntraVENous Q12H  
 insulin glargine (LANTUS) injection 6 Units  6 Units SubCUTAneous QHS  insulin lispro (HUMALOG) injection 4 Units  4 Units SubCUTAneous TIDAC  sodium chloride (NS) flush 5-10 mL  5-10 mL IntraVENous PRN  
 amLODIPine-benazepril (LOTREL) 2.5-10 mg per capsule 4 Cap  4 Cap Oral DAILY  aspirin delayed-release tablet 81 mg  81 mg Oral DAILY  atenoloL (TENORMIN) tablet 50 mg  50 mg Oral DAILY  clopidogreL (PLAVIX) tablet 75 mg  75 mg Oral DAILY  hydrALAZINE (APRESOLINE) tablet 25 mg  25 mg Oral TID  loratadine (CLARITIN) tablet 10 mg  10 mg Oral DAILY  multivitamin, tx-iron-ca-min (THERA-M w/ IRON) tablet 1 Tab  1 Tab Oral DAILY  atorvastatin (LIPITOR) tablet 20 mg  20 mg Oral QHS  glucose chewable tablet 16 g  4 Tab Oral PRN  
 dextrose (D50W) injection syrg 12.5-25 g  25-50 mL IntraVENous PRN  
 glucagon (GLUCAGEN) injection 1 mg  1 mg IntraMUSCular PRN  
 insulin lispro (HUMALOG) injection   SubCUTAneous AC&HS  sodium chloride (NS) flush 5-40 mL  5-40 mL IntraVENous Q8H  
 sodium chloride (NS) flush 5-40 mL  5-40 mL IntraVENous PRN  
 acetaminophen (TYLENOL) tablet 650 mg  650 mg Oral Q6H PRN Or  
 acetaminophen (TYLENOL) suppository 650 mg  650 mg Rectal Q6H PRN  polyethylene glycol (MIRALAX) packet 17 g  17 g Oral DAILY PRN  
 heparin (porcine) injection 5,000 Units  5,000 Units SubCUTAneous Q12H  VANCOMYCIN INFORMATION NOTE   Other Rx Dosing/Monitoring No data found. Lab Results Component Value Date/Time WBC 13.9 (H) 03/04/2021 04:15 AM  
 HGB 7.3 (L) 03/04/2021 04:15 AM  
 Hematocrit (POC) 32 (L) 09/02/2020 07:06 AM  
 HCT 23.1 (L) 03/04/2021 04:15 AM  
 PLATELET 173 32/40/2292 04:15 AM  
 MCV 93.9 03/04/2021 04:15 AM  
 
Lab Results Component Value Date/Time Sodium 131 (L) 03/04/2021 04:15 AM  
 Potassium 3.6 03/04/2021 04:15 AM  
 Chloride 96 (L) 03/04/2021 04:15 AM  
 CO2 22 03/04/2021 04:15 AM  
 Anion gap 13 03/04/2021 04:15 AM  
 Glucose 195 (H) 03/04/2021 04:15 AM  
 BUN 50 (H) 03/04/2021 04:15 AM  
 Creatinine 6.25 (H) 03/04/2021 04:15 AM  
 BUN/Creatinine ratio 8 (L) 03/04/2021 04:15 AM  
 GFR est AA 8 (L) 03/04/2021 04:15 AM  
 GFR est non-AA 7 (L) 03/04/2021 04:15 AM  
 Calcium 8.5 03/04/2021 04:15 AM  
 
No results found for: APTT, PTP, INR, INREXT, INREXT Lab Results Component Value Date/Time Glucose 195 (H) 03/04/2021 04:15 AM  
 Glucose (POC) 233 (H) 03/04/2021 07:53 AM  
 
Physical Exam 
 
Airway Mallampati: III 
TM Distance: 4 - 6 cm Neck ROM: normal range of motion Mouth opening: Normal 
 
 Cardiovascular Rhythm: regular Rate: normal 
 
 
 
 Dental 
 
Dentition: Upper dentition intact and Lower dentition intact Pulmonary Decreased breath sounds: bibasilar Abdominal 
Abdominal exam normal 
 
 
 Other Findings Anesthetic Plan ASA: 4 Anesthesia type: total IV anesthesia and general 
 
 
 
 
Induction: Intravenous Anesthetic plan and risks discussed with: Patient and Family General anesthesia was prescribed for this patient because by definition it is \"a drug-induced loss of consciousness during which patients are not arousable, even by painful stimulation. \" Sometimes, the ability to independently maintain ventilatory function is often impaired and patients often require assistance in maintaining a patent airway. Occasionally, positive pressure ventilation may be required because of depressed spontaneous ventilation or drug-induced depression of neuromuscular function. This depth of anesthesia is preferred for endoscopic procedures to facilitate the procedure and for patient safety/quality of care.

## 2021-03-04 NOTE — PROGRESS NOTES
OT orders received. Eval attempted at 8:40am, pt off floor at HD.  Continue to follow for completing eval.

## 2021-03-04 NOTE — PROGRESS NOTES
Consult for Vancomycin Dosing by Pharmacy by Sergio Pace Consult provided for this 77y.o. year old female , for indication of sepsis/PNA Day of Therapy: 9 Goal of Level(s): 15-20mcg/dL Other antibiotics: Ceftaroline Significant lab results: 3/1 blood, all bottles (6), MRSA Results Procedure Component Value Units Date/Time CULTURE, BLOOD [[id number]] Collected: 03/04/21 0415 Order Status: Completed Specimen: Blood Updated: 03/04/21 8783 CULTURE, BLOOD [[id number]] Collected: 03/03/21 0555 Order Status: Completed Specimen: Blood Updated: 03/03/21 8117 CULTURE, BLOOD [[id number]] Collected: 03/01/21 0295 Order Status: Completed Specimen: Blood Updated: 03/03/21 1121 Special Requests: No Special Requests Culture result:    
  Two of two bottles have been flagged positive by instrument. Bottles have been sent to [facility] laboratory to assess for possible growth. Gram Positive Cocci in clusters CALLED TO AND READ BACK BY Edy Francis R.N. [id number] 03/02/21 * methicillin resistant staphylococcus aureus * growing in both bottles drawn REFER TO [id number] FOR SENSITIVITIES  
     
 CULTURE, BLOOD [[id number]] Collected: 02/27/21 0501 Order Status: Completed Specimen: Blood Updated: 03/01/21 1236 Special Requests: No Special Requests Culture result:    
  Two of two bottles have been flagged positive by instrument. Bottles have been sent to [facility] laboratory to assess for possible growth. Gram Positive Cocci in clusters PREVIOUS CALLED POSITIVES * methicillin resistant staphylococcus aureus * GROWING IN AEROBIC AND ANAEROBIC BOTTLES . Blanchie Bevels Blanchie Bevels REFER TO [id number] COLLECTED ON 02/27/2021 FOR SENSITIVITIES FOR  
     
 CULTURE, BLOOD [[id number]]  (Abnormal)  (Susceptibility) Collected: 02/26/21 7286 Order Status: Completed Specimen: Blood Updated: 03/01/21 1045 Special Requests: No Special Requests   Culture result:    
  * methicillin resistant staphylococcus aureus * growing in both bottles drawn SITE NOT INDICATED Two of two bottles have been flagged positive by instrument. Bottles have been sent to [facility] laboratory to assess for possible growth. Gram Positive Cocci in clusters CALLED TO AND READ BACK BY Jersey Laughlin RN [phone] 2/27/21 KNOWN MRSA PATIENT Susceptibility Staphylococcus aureus Methcillin Resistant TYSON Ciprofloxacin ($) Resistant Daptomycin ($$$$$) Susceptible Doxycycline ($$) Susceptible Erythromycin ($$$$) Resistant Gentamicin ($) Susceptible Levofloxacin ($) Intermediate Linezolid ($$$$$) Susceptible Oxacillin Resistant Rifampin ($$$$) Susceptible [1] Tetracycline Susceptible Trimeth/Sulfa Susceptible Vancomycin ($) Susceptible  
      
 
  [1]   Rifampin is not to be used for mono-therapy. CULTURE, BLOOD [[id number]] Collected: 02/25/21 1400 Order Status: Canceled Specimen: Blood CULTURE, BLOOD, PAIRED [[id number]]  (Abnormal) Collected: 02/25/21 1350 Order Status: Completed Specimen: Blood Updated: 02/27/21 1510 Special Requests: No Special Requests Culture result:    
  * methicillin resistant staphylococcus aureus * GROWING IN  4 OF 4 BOTTLES DRAWN. .. LEFT UPPER ARM AV GRAFT SITE REFER TO [id number] FOR SENSITIVITIES PRELIMINARY RESULT OF Gram Positive Cocci in clusters GROWING IN THESE 2 BOTTLES CALLED TO AND READ BACK BY MEGAN Hull AT 4314, 2/26/21 CULTURE, BLOOD, PAIRED [[id number]] Collected: 02/25/21 1330 Order Status: Canceled Specimen: Blood COVID-19 RAPID TEST [[id number]] Collected: 02/24/21 2018 Order Status: Completed Specimen: Nasopharyngeal Updated: 02/24/21 2058 Specimen source Nasopharyngeal     
  COVID-19 rapid test Not Detected Comment: Rapid Abbott ID Now   Rapid NAAT:  The specimen is NEGATIVE for SARS-CoV-2, the novel coronavirus associated with COVID-19. Negative results should be treated as presumptive and, if inconsistent with clinical signs and symptoms or necessary for patient management, should be tested with an alternative molecular assay. Negative results do not preclude SARS-CoV-2 infection and should not be used as the sole basis for patient management decisions. This test has been authorized by the FDA under  
an Emergency Use Authorization (EUA) for use by authorized laboratories. Fact sheet for Healthcare Providers: [employer] BCSdate.co.nz Fact sheet for Patients: Zanneldate.co.nz   Methodology: Isothermal Nucleic Acid Amplification CULTURE, BLOOD [[id number]]  (Susceptibility) Collected: 02/24/21 1600 Order Status: Completed Specimen: Blood Updated: 02/27/21 1001 Special Requests: No Special Requests Culture result:    
  Two of two bottles have been flagged positive by instrument. Bottles have been sent to [facility] laboratory to assess for possible growth. Gram Positive Cocci in clusters CALLED TO AND READ BACK BY MS Jailene Houser 02/25/21 * methicillin resistant staphylococcus aureus * GROWING IN AEROBIC AND ANAEROBIC BOTTLES NO SITE INDICATED Susceptibility Staphylococcus aureus Methcillin Resistant TYSON Ciprofloxacin ($) Resistant Daptomycin ($$$$$) Susceptible Doxycycline ($$) Susceptible Erythromycin ($$$$) Resistant Gentamicin ($) Susceptible Levofloxacin ($) Intermediate Linezolid ($$$$$) Susceptible Oxacillin Resistant Rifampin ($$$$) Susceptible Tetracycline Susceptible Trimeth/Sulfa Susceptible Vancomycin ($) Susceptible CULTURE, BLOOD [[id number]]  (Abnormal) Collected: 02/24/21 1600 Order Status: Completed Specimen: Blood Updated: 03/01/21 1010 Special Requests: No Special Requests   Culture result:    
  * methicillin resistant staphylococcus aureus * GROWING IN AEROBIC ANND ANAEROBIC BOTTLES NO SITE INDICATED REFER TO [id number] FOR SENSITIVITIES Serum Creatinine Creatinine Date Value Ref Range Status 03/04/2021 6.25 (H) 0.55 - 1.02 mg/dL Final  
  Comment:  
  Investigated per delta check protocol 03/03/2021 4.69 (H) 0.55 - 1.02 mg/dL Final  
03/01/2021 5.11 (H) 0.55 - 1.02 mg/dL Final  
03/01/2021 5.21 (H) 0.55 - 1.02 mg/dL Final  
  
Creatinine Clearance Estimated Creatinine Clearance: 9.2 mL/min (A) (based on SCr of 6.25 mg/dL (H)). BUN Lab Results Component Value Date/Time BUN 50 (H) 03/04/2021 04:15 AM  
 BUN (POC) 34 (H) 09/02/2020 07:06 AM  
  
WBC Lab Results Component Value Date/Time WBC 13.9 (H) 03/04/2021 04:15 AM  
  
Temp 99.3 °F (37.4 °C) (Oral) Last Level: No results found for: [facility] Vancomycin, random Date Value Ref Range Status 03/04/2021 28.2 ug/mL Final  
  Comment: No reference range has been established. Ht Readings from Last 1 Encounters:  
03/02/21 160 cm (63\") Wt Readings from Last 1 Encounters:  
03/04/21 88.8 kg (195 lb 12.3 oz) Ideal body weight: 52.4 kg (115 lb 8.3 oz) Adjusted ideal body weight: 67 kg (147 lb 9.9 oz) New Regimen: Vancomycin 1250g IV given post-HD 3/2. Random level today 28.2 pre-HD, NO vancomycin dose today. Will order vancomycin 1000mg IV x 1 post-HD on 3/6. Next random level 3/9 AM pre-HD. Pharmacy to follow daily and will make changes to dose and/or frequency based on clinical status. _________________________________ Pharmacist MICAELA Resendez JASON [facility]

## 2021-03-04 NOTE — ROUTINE PROCESS
Pt. Transferred back to room via bed in stable condition with tele. Box [address]. Bedside report given to [facility], primary RN. SBAR reviewed. IV sites viewed. Pt.'s spouse in room.

## 2021-03-04 NOTE — PROGRESS NOTES
Renal progress Note NAME:  Juliette Napier :   1955 MRN:   [id number] Date/Time:  3/4/2021 1:08 PM 
 
 
Subjective:  
Patient seen in the room. She feels better Na 123-->131 
K 3.6 REVIEW OF SYSTEMS:    
 
Constitutional: Negative for chills and fever. HENT: Negative. Eyes: Negative. Respiratory: Negative. Cardiovascular: Negative. Gastrointestinal: Negative for abdominal pain and nausea. Skin: Negative. Neurological: Negative. Objective: VITALS:   
Visit Vitals BP (!) 133/59 Pulse 74 Temp 99.3 °F (37.4 °C) (Oral) Resp 19 Ht 5' 3\" (1.6 m) Wt 88.8 kg (195 lb 12.3 oz) SpO2 94% BMI 34.68 kg/m² Temp (24hrs), Av.2 °F (37.3 °C), Min:97.6 °F (36.4 °C), Max:100.3 °F (37.9 °C) PHYSICAL EXAM:  
General:    Alert, cooperative, no distress, appears stated age. HEENT: Atraumatic, anicteric sclerae, pink conjunctivae No oral ulcers, mucosa moist, throat clear Neck:  Supple, symmetrical,  thyroid: non tender Lungs:   Clear to auscultation bilaterally. No Wheezing or Rhonchi. No rales. Chest wall:  No tenderness  No Accessory muscle use. Heart:   Regular  rhythm,  No  murmur   No edema Abdomen:   Soft, non-tender. Not distended. Bowel sounds normal 
Extremities: No cyanosis. No clubbing. Left arm AV graft with no visible erythema over the skin. However a scab seen from the bleeding site Skin:     Not pale. Not Jaundiced  No rashes Psych:  Good insight. Not depressed. Not anxious or agitated. Neurologic: EOMs intact. No facial asymmetry. No aphasia or slurred speech. Symmetrical strength, Alert and oriented X 4. LAB DATA REVIEWED:   
Recent Results (from the past 24 hour(s)) GLUCOSE, POC Collection Time: 21  3:12 PM  
Result Value Ref Range Glucose (POC) 211 (H) 65 - 100 mg/dL Performed by Francesco Franklin, POC Collection Time: 21  4:08 PM  
Result Value Ref Range  Glucose (POC) 196 (H) 65 - 100 mg/dL Performed by Ruth Jeffery POC Collection Time: 03/03/21  8:33 PM  
Result Value Ref Range Glucose (POC) 235 (H) 65 - 100 mg/dL Performed by Matt Granda Collection Time: 03/04/21  4:15 AM  
Result Value Ref Range Vancomycin, random 28.2 ug/mL METABOLIC PANEL, BASIC Collection Time: 03/04/21  4:15 AM  
Result Value Ref Range Sodium 131 (L) 136 - 145 mmol/L Potassium 3.6 3.5 - 5.1 mmol/L Chloride 96 (L) 97 - 108 mmol/L  
 CO2 22 21 - 32 mmol/L Anion gap 13 5 - 15 mmol/L Glucose 195 (H) 65 - 100 mg/dL BUN 50 (H) 6 - 20 mg/dL Creatinine 6.25 (H) 0.55 - 1.02 mg/dL BUN/Creatinine ratio 8 (L) 12 - 20 GFR est AA 8 (L) >60 ml/min/1.73m2 GFR est non-AA 7 (L) >60 ml/min/1.73m2 Calcium 8.5 8.5 - 10.1 mg/dL CBC WITH AUTOMATED DIFF Collection Time: 03/04/21  4:15 AM  
Result Value Ref Range WBC 13.9 (H) 3.6 - 11.0 K/uL  
 RBC 2.46 (L) 3.80 - 5.20 M/uL HGB 7.3 (L) 11.5 - 16.0 g/dL HCT 23.1 (L) 35.0 - 47.0 % MCV 93.9 80.0 - 99.0 FL  
 MCH 29.7 26.0 - 34.0 PG  
 MCHC 31.6 30.0 - 36.5 g/dL  
 RDW 14.6 (H) 11.5 - 14.5 % PLATELET 186 268 - 226 K/uL MPV 11.8 8.9 - 12.9 FL  
 NEUTROPHILS 68 32 - 75 % LYMPHOCYTES 14 12 - 49 % MONOCYTES 12 5 - 13 % EOSINOPHILS 5 0 - 7 % BASOPHILS 0 0 - 1 % IMMATURE GRANULOCYTES 4 (H) 0.0 - 0.5 % ABS. NEUTROPHILS 9.5 (H) 1.8 - 8.0 K/UL  
 ABS. LYMPHOCYTES 2.0 0.8 - 3.5 K/UL  
 ABS. MONOCYTES 1.7 (H) 0.0 - 1.0 K/UL  
 ABS. EOSINOPHILS 0.6 (H) 0.0 - 0.4 K/UL  
 ABS. BASOPHILS 0.1 0.0 - 0.1 K/UL  
 ABS. IMM. GRANS. 0.5 (H) 0.00 - 0.04 K/UL  
 DF AUTOMATED PROCALCITONIN Collection Time: 03/04/21  4:15 AM  
Result Value Ref Range Procalcitonin 11.66 (H) 0 ng/mL C REACTIVE PROTEIN, QT Collection Time: 03/04/21  4:15 AM  
Result Value Ref Range C-Reactive protein 18.30 (H) 0.00 - 0.60 mg/dL GLUCOSE, POC  Collection Time: 03/04/21  7:53 AM  
Result Value Ref Range Glucose (POC) 233 (H) 65 - 100 mg/dL Performed by Enoc Mayes Recommendations/Plan:  
#1 ESRD 
-She goes for dialysis at [facility] every TTS 
-She was dialyzed yesterday per schedule with 2 L fluid removal 
-Left arm AV graft is current access 
-No uremic or volume overload symptoms 
-Next dialysis will be on today per schedule  
-Continue TTS schedule in the hospital 
 
#2 renal osteodystrophy 
-Not noted to be on any phosphorus binders. We will check phosphorus with next labs. Obtain PTH from outpatient and continue Zemplar if indicated 
-Calcium is okay #3 anemia 
-hemoglobin is 9.2. Anemia of chronic kidney disease 
-Continue Procrit with dialysis #4 sepsis 
-Presented with fever over 103, leukocytosis and elevated lactic acid 
-Positive blood cultures with gram-positive cocci in clusters consistent with staph 
-Possible source is infected left arm AV graft 
-ID has been consulted. Continue IV vancomycin and ceftaroline 
-TTE has been ordered to rule out vegetations 
-Now afebrile. chills have improved after adding ceftaroline for synergy 
-Will probably need long-term antibiotics at least for 6 weeks. can be given on dialysis #5 hypertension 
-Blood pressure is well controlled with amlodipine and hydralazine which I will continue #6 hyponatremia 
-Sodium today has improved 123->131.   
-Will plan dialysis with high sodium bath. -Restrict fluid intake #7 Hypokalemia: 
-K 3.6 
-will plan with 3k bath 
 
 
________________________________________________________________________ Signed: Jomar Will MD

## 2021-03-04 NOTE — ANESTHESIA POSTPROCEDURE EVALUATION
* No procedures listed *. 
 
total IV anesthesia, general 
 
Anesthesia Post Evaluation Multimodal analgesia: multimodal analgesia used between 6 hours prior to anesthesia start to PACU discharge Patient location during evaluation: PACU Patient participation: complete - patient participated Level of consciousness: awake Pain score: 0 Pain management: adequate Airway patency: patent Anesthetic complications: no 
Cardiovascular status: acceptable Respiratory status: acceptable Hydration status: acceptable Post anesthesia nausea and vomiting:  controlled Final Post Anesthesia Temperature Assessment:  Normothermia (36.0-37.5 degrees C) INITIAL Post-op Vital signs:  
Vitals Value Taken Time /45 03/04/21 1311 Temp Pulse 15 03/04/21 1311 Resp 19 03/04/21 1311 SpO2 100 % 03/04/21 1311

## 2021-03-04 NOTE — PROGRESS NOTES
Patient resting quietly, states feels better than yesterday. Dialysis called to check on time patient receiving dialysis, states coming after lunch. Inform Giuliana Page patient is for SAYDA at 1 pm. Reassured patient did not have the test yesterday.

## 2021-03-04 NOTE — PROGRESS NOTES
PHYSICAL THERAPY EVALUATION Patient: Ghada Varma (77 y.o. female) Date: 3/4/2021 Primary Diagnosis: Sepsis ([facility] [address] [A41.9] ESRD on dialysis ([facility] [address] [N18.6, Z99.2] Precautions: falls ASSESSMENT Pt is 78 y/o female with hx of CAD s/p stent placement x2, CKD, came to [facility] with c/o fever, gradual persistent progressive weakness, feeling unwell and adm on 2/24/2021 for sepsis sec to  MRSA bacteremia, hyperglycemia,  anemia, renal osteodystrophy, COVID negative (2/24/2021). Pt received in semi supine  and agreeable for OT/PT eval.  Pt is A&Ox 4 ,per pt report, pt lives with spouse in tri level home with aprox 5 steps shannon rails to enter with bedroom/bathroom upstairs (pt unable to provide number of steps or information) and stating she is independent for ADLs  and functional transfers/mobility with no AD prior to admission . Information about PLOF may not be accurate . Based on the objective data described below ,pt currently presents with confusion , drowsy , decreased balance, decreased activity tolerance, generalized weakness, decreased safety awareness and increased need for assist with functional transfers/mobility (mod Ax2 rolling, max Ax2 sup<->sit, mod Ax2 scooting EOB, mod Ax2 sit<->stand and able to take side steps - 3' with Rw, modA x 2 . Patient will benefit from skilled therapy intervention to address the above noted impairments. Recommend discharge to SNF when medically appropriate. Current Level of Function Impacting Discharge (mobility/balance): Requires modA x2 for functional mobility . Functional Outcome Measure: The patient scored 11 on the AM Pac basic mobility  outcome measure which is indicative of high complexity . Other factors to consider for discharge: PLOF , time since onset , severity of deficits . PLAN : 
Recommendations and Planned Interventions: bed mobility training, transfer training, gait training, therapeutic exercises, neuromuscular re-education, patient and family training/education, and therapeutic activities Frequency/Duration: Patient will be followed by physical therapy:  5 times a week to address goals. Recommendation for discharge: (in order for the patient to meet his/her long term goals) SNF This discharge recommendation: 
Has been made in collaboration with the attending provider and/or case management IF patient discharges home will need the following DME: wheelchair SUBJECTIVE:  
Patient stated I am tired .  OBJECTIVE DATA SUMMARY:  
HISTORY:   
Past Medical History:  
Diagnosis Date CAD (coronary artery disease) stents x 2 Chronic kidney disease   
 t-th-s/us renal care in [facility] hghts Diabetes ([facility] [address] type 2 Hypercholesterolemia Hypertension Morbid obesity ([facility] [address] bmi 35 Renal insufficiency Past Surgical History:  
Procedure Laterality Date COLONOSCOPY    
 EGD    
 HX APPENDECTOMY  2019 HX GASTRIC BYPASS  2009  
 gastric band HX ORTHOPAEDIC Bilateral   
 ctr HX VASCULAR ACCESS Left 2020  
 lorenzo cath for dialysis [place] CARDIAC SURG PROCEDURE UNLIST 2 cardiac stents [place] LAP, PLACE ADJUST GASTR BAND  7/29/10 Realize-C  
 [place] NEEDLE BIOPSY LIVER,W OTHR PROC  7/29/10 [place] REPAIR EPIGASTRIC HERNIA,REDUC  7/29/10 Personal factors and/or comorbidities impacting plan of care:  
 
Home Situation Home Environment: Private residence # Steps to Enter: 5 Rails to Enter: Yes Hand Rails : Bilateral 
One/Two Story Residence: Other (Comment)(3 story home ) # of Interior Steps: 12 Living Alone: No 
Support Systems: Spouse/Significant Other/Partner PLOF: Pt needed assist  for ADLS/IADLS, IND with mobility prior to admission. EXAMINATION/PRESENTATION/DECISION MAKING:  
Critical Behavior: 
Neurologic State: Alert, Drowsy, Lethargic Orientation Level: Oriented X4 Skin:  intact where exposed Range Of Motion: 
AROM: Generally decreased, functional 
 
 
Strength:   
Strength: Generally decreased, functional(2/5 grossly) Tone & Sensation:  
Tone: Normal 
  
Sensation: Intact Functional Mobility: 
Bed Mobility: 
Rolling: Moderate assistance;Assist x2 Supine to Sit: Maximum assistance;Assist x2 Sit to Supine: Maximum assistance;Assist x2 Scooting: Moderate assistance;Assist x2 Transfers: 
Sit to Stand: Moderate assistance;Assist x2 Stand to Sit: Moderate assistance;Assist x2 Balance:  
Sitting: Impaired Sitting - Static: Fair (occasional) Sitting - Dynamic: Poor (constant support) Standing: Impaired; With support Standing - Static: Fair;Constant support Standing - Dynamic : Poor;Constant support Ambulation/Gait Training: 
Distance (ft): 3 Feet (ft) Assistive Device: Walker, rolling Ambulation - Level of Assistance: Moderate assistance;Assist x1 Functional Measure: 
 
[address] [place] [id number] AM-PAC 6 Clicks Basic Mobility Inpatient Short Form How much difficulty does the patient currently have. .. Unable A Lot A Little None 1. Turning over in bed (including adjusting bedclothes, sheets and blankets)? [] 1   [x] 2   [] 3   [] 4  
2. Sitting down on and standing up from a chair with arms ( e.g., wheelchair, bedside commode, etc.)   [] 1   [x] 2   [] 3   [] 4  
3. Moving from lying on back to sitting on the side of the bed? [] 1   [x] 2   [] 3   [] 4 How much help from another person does the patient currently need. .. Total A Lot A Little None 4. Moving to and from a bed to a chair (including a wheelchair)? [] 1   [x] 2   [] 3   [] 4  
5. Need to walk in hospital room? [] 1   [x] 2   [] 3   [] 4  
6. Climbing 3-5 steps with a railing? [x] 1   [] 2   [] 3   [] 4  
© 2007, Trustees of [address] [place] [address] [address] [address], under license to [employer]. All rights reserved Score:  Initial: 11 Most Recent: X (Date: 3/4/21-- ) Interpretation of Tool:  Represents activities that are increasingly more difficult (i.e. Bed mobility, Transfers, Gait). Score 24 23 22-20 19-15 14-10 9-7 6 Modifier CH CI CJ CK CL CM CN Physical Therapy Evaluation Charge Determination History Examination Presentation Decision-Making MEDIUM  Complexity : 1-2 comorbidities / personal factors will impact the outcome/ POC  MEDIUM Complexity : 3 Standardized tests and measures addressing body structure, function, activity limitation and / or participation in recreation  LOW Complexity : Stable, uncomplicated  Other Functional Measure [facility] 6 high complexity Based on the above components, the patient evaluation is determined to be of the following complexity level: LOW Pain Ratin/10 Activity Tolerance:  
Fair and requires rest breaks Please refer to the flowsheet for vital signs taken during this treatment. After treatment patient left in no apparent distress:  
Supine in bed, Patient positioned in L sidelying for pressure relief, Call bell within reach, and Bed / chair alarm activated COMMUNICATION/EDUCATION:  
The patients plan of care was discussed with: Occupational therapist and Registered nurse. Fall prevention education was provided and the patient/caregiver indicated understanding. and Patient/family agree to work toward stated goals and plan of care. Problem: Mobility Impaired (Adult and Pediatric) Goal: *Acute Goals and Plan of Care (Insert Text) Description: Pt will be I with LE HEP in 7 days. Pt will perform bed mobility with Stepan in 7 days. Pt will perform transfers with Stepan in 7 days. Pt will amb -25 feet with LRAD safely with Stepan in 7 days. Outcome: Not Met Seen with OT for increased pt's safety and maximum functional outcome . Thank you for this referral. 
Bettie Grajeda Time Calculation: 20 mins

## 2021-03-04 NOTE — PROGRESS NOTES
Progress Note Patient: Cynthia Upton MRN: [id number]  SSN: [id number] YOB: 1955  Age: 77 y.o. Sex: female Admit Date: 2/24/2021 LOS: 8 days Subjective:  
Patient followed for sepsis with persistent MRSA bacteremia currently on Vancomycin and Ceftaroline. She is now afebrile with persistent leukocytosis but decreasing procal and CRP. Repeat blood cultlures pending. SAYDA planned for today. Patient is off the floor at this time.  in the room and was given update on her condition. Objective:  
 
Vitals:  
 03/04/21 1005 03/04/21 1035 03/04/21 1105 03/04/21 1135 BP: (!) 158/59 (!) 149/56 (!) 135/54 (!) 154/54 Pulse: 72 72 73 74 Resp: 18 18 18 19 Temp:      
TempSrc:      
SpO2:      
Weight:      
Height:      
  
 
Intake and Output: 
Current Shift: No intake/output data recorded. Last three shifts: No intake/output data recorded. Physical Exam:  
 Constitutional:   
   General: She is not in acute distress. Appearance: She is obese. She is ill-appearing. HENT:  
   Head: Normocephalic and atraumatic. Nose: Nose normal.  
   Mouth/Throat:  
   Pharynx: Oropharynx is clear. Eyes:  
   Pupils: Pupils are equal, round, and reactive to light. Cardiovascular:  
   Rate and Rhythm: Normal rate and regular rhythm. Heart sounds: No murmur. Pulmonary:  
   Breath sounds: No wheezing, rhonchi or rales. Genitourinary: 
   Comments: No Ann Musculoskeletal:  
   Right lower leg: No edema. Left lower leg: No edema. Comments: Left upper arm graft without tenderness or erythema, no drainage Skin: 
   Findings: No rash. Neurological:  
   General: No focal deficit present. Mental Status: She is alert and oriented to person, place, and time. Psychiatric:     
   Mood and Affect: Mood normal.     
   Behavior: Behavior normal.     
   Thought Content:  Thought content normal.     
   Judgment: Judgment normal. 
 Lab/Data Review: WBC 13,900 Procalcitonin 11.66 <17.02 < 41.16 < 128.41 <180.97  
CRP 18.30 <21.70 <15.60 <17.60 <16.60 Blood cultures (2/24) MRSA Blood cultures (2/25) MRSA Blood cultures (2/26) MRSA Blood cultures (2/27) MRSA Blood cultures (3/1) MRSA Blood cultures (3/3) Pending Blood cultures (3/4) Pending 
 
TTE (2/28)  Tricuspid valve with possible echogenic mobile linear densities. SAYDA recommended. Assessment:  
 
Active Problems: 
  ESRD on dialysis ([facility] [address] (2/24/2021) Sepsis ([facility] [address] (2/24/2021) 
 
  
1. Sepsis with fever, leukocytosis and elevated lactic acid, procalcitonin and CRP 2. Persistent MRSA bacteremia, Day #9 IV Vancomycin, Day  #7 Ceftaroline 3. Presumptive infected AV graft left arm, secondary to above 4. Possible vegetation on the tricuspid valve, SAYDA pending 5. ESRD on HD 6. Covid-19 negative Comment:  Patient remain bacteremic despite combination therapy with Vancomycin and Ceftaroline. Endocarditis is a concern. Persistent leukocytosis with decreasing procal and CRP. Plan: 1. Continue IV Vancomycin and Ceftaroline 200 mg IV q12 hours for synergy; Consider switching to Daptomycin if repeat blood cultures positive 2. SAYDA pending 3. In am, repeat CBC, procal and CRP 4. Follow-up pending blood cultures Signed By: eRid Riley MD   
 March 4, 2021

## 2021-03-04 NOTE — PROGRESS NOTES
Problem: Falls - Risk of 
Goal: *Absence of Falls Description: Document Maikol Ocasio Fall Risk and appropriate interventions in the flowsheet. Outcome: Progressing Towards Goal 
Note: Fall Risk Interventions: 
Mobility Interventions: OT consult for ADLs, Patient to call before getting OOB, PT Consult for mobility concerns, PT Consult for assist device competence Mentation Interventions: Bed/chair exit alarm Medication Interventions: Bed/chair exit alarm, Evaluate medications/consider consulting pharmacy, Patient to call before getting OOB Elimination Interventions: Bed/chair exit alarm, Call light in reach, Patient to call for help with toileting needs History of Falls Interventions: Bed/chair exit alarm, Consult care management for discharge planning, Evaluate medications/consider consulting pharmacy Problem: Patient Education: Go to Patient Education Activity Goal: Patient/Family Education Outcome: Progressing Towards Goal 
  
Problem: Risk for Spread of Infection Goal: Prevent transmission of infectious organism to others Description: Prevent the transmission of infectious organisms to other patients, staff members, and visitors. Outcome: Progressing Towards Goal 
  
Problem: Patient Education:  Go to Education Activity Goal: Patient/Family Education Outcome: Progressing Towards Goal 
  
Problem: Diabetes Self-Management Goal: *Disease process and treatment process Description: Define diabetes and identify own type of diabetes; list 3 options for treating diabetes. Outcome: Progressing Towards Goal 
Goal: *Incorporating nutritional management into lifestyle Description: Describe effect of type, amount and timing of food on blood glucose; list 3 methods for planning meals. Outcome: Progressing Towards Goal 
Goal: *Incorporating physical activity into lifestyle Description: State effect of exercise on blood glucose levels.  
Outcome: Progressing Towards Goal 
Goal: *Developing strategies to promote health/change behavior Description: Define the ABC's of diabetes; identify appropriate screenings, schedule and personal plan for screenings. Outcome: Progressing Towards Goal 
Goal: *Using medications safely Description: State effect of diabetes medications on diabetes; name diabetes medication taking, action and side effects. Outcome: Progressing Towards Goal 
Goal: *Monitoring blood glucose, interpreting and using results Description: Identify recommended blood glucose targets  and personal targets. Outcome: Progressing Towards Goal 
Goal: *Prevention, detection, treatment of acute complications Description: List symptoms of hyper- and hypoglycemia; describe how to treat low blood sugar and actions for lowering  high blood glucose level. Outcome: Progressing Towards Goal 
Goal: *Prevention, detection and treatment of chronic complications Description: Define the natural course of diabetes and describe the relationship of blood glucose levels to long term complications of diabetes. Outcome: Progressing Towards Goal 
Goal: *Developing strategies to address psychosocial issues Description: Describe feelings about living with diabetes; identify support needed and support network Outcome: Progressing Towards Goal 
Goal: *Insulin pump training Outcome: Progressing Towards Goal 
Goal: *Sick day guidelines Outcome: Progressing Towards Goal 
Goal: *Patient Specific Goal (EDIT GOAL, INSERT TEXT) Outcome: Progressing Towards Goal 
  
Problem: Patient Education: Go to Patient Education Activity Goal: Patient/Family Education Outcome: Progressing Towards Goal 
  
Problem: Pressure Injury - Risk of 
Goal: *Prevention of pressure injury Description: Document Romaine Scale and appropriate interventions in the flowsheet.  
Outcome: Progressing Towards Goal 
Note: Pressure Injury Interventions: 
Sensory Interventions: Keep linens dry and wrinkle-free, Maintain/enhance activity level, Minimize linen layers, Monitor skin under medical devices Moisture Interventions: Absorbent underpads, Apply protective barrier, creams and emollients, Assess need for specialty bed Activity Interventions: Increase time out of bed, Pressure redistribution bed/mattress(bed type), PT/OT evaluation Mobility Interventions: HOB 30 degrees or less, Pressure redistribution bed/mattress (bed type), PT/OT evaluation Nutrition Interventions: Document food/fluid/supplement intake Friction and Shear Interventions: Lift sheet, Lift team/patient mobility team, Minimize layers Problem: Patient Education: Go to Patient Education Activity Goal: Patient/Family Education Outcome: Progressing Towards Goal

## 2021-03-04 NOTE — PROGRESS NOTES
Hospitalist Progress Note NAME: Qing Woo :  1955 MRN:  [id number] Subjective: Chief Complaint / Reason for Physician Visit Persistent mrsa bacteremia S/p hd earlier today and just returned from SAYDA. Alert, nad, looks tired. T 100.8 
 
blcs 3/1-mrsa, both bottles. Review of Systems: 
Symptom Y/N Comments  Symptom Y/N Comments Fever/Chills Y   Chest Pain N Poor Appetite N   Edema N   
Cough N   Abdominal Pain N Sputum N   Joint Pain N   
SOB/RIGGS N   Pruritis/Rash N   
Nausea/vomit N   Tolerating PT/OT NA Diarrhea N   Tolerating Diet Y Constipation N   Other Objective: VITALS:  
Last 24hrs VS reviewed since prior progress note. Most recent are: 
Patient Vitals for the past 24 hrs: 
 Temp Pulse Resp BP SpO2  
21 1035  72 18 (!) 149/56   
21 1005  72 18 (!) 158/59   
21 0934  74 19 (!) 133/59   
21 0905  75 19 (!) 155/60   
21 0835 99.3 °F (37.4 °C) 72 19 (!) 137/56   
21 0745 99.1 °F (37.3 °C) 77 20 (!) 152/60 94 % 21 0234 99.6 °F (37.6 °C) 77 17 (!) 141/64 92 % 21 2023 97.6 °F (36.4 °C) 68 17 (!) 114/45 93 % 21 1521 100.3 °F (37.9 °C) 69 18 (!) 135/52 96 % No intake or output data in the 24 hours ending 21 1102 PHYSICAL EXAM: 
General: nad EENT:  EOMI. Anicteric sclerae. MMM Resp:  CTA bilaterally, no wheezing or rales. No accessory muscle use CV:  Regular  rhythm,  No edema GI:  Soft, Non distended, Non tender. +Bowel sounds Neurologic:  Alert and oriented X 3, normal speech, Psych:   Good insight. Not anxious nor agitated Skin:  No rashes. No jaundice lue avf. Procedures: see electronic medical records for all procedures/Xrays and details which were not copied into this note but were reviewed prior to creation of Plan. LABS: 
I reviewed today's most current labs and imaging studies.  
Pertinent labs include: 
Recent Results (from the past 12 hour(s)) 
VANCOMYCIN, RANDOM  
 Collection Time: 03/04/21  4:15 AM  
Result Value Ref Range  
 Vancomycin, random 28.2 ug/mL  
METABOLIC PANEL, BASIC  
 Collection Time: 03/04/21  4:15 AM  
Result Value Ref Range  
 Sodium 131 (L) 136 - 145 mmol/L  
 Potassium 3.6 3.5 - 5.1 mmol/L  
 Chloride 96 (L) 97 - 108 mmol/L  
 CO2 22 21 - 32 mmol/L  
 Anion gap 13 5 - 15 mmol/L  
 Glucose 195 (H) 65 - 100 mg/dL  
 BUN 50 (H) 6 - 20 mg/dL  
 Creatinine 6.25 (H) 0.55 - 1.02 mg/dL  
 BUN/Creatinine ratio 8 (L) 12 - 20    
 GFR est AA 8 (L) >60 ml/min/1.73m2  
 GFR est non-AA 7 (L) >60 ml/min/1.73m2  
 Calcium 8.5 8.5 - 10.1 mg/dL  
CBC WITH AUTOMATED DIFF  
 Collection Time: 03/04/21  4:15 AM  
Result Value Ref Range  
 WBC 13.9 (H) 3.6 - 11.0 K/uL  
 RBC 2.46 (L) 3.80 - 5.20 M/uL  
 HGB 7.3 (L) 11.5 - 16.0 g/dL  
 HCT 23.1 (L) 35.0 - 47.0 %  
 MCV 93.9 80.0 - 99.0 FL  
 MCH 29.7 26.0 - 34.0 PG  
 MCHC 31.6 30.0 - 36.5 g/dL  
 RDW 14.6 (H) 11.5 - 14.5 %  
 PLATELET 224 150 - 400 K/uL  
 MPV 11.8 8.9 - 12.9 FL  
 NEUTROPHILS 68 32 - 75 %  
 LYMPHOCYTES 14 12 - 49 %  
 MONOCYTES 12 5 - 13 %  
 EOSINOPHILS 5 0 - 7 %  
 BASOPHILS 0 0 - 1 %  
 IMMATURE GRANULOCYTES 4 (H) 0.0 - 0.5 %  
 ABS. NEUTROPHILS 9.5 (H) 1.8 - 8.0 K/UL  
 ABS. LYMPHOCYTES 2.0 0.8 - 3.5 K/UL  
 ABS. MONOCYTES 1.7 (H) 0.0 - 1.0 K/UL  
 ABS. EOSINOPHILS 0.6 (H) 0.0 - 0.4 K/UL  
 ABS. BASOPHILS 0.1 0.0 - 0.1 K/UL  
 ABS. IMM. GRANS. 0.5 (H) 0.00 - 0.04 K/UL  
 DF AUTOMATED    
PROCALCITONIN  
 Collection Time: 03/04/21  4:15 AM  
Result Value Ref Range  
 Procalcitonin 11.66 (H) 0 ng/mL  
C REACTIVE PROTEIN, QT  
 Collection Time: 03/04/21  4:15 AM  
Result Value Ref Range  
 C-Reactive protein 18.30 (H) 0.00 - 0.60 mg/dL  
GLUCOSE, POC  
 Collection Time: 03/04/21  7:53 AM  
Result Value Ref Range  
 Glucose (POC) 233 (H) 65 - 100 mg/dL  
 Performed by SERAFIN MCFADDEN   
 
 
Reviewed most current lab test results and cultures  YES 
Reviewed most current radiology test  results   YES Review and summation of old records today    NO Reviewed patient's current orders and MAR    YES 
PMH/SH reviewed - no change compared to H&P 
______________________________________ Assessment/Plan: 
 
Sepsis 2/2 mrsa bacteremia Persistent mrsa bacteremia, presumptive infected av graft left arm. Latest blcs 3/1/21 reporting mrsa, 3/3 & 3/4 blcs pending. Continue Vancomycin and ceftaroline for antibiotic coverage Infectious disease following 
DARINEL 3/4/21, follow up results 
  
End-stage renal disease on hemodialysis 
patient has a history of end-stage renal disease on hemodialysis with concerns of clotting of fistula Nephrology following. 
  
Hyperglycemia due to type 2 diabetesA1c 8. 6. 
continue ssi/hypoglycemia protocol. Lantus 6 units currently, titrate to 10 units. (pta is on 14 units) Constipation Bowel regimen Continue to monitor for bowel movements 
  
Hypertension 
continue home antihypertensive medications, Amlodipine/benazepril, hydralazine, atenolol. 
  
Hyperlipidemia 
continue statin 
  
ProphylaxisHeparin subcu FENrenal diet, potassium and magnesium to be repleted with dialysis Full code, Disposition- Pending clinical improvement, persistent mrsa bactermia, follow pending cultures, follow darinel results and evolving blood cultures. Cardio, nephrology and ID appreciated. Care Plan discussed with: 
  Comments Patient X Family  X Hsband at bedside RN X   
Care Manager x Consultant  X   
                 X Multidiciplinary team rounds were held today with , nursing, pharmacist and clinical coordinator. Patient's plan of care was discussed; medications were reviewed and discharge planning was addressed. ________________________________________________________________________ Total NON critical care TIME:  30   Minutes Comments >50% of visit spent in counseling and coordination of care X ________________________________________________________________________ Denyce Christopher, MD

## 2021-03-05 NOTE — ROUTINE PROCESS
Bedside and Verbal shift change report given to Salvador Matute (oncoming nurse) by Hong Hall (offgoing nurse). Report included the following information SBAR and MAR.

## 2021-03-05 NOTE — PROGRESS NOTES
Hospitalist Progress Note NAME: Roena Ormond :  1955 MRN:  [id number] Subjective: Chief Complaint / Reason for Physician Visit Persistent mrsa bacteremia No vegies on darinel Alert, nad, looks tired. Low grade fever persists Persistent mrsa bacteremia suspected 2/2 infected avg. Review of Systems: 
Symptom Y/N Comments  Symptom Y/N Comments Fever/Chills Y   Chest Pain N Poor Appetite N   Edema N   
Cough N   Abdominal Pain N Sputum N   Joint Pain N   
SOB/RIGGS N   Pruritis/Rash N   
Nausea/vomit N   Tolerating PT/OT NA Diarrhea N   Tolerating Diet Y Constipation N   Other Objective: VITALS:  
Last 24hrs VS reviewed since prior progress note. Most recent are: 
Patient Vitals for the past 24 hrs: 
 Temp Pulse Resp BP SpO2  
21 1500 98.1 °F (36.7 °C) 72 18 137/66 95 % 21 1106 97.6 °F (36.4 °C) 75 20 (!) 165/70 98 % 21 0809 97.6 °F (36.4 °C) 76 18 125/60 95 % 21 2235  76  (!) 133/52   
21 2104 98.8 °F (37.1 °C) 74 18 (!) 116/52 96 % 21 1626 99.1 °F (37.3 °C) 80 18 (!) 129/51 97 % No intake or output data in the 24 hours ending 21 1620 PHYSICAL EXAM: 
General: nad EENT:  EOMI. Anicteric sclerae. MMM Resp:  CTA bilaterally, no wheezing or rales. No accessory muscle use CV:  Regular  rhythm,  No edema GI:  Soft, Non distended, Non tender. +Bowel sounds Neurologic:  Alert and oriented X 3, normal speech, Psych:   Good insight. Not anxious nor agitated Skin:  No rashes. No jaundice lue avf. Procedures: see electronic medical records for all procedures/Xrays and details which were not copied into this note but were reviewed prior to creation of Plan. LABS: 
I reviewed today's most current labs and imaging studies. Pertinent labs include: 
Recent Results (from the past 12 hour(s)) CBC WITH AUTOMATED DIFF Collection Time: 21  6:39 AM  
Result Value Ref Range WBC 11.8 (H) 3.6 - 11.0 K/uL  
 RBC 2.37 (L) 3.80 - 5.20 M/uL HGB 7.0 (L) 11.5 - 16.0 g/dL HCT 22.4 (L) 35.0 - 47.0 % MCV 94.5 80.0 - 99.0 FL  
 MCH 29.5 26.0 - 34.0 PG  
 MCHC 31.3 30.0 - 36.5 g/dL  
 RDW 14.8 (H) 11.5 - 14.5 % PLATELET 253 364 - 738 K/uL MPV 12.0 8.9 - 12.9 FL  
 NEUTROPHILS 69 32 - 75 % LYMPHOCYTES 14 12 - 49 % MONOCYTES 12 5 - 13 % EOSINOPHILS 6 0 - 7 % BASOPHILS 0 0 - 1 % IMMATURE GRANULOCYTES 3 (H) 0.0 - 0.5 % ABS. NEUTROPHILS 8.1 (H) 1.8 - 8.0 K/UL  
 ABS. LYMPHOCYTES 1.7 0.8 - 3.5 K/UL  
 ABS. MONOCYTES 1.4 (H) 0.0 - 1.0 K/UL  
 ABS. EOSINOPHILS 0.7 (H) 0.0 - 0.4 K/UL  
 ABS. BASOPHILS 0.0 0.0 - 0.1 K/UL  
 ABS. IMM. GRANS. 0.4 (H) 0.00 - 0.04 K/UL  
 DF AUTOMATED    
C REACTIVE PROTEIN, QT Collection Time: 03/05/21  6:39 AM  
Result Value Ref Range C-Reactive protein 14.80 (H) 0.00 - 0.60 mg/dL PROCALCITONIN Collection Time: 03/05/21  6:39 AM  
Result Value Ref Range Procalcitonin 6.32 (H) 0 ng/mL GLUCOSE, POC Collection Time: 03/05/21  8:49 AM  
Result Value Ref Range Glucose (POC) 293 (H) 65 - 100 mg/dL Performed by Aniya Nickerson, POC Collection Time: 03/05/21 10:56 AM  
Result Value Ref Range Glucose (POC) 243 (H) 65 - 100 mg/dL Performed by [facility] Reviewed most current lab test results and cultures  YES Reviewed most current radiology test results   YES Review and summation of old records today    NO Reviewed patient's current orders and MAR    YES 
PM/ reviewed - no change compared to H&P 
______________________________________ Assessment/Plan: 
 
Sepsis 2/2 mrsa bacteremia Persistent mrsa bacteremia, presumptive infected av graft left arm. Latest blcs 3/1/21 reporting mrsa, 3/3 & 3/4 blcs pending. Continue Vancomycin and ceftaroline for antibiotic coverage Infectious disease following 
SAYDA 3/4/21, no veggies 
  
End-stage renal disease on hemodialysis patient has a history of end-stage renal disease on hemodialysis with concerns of clotting of fistula Nephrology following. 
  
Hyperglycemia due to type 2 diabetesA1c 8. 6. 
continue ssi/hypoglycemia protocol. Lantus 6 units currently, titrate to 10 units. (pta is on 14 units) Constipation Bowel regimen Continue to monitor for bowel movements 
  
Hypertension 
continue home antihypertensive medications, Amlodipine/benazepril, hydralazine, atenolol. 
  
Hyperlipidemia 
continue statin 
  
ProphylaxisHeparin subcu FENrenal diet, potassium and magnesium to be repleted with dialysis Full code, Disposition- Pending clinical improvement, persistent mrsa bactermia, follow pending cultures, follow darinel results and evolving blood cultures. Cardio, nephrology and ID appreciated. Care Plan discussed with: 
  Comments Patient X Family  X Hsband at bedside RN X   
Care Manager x Consultant  X   
                 X Multidiciplinary team rounds were held today with , nursing, pharmacist and clinical coordinator. Patient's plan of care was discussed; medications were reviewed and discharge planning was addressed. ________________________________________________________________________ Total NON critical care TIME:  30   Minutes Comments >50% of visit spent in counseling and coordination of care X   
________________________________________________________________________ Joan Preston MD

## 2021-03-05 NOTE — PROGRESS NOTES
1400 Return to unit. Patient drowsy, Temp 100.8.  at bedside. Reassured patient can eat at 15:15. States  She cant eat crackers. reassured will call dietary. Same done.

## 2021-03-05 NOTE — PROGRESS NOTES
CHOLO notes patient was accepted by [facility] for [facility] with an anticipated [facility] of 2/27/21. CM will continue to follow for any additional discharge planning needs CM notes patient has SNF recs, but she declined and wants HH. Current with [facility] in [address] [place] [address] for dialysis

## 2021-03-06 NOTE — PROGRESS NOTES
Progress Note Patient: Florentin Montemayor MRN: [id number]  SSN: [id number] YOB: 1955  Age: 77 y.o. Sex: female Admit Date: 2/24/2021 LOS: 9 days Subjective:  
Patient followed for sepsis with persistent MRSA bacteremia currently on Vancomycin and Ceftaroline. She is now afebrile with persistent leukocytosis but decreasing procal and CRP. Repeat blood cultlures pending. SAYDA was negative for vegetations. Patient resting comfortably and subjectively improved. Objective:  
 
Vitals:  
 03/05/21 0809 03/05/21 1106 03/05/21 1500 03/05/21 1720 BP: 125/60 (!) 165/70 137/66 (!) 103/45 Pulse: 76 75 72 69 Resp: 18 20 18 Temp: 97.6 °F (36.4 °C) 97.6 °F (36.4 °C) 98.1 °F (36.7 °C) TempSrc:      
SpO2: 95% 98% 95% Weight:      
Height:      
  
 
Intake and Output: 
Current Shift: No intake/output data recorded. Last three shifts: 03/04 0701 - 03/05 1900 In: 100 [I.V.:100] Out: 2000 Physical Exam:  
 Constitutional:   
   General: She is not in acute distress. Appearance: She is obese. She is ill-appearing. HENT:  
   Head: Normocephalic and atraumatic. Nose: Nose normal.  
   Mouth/Throat:  
   Pharynx: Oropharynx is clear. Eyes:  
   Pupils: Pupils are equal, round, and reactive to light. Cardiovascular:  
   Rate and Rhythm: Normal rate and regular rhythm. Heart sounds: No murmur. Pulmonary:  
   Breath sounds: No wheezing, rhonchi or rales. Genitourinary: 
   Comments: No Ann Musculoskeletal:  
   Right lower leg: No edema. Left lower leg: No edema. Comments: Left upper arm graft without tenderness or erythema, no drainage Skin: 
   Findings: No rash. Neurological:  
   General: No focal deficit present. Mental Status: She is alert and oriented to person, place, and time.   
Psychiatric:     
   Mood and Affect: Mood normal.     
   Behavior: Behavior normal.     
 Thought Content: Thought content normal.     
   Judgment: Judgment normal. 
 
Lab/Data Review: WBC 11,800 Procalcitonin 6.32 <11.66 <17.02 < 41.16 < 128.41 <180.97  
CRP 14.80 <18.30 <21.70 <15.60 <17.60 <16.60 Blood cultures (2/24) MRSA Blood cultures (2/25) MRSA Blood cultures (2/26) MRSA Blood cultures (2/27) MRSA Blood cultures (3/1) MRSA Blood cultures (3/3) No growth 1 day0 Blood cultures (3/4) Possible GPC in clusters TTE (2/28)  Tricuspid valve with possible echogenic mobile linear densities. SAYDA (3/4) No echocardiographic evidence of valvular vegetation or endocarditis appreciated. Assessment:  
 
Active Problems: 
  ESRD on dialysis ([facility] [address] (2/24/2021) Sepsis ([facility] [address] (2/24/2021) 
 
  
1. Sepsis with fever, leukocytosis and elevated lactic acid, procalcitonin and CRP 2. Persistent MRSA bacteremia, Day #10 IV Vancomycin, Day  #8 Ceftaroline 3. Presumptive infected AV graft left arm, secondary to above 4. Possible vegetation on the tricuspid valve, SAYDA pending 5. ESRD on HD 6. Covid-19 negative Comment:  SAYDA negative for endocarditis but last blood still with GPC in clusters, though may not be MRSA. WBC, procal and CRP decreasing. Plan: 1. Continue IV Vancomycin and Ceftaroline 200 mg IV q12 hours for synergy; 2. Switching to Daptomycin if GPC in last blood cultures still identified as MRSA. 3. In am, repeat blood cultures 4. On Monday, repeat CBC, procal and CRP 4. Follow-up pending blood cultures Signed By: Lenore Cheema MD   
 March 5, 2021

## 2021-03-06 NOTE — ROUTINE PROCESS
Paged on call MD Brian for abnormal labs K 5.2 and hgb 6.9, verbal telephone orders received for type and screen and transfuse 2 units prbcs,  change bath to 1K/ 2.5 CA for one hour and 2 K /2.5 CA for next two hours

## 2021-03-06 NOTE — ROUTINE PROCESS
Bedside and Verbal shift change report given to Wang Birmingham (oncoming nurse) by Yordan Mast (offgoing nurse). Report included the following information SBAR and MAR.

## 2021-03-06 NOTE — PROGRESS NOTES
Hospitalist Progress Note NAME: Trae Gong :  1955 MRN:  [id number] Subjective: Chief Complaint / Reason for Physician Visit Persistent mrsa bacteremia No vegies on darinel Alert, nad,lucid, daughter at bedside. Low grade fever persists 100.3 Received 2 units prbc with hd today Persistent mrsa bacteremia suspected 2/2 infected avg. 
3/3 blcs ng 
3/4 mrsa x2 Review of Systems: 
Symptom Y/N Comments  Symptom Y/N Comments Fever/Chills Y   Chest Pain N Poor Appetite N   Edema N   
Cough N   Abdominal Pain N Sputum N   Joint Pain N   
SOB/RIGGS N   Pruritis/Rash N   
Nausea/vomit N   Tolerating PT/OT NA Diarrhea N   Tolerating Diet Y Constipation N   Other Objective: VITALS:  
Last 24hrs VS reviewed since prior progress note. Most recent are: 
Patient Vitals for the past 24 hrs: 
 Temp Pulse Resp BP SpO2  
21 1554 100.3 °F (37.9 °C) 79 16 (!) 94/47   
21 1235  74  (!) 116/52   
21 1200  74  (!) 116/52   
21 1115 99.2 °F (37.3 °C) 75  (!) 114/50   
21 1100  76  (!) 114/47   
21 1030  74  (!) 116/52   
21 1000  74  (!) 114/52   
21 0930  73  (!) 130/58   
21 0900  73  130/61   
21 0830  73  (!) 131/59   
21 0800  72  (!) 116/47   
21 0747 99 °F (37.2 °C) 71  127/61   
21 2302 98.4 °F (36.9 °C) 73 18 (!) 121/50 95 % 21 1720  69  (!) 103/45  Intake/Output Summary (Last 24 hours) at 3/6/2021 1703 Last data filed at 3/6/2021 1115 Gross per 24 hour Intake  Output 2000 ml Net -2000 ml PHYSICAL EXAM: 
General: nad EENT:  EOMI. Anicteric sclerae. MMM Resp:  CTA bilaterally, no wheezing or rales. No accessory muscle use CV:  Regular  rhythm,  No edema GI:  Soft, Non distended, Non tender. +Bowel sounds Neurologic:  Alert and oriented X 3, normal speech, Psych:   Good insight. Not anxious nor agitated Skin:  No rashes.   No jaundice lue avf. Procedures: see electronic medical records for all procedures/Xrays and details which were not copied into this note but were reviewed prior to creation of Plan. LABS: 
I reviewed today's most current labs and imaging studies. Pertinent labs include: 
Recent Results (from the past 12 hour(s)) METABOLIC PANEL, BASIC Collection Time: 03/06/21  6:42 AM  
Result Value Ref Range Sodium 130 (L) 136 - 145 mmol/L Potassium 5.2 (H) 3.5 - 5.1 mmol/L Chloride 98 97 - 108 mmol/L  
 CO2 26 21 - 32 mmol/L Anion gap 6 5 - 15 mmol/L Glucose 177 (H) 65 - 100 mg/dL BUN 40 (H) 6 - 20 mg/dL Creatinine 6.06 (H) 0.55 - 1.02 mg/dL BUN/Creatinine ratio 7 (L) 12 - 20 GFR est AA 8 (L) >60 ml/min/1.73m2 GFR est non-AA 7 (L) >60 ml/min/1.73m2 Calcium 8.9 8.5 - 10.1 mg/dL CBC WITH AUTOMATED DIFF Collection Time: 03/06/21  6:42 AM  
Result Value Ref Range WBC 12.3 (H) 3.6 - 11.0 K/uL  
 RBC 2.34 (L) 3.80 - 5.20 M/uL HGB 6.9 (L) 11.5 - 16.0 g/dL HCT 22.2 (L) 35.0 - 47.0 % MCV 94.9 80.0 - 99.0 FL  
 MCH 29.5 26.0 - 34.0 PG  
 MCHC 31.1 30.0 - 36.5 g/dL  
 RDW 14.7 (H) 11.5 - 14.5 % PLATELET 697 928 - 313 K/uL MPV 11.7 8.9 - 12.9 FL  
 NEUTROPHILS 68 32 - 75 % LYMPHOCYTES 14 12 - 49 % MONOCYTES 8 5 - 13 % EOSINOPHILS 7 0 - 7 % BASOPHILS 1 0 - 1 % IMMATURE GRANULOCYTES 2 (H) 0.0 - 0.5 % ABS. NEUTROPHILS 8.6 (H) 1.8 - 8.0 K/UL  
 ABS. LYMPHOCYTES 1.8 0.8 - 3.5 K/UL  
 ABS. MONOCYTES 1.0 0.0 - 1.0 K/UL  
 ABS. EOSINOPHILS 0.8 (H) 0.0 - 0.4 K/UL  
 ABS. BASOPHILS 0.1 0.0 - 0.1 K/UL  
 ABS. IMM. GRANS. 0.3 (H) 0.00 - 0.04 K/UL  
 DF AUTOMATED    
TYPE & SCREEN Collection Time: 03/06/21 10:30 AM  
Result Value Ref Range Crossmatch Expiration 03/09/2021,2359 ABO/Rh(D) O Positive Antibody screen Negative Unit number [id number] Blood component type [facility] Unit division 00 Status of unit Allocated  TRANSFUSION STATUS Ok to transfuse Crossmatch result Compatible Unit number [id number] Blood component type [facility] Unit division 00 Status of unit Allocated TRANSFUSION STATUS Ok to transfuse Crossmatch result Compatible GLUCOSE, POC Collection Time: 03/06/21 12:17 PM  
Result Value Ref Range Glucose (POC) 140 (H) 65 - 100 mg/dL Performed by Aston Verdin Reviewed most current lab test results and cultures  YES Reviewed most current radiology test results   YES Review and summation of old records today    NO Reviewed patient's current orders and MAR    YES 
PMH/ reviewed - no change compared to H&P 
______________________________________ Assessment/Plan: 
 
Sepsis 2/2 mrsa bacteremia Persistent mrsa bacteremia, presumptive infected av graft left arm. Latest blcs 3/1/21 reporting mrsa, 3/3  Ng, 3/4 mrsa x 2. Continue Vancomycin and ceftaroline for antibiotic coverage, abx per ID. Infectious disease following 
DARINEL 3/4/21, no veggies 
  
End-stage renal disease on hemodialysis 
patient has a history of end-stage renal disease on hemodialysis with concerns of clotting of fistula Nephrology following. 
  
Hyperglycemia due to type 2 diabetesA1c 8. 6. 
continue ssi/hypoglycemia protocol. Lantus 6 units currently, titrate to 10 units. (pta is on 14 units) Constipation Bowel regimen Continue to monitor for bowel movements 
  
Hypertension 
continue home antihypertensive medications, Amlodipine/benazepril, hydralazine, atenolol. 
  
Hyperlipidemia 
continue statin AOCD: 
Received prbc x 2 with hd 3/6. 
  
ProphylaxisHeparin subcu FENrenal diet, potassium and magnesium to be repleted with dialysis Full code, Disposition- Pending clinical improvement, persistent mrsa bactermia, follow pending cultures, follow darinel results and evolving blood cultures. Cardio, nephrology and ID appreciated. Care Plan discussed with: 
  Comments Patient X Family  X Hsband at bedside RN X   
Care Manager x Consultant  X   
                 X Multidiciplinary team rounds were held today with , nursing, pharmacist and clinical coordinator. Patient's plan of care was discussed; medications were reviewed and discharge planning was addressed. ________________________________________________________________________ Total NON critical care TIME:  30   Minutes Comments >50% of visit spent in counseling and coordination of care X   
________________________________________________________________________ Domingo Burgess MD

## 2021-03-06 NOTE — PROGRESS NOTES
Renal progress Note NAME:  Hector Barrett :   1955 MRN:   [id number] Date/Time:  3/6/2021 1:08 PM 
 
 
Subjective:  
Patient seen in the dialysis unit. She denies any complaints today. Rating procedure well. However hemoglobin today 6.9. Potassium 5.2 REVIEW OF SYSTEMS:    
 
Constitutional: Negative for chills and fever. HENT: Negative. Eyes: Negative. Respiratory: Negative. Cardiovascular: Negative. Gastrointestinal: Negative for abdominal pain and nausea. Skin: Negative. Neurological: Negative. Objective: VITALS:   
Visit Vitals BP (!) 116/52 Pulse 74 Temp 99.2 °F (37.3 °C) (Oral) Resp 18 Ht 5' 3\" (1.6 m) Wt 87.7 kg (193 lb 5.5 oz) SpO2 95% BMI 34.25 kg/m² Temp (24hrs), Av.7 °F (37.1 °C), Min:98.1 °F (36.7 °C), Max:99.2 °F (37.3 °C) PHYSICAL EXAM:  
General:    Alert, cooperative, no distress, appears stated age. HEENT: Atraumatic, anicteric sclerae, pink conjunctivae No oral ulcers, mucosa moist, throat clear Neck:  Supple, symmetrical,  thyroid: non tender Lungs:   Clear to auscultation bilaterally. No Wheezing or Rhonchi. No rales. Chest wall:  No tenderness  No Accessory muscle use. Heart:   Regular  rhythm,  No  murmur   No edema Abdomen:   Soft, non-tender. Not distended. Bowel sounds normal 
Extremities: No cyanosis. No clubbing. Left arm AV graft with no visible erythema over the skin. However a scab seen from the bleeding site Skin:     Not pale. Not Jaundiced  No rashes Psych:  Good insight. Not depressed. Not anxious or agitated. Neurologic: EOMs intact. No facial asymmetry. No aphasia or slurred speech. Symmetrical strength, Alert and oriented X 4. LAB DATA REVIEWED:   
Recent Results (from the past 24 hour(s)) GLUCOSE, POC Collection Time: 21  5:06 PM  
Result Value Ref Range Glucose (POC) 130 (H) 65 - 100 mg/dL  Performed by Radha Dennison POC  
 Collection Time: 03/05/21 11:05 PM  
Result Value Ref Range Glucose (POC) 204 (H) 65 - 100 mg/dL Performed by [id number] Collection Time: 03/06/21  6:42 AM  
Result Value Ref Range Sodium 130 (L) 136 - 145 mmol/L Potassium 5.2 (H) 3.5 - 5.1 mmol/L Chloride 98 97 - 108 mmol/L  
 CO2 26 21 - 32 mmol/L Anion gap 6 5 - 15 mmol/L Glucose 177 (H) 65 - 100 mg/dL BUN 40 (H) 6 - 20 mg/dL Creatinine 6.06 (H) 0.55 - 1.02 mg/dL BUN/Creatinine ratio 7 (L) 12 - 20 GFR est AA 8 (L) >60 ml/min/1.73m2 GFR est non-AA 7 (L) >60 ml/min/1.73m2 Calcium 8.9 8.5 - 10.1 mg/dL CBC WITH AUTOMATED DIFF Collection Time: 03/06/21  6:42 AM  
Result Value Ref Range WBC 12.3 (H) 3.6 - 11.0 K/uL  
 RBC 2.34 (L) 3.80 - 5.20 M/uL HGB 6.9 (L) 11.5 - 16.0 g/dL HCT 22.2 (L) 35.0 - 47.0 % MCV 94.9 80.0 - 99.0 FL  
 MCH 29.5 26.0 - 34.0 PG  
 MCHC 31.1 30.0 - 36.5 g/dL  
 RDW 14.7 (H) 11.5 - 14.5 % PLATELET 211 142 - 496 K/uL MPV 11.7 8.9 - 12.9 FL  
 NEUTROPHILS 68 32 - 75 % LYMPHOCYTES 14 12 - 49 % MONOCYTES 8 5 - 13 % EOSINOPHILS 7 0 - 7 % BASOPHILS 1 0 - 1 % IMMATURE GRANULOCYTES 2 (H) 0.0 - 0.5 % ABS. NEUTROPHILS 8.6 (H) 1.8 - 8.0 K/UL  
 ABS. LYMPHOCYTES 1.8 0.8 - 3.5 K/UL  
 ABS. MONOCYTES 1.0 0.0 - 1.0 K/UL  
 ABS. EOSINOPHILS 0.8 (H) 0.0 - 0.4 K/UL  
 ABS. BASOPHILS 0.1 0.0 - 0.1 K/UL  
 ABS. IMM. GRANS. 0.3 (H) 0.00 - 0.04 K/UL  
 DF AUTOMATED    
TYPE & SCREEN Collection Time: 03/06/21 10:30 AM  
Result Value Ref Range Crossmatch Expiration 03/09/2021,2359 ABO/Rh(D) O Positive Antibody screen Negative Unit number [id number] Blood component type [facility] Unit division 00 Status of unit Allocated TRANSFUSION STATUS Ok to transfuse Crossmatch result Compatible Unit number [id number] Blood component type [facility] Unit division 00 Status of unit Allocated TRANSFUSION STATUS Ok to transfuse Crossmatch result Compatible GLUCOSE, POC Collection Time: 03/06/21 12:17 PM  
Result Value Ref Range Glucose (POC) 140 (H) 65 - 100 mg/dL Performed by Santi Roca Recommendations/Plan:  
#1 ESRD 
-She goes for dialysis at [facility] every TTS 
-She is currently being dialyzed with 2K bath which she is tolerating well-Left arm AV graft is current access 
-No uremic or volume overload symptoms 
-Next dialysis will be on on Tuesday per schedule  
-Continue TTS schedule in the hospital 
 
#2 renal osteodystrophy 
-Not noted to be on any phosphorus binders. We will check phosphorus with next labs. Obtain PTH from outpatient and continue Zemplar if indicated 
-Calcium is okay #3 anemia acute on chronic 
-hemoglobin is 6.9. 
-I will transfuse her 2 units of blood today  
-Continue Procrit with dialysis #4 sepsis 
-Presented with fever over 103, leukocytosis and elevated lactic acid 
-Positive blood cultures with gram-positive cocci in clusters consistent with staph 
-Possible source is infected left arm AV graft 
-ID has been consulted. Continue IV vancomycin and ceftaroline 
-TTE has been ordered to rule out vegetations 
-Now afebrile. chills have improved after adding ceftaroline for synergy 
-Will probably need long-term antibiotics at least for 6 weeks. can be given on dialysis #5 hypertension 
-Blood pressure is well controlled with amlodipine and hydralazine which I will continue #6 hyponatremia 
-Sodium today has improved 123->130.   
-Will plan dialysis with high sodium bath. -Restrict fluid intake #7 Hypokalemia: 
-K 5.2. Continue dialysis with 2K bath 
 
 
________________________________________________________________________ Signed: Terra Godinez MD

## 2021-03-06 NOTE — PROGRESS NOTES
Bedside and Verbal shift change report given to Joe Kern RN (oncoming nurse) by Salvador Matute RN (offgoing nurse). Report included the following information SBAR and MAR.

## 2021-03-07 NOTE — ROUTINE PROCESS
Bedside shift change report given to Tommy Torre RN (oncoming nurse) by Destinee Condon RN (offgoing nurse). Report included the following information SBAR, Kardex, and MAR.

## 2021-03-07 NOTE — PROGRESS NOTES
Infectious Disease Progress Note Subjective:  
Asked to see pt due to acute change in clinical status. She is being followed by Dr Tricia Anderson for MRSA bacteremia refractory to treatment. She reported feeling unwell today, and CXR showed diffuse left infiltrate. He denies productive cough and is maintaining O2 sats on 4 L. She was last dialyzed on 3/06. Pt spiked a temp of 100.6 on 3/06 but has been afebrile today. GPC in clusters were isolated from BCx on 3/06. Pt is anuric, received 160 mg of lasix today w no UO. She reported feeling fine during my assessment. Objective:  
Physical Exam:  
 
Visit Vitals BP (!) 150/90 Pulse 78 Temp 97.4 °F (36.3 °C) Resp 20 Ht 5' 3\" (1.6 m) Wt 192 lb 10.9 oz (87.4 kg) SpO2 96% BMI 34.13 kg/m² O2 Flow Rate (L/min): 4 l/min O2 Device: Nasal cannula Temp (24hrs), Av.9 °F (36.6 °C), Min:96.8 °F (36 °C), Max:100.6 °F (38.1 °C) No intake/output data recorded.  1901 -  0700 In: -  
Out:  General: NAD, alert, OOB to chair, AAO x 4 HEENT: CARLEY, Moist mucosa Lungs: Decreased breath sounds in left lung field, no wheeze/rhonchi, mild basilar crackles Heart: S1S2+, RRR, no murmur Abdo: Soft, NT, ND, +BS  
: No indwelling trivedi Exts: Left arm AV graft Skin: No obvious chronic wounds/ulcers Data Review:  
   
Recent Days: 
Recent Labs 21 
1559 21 
6088 21 
1445 WBC 21.1* 12.3* 11.8* HGB 10.9* 6.9* 7.0*  
HCT 33.3* 22.2* 22.4*  
 227 230 Recent Labs 21 
1559 21 
9086 BUN 31* 40* CREA 4.94* 6.06* Lab Results Component Value Date/Time C-Reactive protein 14.80 (H) 2021 06:39 AM  
  
Microbiology - Blood Cx : GPC in clusters - Blood Cx -: MRSA Diagnostics CXR Results  (Last 48 hours) 21 1012  XR CHEST PORT Final result  Impression:  New findings of confluent airspace consolidation involving most of  
the left hemithorax, relatively sparing the left lung base, radiographic  
appearance suspicious for pneumonia, but there does appear to be background  
bilateral interstitial pulmonary edema with nodular groundglass opacity in the  
right upper hemithorax possibly representing asymmetric edema (versus  
pneumonia). Calcified aortic knob. Prominently calcified mitral annulus. Stable  
enlargement of cardiopericardial silhouette. Sequelae of laparoscopic gastric  
banding. Narrative:  Portable chest, AP upright, 1001 hours, compared with 2/24/2021. CT chest 03/07: Left upper lobe pneumonia. Small left pleural effusion. Bilateral groundglass airspace opacities, edema versus pneumonia. Atherosclerosis. Heavily calcified mitral annulus. Assessment/Plan 1. Acute hypoxia w BOBBI filtrate on CT chest (03/07), suspected aspiration PNA, tested neg for COVID-19 
    2 episodes of emesis today w/o large vomitus per RN Denies productive cough, maintaining O2 sats on 4 L. Afebrile, WBC up to 21.1K Will leave on Ceftaroline for GNR and MRSA coverage Add Metronidazole for anaerobic coverage, can be stopped if not deemed necessary Routine labs and CXR in AM  
     
2. MRSA bacteremia, refractory to med mgt, suspected tricuspid valve veg on 2 D Echo, SAYDA neg for veg (03/04) Similar presentation a year ago, left arm AV graft had to be excised to control bacteremia, replaced in 09/2020 per pt Left arm AV graft will likely have to be removed before BCx can be sterilized Continue on Ceftaroline, d/c Vanc, start on Daptomcyin at 8 mg/kg q 48 hours Lipitor discontinued due to interaction w Daptomycin, baseline CPK ordered. Repeat Bcx in AM  
 
3. ESRD on HD, + left arm AV graft, left arm swelling w/o associated erythema or tenderness 4. Anemia of chronic disease: Stable Hgb after PRBC transfusion Case discussed in details w attending  Nano Hammond MD 
 
3/7/2021

## 2021-03-07 NOTE — PROGRESS NOTES
Hospitalist Progress Note NAME: Dalia Lux :  1955 MRN:  [id number] Subjective:  
 
Seen at bedside: 
Persistent mrsa bacteremia- latest blcs 3/4=mrsa x 2. Repeat 3/6 is pending. Alert, nad,lucid,  at bedside Low grade fever persists 100.3 recceived 1 unit prbc overnight-was febrile 100.6 prior to onset. Stopped 2nd unit this am as she states \"just don't feel well\" but can only specify that she is nauseous. No sob No abd pain No chest pain No vegies on darinel 3/5/21 Persistent mrsa bacteremia suspected 2/2 infected avg. 
3/3 blcs ng 
3/4 mrsa x2 
3/6 pending Review of Systems: 
Symptom Y/N Comments  Symptom Y/N Comments Fever/Chills Y   Chest Pain N Poor Appetite N   Edema N   
Cough N   Abdominal Pain N Sputum N   Joint Pain N   
SOB/RIGGS N   Pruritis/Rash N   
Nausea/vomit N   Tolerating PT/OT NA Diarrhea N   Tolerating Diet Y Constipation N   Other Objective: VITALS:  
Last 24hrs VS reviewed since prior progress note. Most recent are: 
Patient Vitals for the past 24 hrs: 
 Temp Pulse Resp BP SpO2  
21 1255 98.4 °F (36.9 °C)  20 109/68 97 % 21 1115 97.3 °F (36.3 °C) 78 20 114/64 98 % 21 1004 98.5 °F (36.9 °C) 74 16 (!) 109/51 95 % 21 0935 98.3 °F (36.8 °C) 78 16 (!) 112/58 97 % 21 0910 98.4 °F (36.9 °C) 80 18 122/60 94 % 21 0830 97.4 °F (36.3 °C) 78 20 (!) 125/59   
21 0815 97.3 °F (36.3 °C) 73 20 (!) 124/58   
21 0749 97.4 °F (36.3 °C) 80 20 (!) 123/58 92 % 21 0747 98.7 °F (37.1 °C) 73 20 126/60 95 % 21 0545 97.1 °F (36.2 °C) 67 20 122/61 90 % 21 0444 96.8 °F (36 °C) 70 20 108/60 97 % 21 0415 97.6 °F (36.4 °C) 68 20 (!) 123/55 96 % 21 0359 96.8 °F (36 °C) 69 18 121/84 93 % 21 0344 96.8 °F (36 °C) 71 20 (!) 122/54 97 % 21 0012  83  (!) 141/68   
21 2350 (!) 100.6 °F (38.1 °C)      
21     93 % 03/06/21 2003 99.4 °F (37.4 °C) 85 18 (!) 72/42 (!) 88 % 03/06/21 1711  79  (!) 94/47   
03/06/21 1554 100.3 °F (37.9 °C) 79 16 (!) 94/47  No intake or output data in the 24 hours ending 03/07/21 1258 PHYSICAL EXAM: 
General: Looks sluggish, not distressed. EENT:  EOMI. Anicteric sclerae. MMM Resp:  Rales left lower lung fieldd, not labored, sa02 95%- ra. 
CV:  Regular  rhythm,  No edema GI:  Soft, Non distended, Non tender. +Bowel sounds Neurologic:  Alert and oriented X 3, normal speech, hard of hearing. Psych:   Good insight. Not anxious nor agitated Skin:  No rashes. No jaundice lue avf. Procedures: see electronic medical records for all procedures/Xrays and details which were not copied into this note but were reviewed prior to creation of Plan. LABS: 
I reviewed today's most current labs and imaging studies. Pertinent labs include: 
Recent Results (from the past 12 hour(s)) GLUCOSE, POC Collection Time: 03/07/21  8:06 AM  
Result Value Ref Range Glucose (POC) 231 (H) 65 - 100 mg/dL Performed by Kunal Funes GLUCOSE, POC Collection Time: 03/07/21 11:07 AM  
Result Value Ref Range Glucose (POC) 297 (H) 65 - 100 mg/dL Performed by Kunal Funes Reviewed most current lab test results and cultures  YES Reviewed most current radiology test results   YES Review and summation of old records today    NO Reviewed patient's current orders and MAR    YES 
PMH/SH reviewed - no change compared to H&P 
______________________________________ Assessment/Plan: 
 
Sepsis 2/2 mrsa bacteremia Persistent mrsa bacteremia, presumptive infected av graft left arm. Latest blcs 3/4/21 reporting mrsa x 2, 3/6 pending Continue Vancomycin and ceftaroline for antibiotic coverage, switch to dapto? abx per ID. Infectious disease following 
SAYDA 3/4/21, no veggies 
 
  
End-stage renal disease on hemodialysis 
patient has a history of end-stage renal disease on hemodialysis with concerns of clotting of fistula Nephrology following. 
  
Hyperglycemia due to type 2 diabetesA1c 8. 6. 
continue ssi/hypoglycemia protocol. Lantus 6 units currently, titrate to 10 units. (pta is on 14 units) Constipation Bowel regimen Continue to monitor for bowel movements 
  
Hypertension 
continue home antihypertensive medications, Amlodipine/benazepril, hydralazine, atenolol. 
  
Hyperlipidemia 
continue statin AOCD: 
Did not receive transfusion with hd and was started overnight. This am during 2nd unit transfusion stopped as she feels unwell but only nausea specified- does not appear transfusion reaction related but am concerned for fluid overload though is maintaing adequate sats. Labs today are pending. Cxr/kub pending. Fluid overload? Lasix 80 mg x 1 given.  
  
ProphylaxisHeparin subcu FENrenal diet, potassium and magnesium to be repleted with dialysis Full code, Disposition- Pending clinical improvement, persistent mrsa bactermia, follow pending cultures, evolving blood cultures. Cardio, nephrology and ID appreciated. Care Plan discussed with: 
  Comments Patient X Family  X Hsband at bedside RN X   
Care Manager Consultant  X   
                 X Multidiciplinary team rounds were held today with , nursing, pharmacist and clinical coordinator. Patient's plan of care was discussed; medications were reviewed and discharge planning was addressed. ________________________________________________________________________ Total NON critical care TIME:  30   Minutes Comments >50% of visit spent in counseling and coordination of care X   
________________________________________________________________________ Timothy Hamilton MD

## 2021-03-07 NOTE — PROGRESS NOTES
Follow-up chest x-ray appears left lung is melvina out. Last x-ray from 224 not there. She is becoming more tachypneic. She is maintaining adequate sats. Discussed with nephrology and dialysis set up for the morning as we can do it here today but to me it does not look like it is fluid overload nonetheless. Could be pneumonia but she is on Vanco and ceftaroline which should be covering her. Perhaps a mucous plug. Consulting pulmonology. Discussed with ID. We will put her on BiPAP 14/8. We will put her on mucolytic's/acetylcysteine neb, DuoNebs 4 times daily, will get a CT scan of the chest without contrast and follow-up. Nephrology ID and pulmonology are  appreciated.

## 2021-03-07 NOTE — PROGRESS NOTES
Patient stated that she does not feel good. Vital all within normal limits. Patient states that she has had a cough since last night and had not had any sleep last and that she just do not feel right. Dr. Jennifer Sauceda called made aware he will come see patient.

## 2021-03-07 NOTE — PROGRESS NOTES
Critical lab called patient has a gram positive blood cultures one of the two bottles flag positive. Dr. Shelton Perez made aware no new orders noted at this time.

## 2021-03-07 NOTE — PROGRESS NOTES
Patient not having any sob at this time, buts states that she feel different. Vitals all within normal limits, no sob.  at bedside and MD paged.

## 2021-03-07 NOTE — PROGRESS NOTES
Called and spoke with Dr. Manju Beckham concerning patients resp  24-28, he asked me to call and make Dr. Radha James aware of patients condition. Dr. Radha James said patient will have dialysis 3/8/2021. Patient denies pain, discomfort and nausea at this time.

## 2021-03-07 NOTE — PROGRESS NOTES
Renal progress Note NAME:  Jorge Castle :   1955 MRN:   [id number] Date/Time:  3/7/2021 1:08 PM 
 
 
Subjective:  
Patient seen in the room.  at bedside. Patient looks significantly anxious and in distress today. Seems tachypneic. According to her symptoms started after she received a blood transfusion last night. She was dialyzed yesterday per schedule with 2 L of fluid removal 
 
REVIEW OF SYSTEMS:    
 
Constitutional: Negative for chills and fever. HENT: Negative. Eyes: Negative. Respiratory: Negative. Cardiovascular: Negative. Gastrointestinal: Negative for abdominal pain and nausea. Skin: Negative. Neurological: Negative. Objective: VITALS:   
Visit Vitals BP (!) 146/84 Pulse 84 Temp 98.4 °F (36.9 °C) Resp 20 Ht 5' 3\" (1.6 m) Wt 87.4 kg (192 lb 10.9 oz) SpO2 97% BMI 34.13 kg/m² Temp (24hrs), Av.1 °F (36.7 °C), Min:96.8 °F (36 °C), Max:100.6 °F (38.1 °C) PHYSICAL EXAM:  
General:    Alert, cooperative, seems to be in mild distress HEENT: JVD is not elevated Neck:  Supple, symmetrical,  thyroid: non tender Lungs:   Coarse crackles heard bilaterally Chest wall:  No tenderness  No Accessory muscle use. Heart:   Regular  rhythm,  No  murmur   No edema Abdomen:   Soft, non-tender. Not distended. Bowel sounds normal 
Extremities: No cyanosis. No clubbing. Left arm AV graft with no visible erythema over the skin. However a scab seen from the bleeding site Skin:     Not pale. Not Jaundiced  No rashes Psych:  Seems very anxious Neurologic: Alert and oriented x3 LAB DATA REVIEWED:   
Recent Results (from the past 24 hour(s)) GLUCOSE, POC Collection Time: 21  4:02 PM  
Result Value Ref Range Glucose (POC) 171 (H) 65 - 100 mg/dL Performed by Yemi Lyon, POC Collection Time: 21  8:14 PM  
Result Value Ref Range Glucose (POC) 175 (H) 65 - 100 mg/dL  Performed by Javan Denis Vika Powers GLUCOSE, POC Collection Time: 03/07/21  8:06 AM  
Result Value Ref Range Glucose (POC) 231 (H) 65 - 100 mg/dL Performed by Brendan Nolasco GLUCOSE, POC Collection Time: 03/07/21 11:07 AM  
Result Value Ref Range Glucose (POC) 297 (H) 65 - 100 mg/dL Performed by Brendan Nolasco Recommendations/Plan:  
#1 ESRD 
-She goes for dialysis at [facility] every TTS 
-She was last dialyzed on 3/6 with 2K bath with 2 L fluid removal 
-No uremic she seems to be volume overloaded today. . Tachypneic with crackles bilaterally and chest x-ray showing possible pulmonary edema 
-Unfortunately cannot do dialysis today because of staffing issues 
-I will order dialysis in a.m. for ultrafiltration only 
-Plan for dialysis again on Tuesday per schedule 
-Continue TTS schedule in the hospital 
 
#2  Shortness of breath 
-Patient is visibly tachypneic today. However oxygen sats are okay 
-Chest x-ray shows infiltrate versus pulmonary edema. She has crackles on physical exam 
-I will give her Lasix 80 mg IV stat. We will plan ultrafiltration in a.m. -Might need to change antibiotics to cover HAP #3 renal osteodystrophy 
-Not noted to be on any phosphorus binders. We will check phosphorus with next labs. Obtain PTH from outpatient and continue Zemplar if indicated 
-Calcium is okay #3 anemia acute on chronic 
-hemoglobin yesterday was 6.9 
-She received blood last night 
-Continue Procrit with dialysis #4 sepsis 
-Presented with fever over 103, leukocytosis and elevated lactic acid 
-Positive blood cultures with gram-positive cocci in clusters consistent with staph 
-Possible source is infected left arm AV graft 
-ID has been consulted. on IV vancomycin and ceftaroline 
-Has low-grade fever -Might need to change antibiotics since blood cultures have been positive despite>10 antibiotics 
-?   Graft removal if she remains bacteremic 
-Will probably need long-term antibiotics at least for 6 weeks. can be given on dialysis #5 hypertension 
-Blood pressure is low with amlodipine and hydralazine which I will continue #6 hyponatremia 
-Sodium today has improved 123->130.   
-Will plan dialysis with high sodium bath. -Restrict fluid intake #7 Hypokalemia: 
-K 5.2. Continue dialysis with 2K bath 
 
 
________________________________________________________________________ Signed: Lucy Kenny MD

## 2021-03-07 NOTE — CONSULTS
Pulmonary and Critical Care Consult Subjective:  
Consult Note: 3/7/2021 @no control Chief Complaint:  
Chief Complaint Patient presents with  Vascular Access Problem This patient has been seen and evaluated at the request of Dr. Jennifer Sauceda. Patient is a 77 y.o. female  
I'm asked to see for acute respiratory failure with hypoxia and lung infiltrate. Patient has a prior history of diabetes, end-stage renal disease on hemodialysis. Admitted 2/25/21 to [place] [address] with sepsis, gram-positive bacteremia, possibly from infected left AV graft. Treated with antibiotics, vancomycin and Zosyn. SAYDA, 3/5/21 showed no vegetations, but patient had persistent MRSA bacteremia. Given PRBCs overnight. This morning not feeling well and slightly nauseous. Subsequently became more short of breath and hypoxic. Chest x-ray showed dense left upper and midlung zone infiltrate with less significant right perihilar infiltrate. CT scan of the chest reviewed by me personally shows dense left upper lobe infiltrate with air bronchograms along with interstitial haziness left lower lobe and right perihilar area, possibly due to volume excess, along with small left-sided pleural effusion. No evidence of mucous plugging or volume loss. Review of Systems: A comprehensive review of systems was negative except for that written in the HPI. Past Medical History:  
Diagnosis Date  CAD (coronary artery disease)    
  stents x 2  Chronic kidney disease    
  t-th-s/ renal care in [facility] hghts  Diabetes ([facility] [address]    
  type 2  
 Hypercholesterolemia    
 Hypertension    
 Morbid obesity (HCC)    
  bmi 35  Renal insufficiency Past Surgical History:  
Procedure Laterality Date  COLONOSCOPY  EGD  HX APPENDECTOMY  2019  HX GASTRIC BYPASS  2009  
 gastric band  HX ORTHOPAEDIC Bilateral   
 ctr  HX VASCULAR ACCESS Left 2020  
 lorenzo cath for dialysis [address] SURG PROCEDURE UNLIST 2 cardiac stents  MI LAP, PLACE ADJUST GASTR BAND  7/29/10 [facility]  
 MI NEEDLE BIOPSY LIVER,W OTHR PROC  7/29/10 [address]  7/29/10 Family History Problem Relation Age of Onset  Heart Disease Mother  Diabetes Mother  Kidney Disease Mother Silvia Waddell Other Mother  Cancer Father  Emphysema Father  Asthma Sister  Heart Disease Sister  Diabetes Child Social History Tobacco Use  Smoking status: Never Smoker  Smokeless tobacco: Never Used Substance Use Topics  Alcohol use: No  
  
Current Facility-Administered Medications Medication Dose Route Frequency Provider Last Rate Last Admin  benzonatate (TESSALON) capsule 100 mg  100 mg Oral TID PRN Jamal PATEL MD   100 mg at 03/07/21 1014  insulin glargine (LANTUS) injection 10 Units  10 Units SubCUTAneous QHS Johnny Morris MD      
 DAPTOmycin (CUBICIN) 700 mg in sterile water (preservative free) 14 mL IV syringe RF formulation  8 mg/kg IntraVENous Q48H Maribel Ch MD      
 acetylcysteine (MUCOMYST) 200 mg/mL (20 %) solution 400 mg  400 mg Nebulization QID RT Johnny Morris MD      
 albuterol-ipratropium (DUO-NEB) 2.5 MG-0.5 MG/3 ML  3 mL Nebulization Q6H RT Johnny Morris MD      
 metoclopramide HCl (REGLAN) injection 10 mg  10 mg IntraVENous Q8H PRN Ruba Song MD   10 mg at 03/07/21 1251  prochlorperazine (COMPAZINE) with saline injection 5 mg  5 mg IntraVENous Q6H PRN Ruba Song MD   5 mg at 03/01/21 1730  cefTARoline (TEFLARO) 200 mg in 0.9% sodium chloride 50 mL IVPB  200 mg IntraVENous Q12H Ludwig Asif MD 50 mL/hr at 03/07/21 1018 200 mg at 03/07/21 1018  insulin lispro (HUMALOG) injection 4 Units  4 Units SubCUTAneous Jacinta Nuñez MD   4 Units at 03/07/21 1250  sodium chloride (NS) flush 5-10 mL  5-10 mL IntraVENous PRN Laura Narvaez Jules Constantino MD      
 aspirin delayed-release tablet 81 mg  81 mg Oral DAILY Melani Sapp MD   81 mg at 03/07/21 0945  atenoloL (TENORMIN) tablet 50 mg  50 mg Oral DAILY Noel Sapp MD   Stopped at 03/07/21 0900  clopidogreL (PLAVIX) tablet 75 mg  75 mg Oral DAILY Melani Sapp MD   75 mg at 03/07/21 0945  loratadine (CLARITIN) tablet 10 mg  10 mg Oral DAILY Melani Sapp MD   10 mg at 03/07/21 0900  multivitamin, tx-iron-ca-min (THERA-M w/ IRON) tablet 1 Tab  1 Tab Oral DAILY Noel Sapp MD   1 Tab at 03/07/21 0945  
 atorvastatin (LIPITOR) tablet 20 mg  20 mg Oral QHS Justus Mcdaniel MD   20 mg at 03/07/21 0021  
 glucose chewable tablet 16 g  4 Tab Oral PRLENNY Mcdaniel MD      
 dextrose (D50W) injection syrg 12.5-25 g  25-50 mL IntraVENous ANIBAL Mcdaniel MD      
 glucagon [facility]) injection 1 mg  1 mg IntraMUSCular ANIBAL Mcdaniel MD      
 insulin lispro (HUMALOG) injection   SubCUTAneous AC&HS Justus Mcdaniel MD   5 Units at 03/07/21 1130  
 sodium chloride (NS) flush 5-40 mL  5-40 mL IntraVENous Melani Harris MD   10 mL at 03/07/21 1400  
 sodium chloride (NS) flush 5-40 mL  5-40 mL IntraVENous ANIBAL Mcdaniel MD      
 acetaminophen (TYLENOL) tablet 650 mg  650 mg Oral Q6H ANIBAL Mcdaniel MD   650 mg at 03/07/21 1343 Or  acetaminophen (TYLENOL) suppository 650 mg  650 mg Rectal Q6H ANIBAL Mcdaniel MD      
 polyethylene glycol [facility]) packet 17 g  17 g Oral DAILY PRN Justus Mcdaniel MD   17 g at 03/06/21 1235  heparin (porcine) injection 5,000 Units  5,000 Units SubCUTAneous Q12H Noel Sapp MD   5,000 Units at 03/07/21 0211 No Known Allergies Objective:  
 
Blood pressure 139/74, pulse 78, temperature 97.4 °F (36.3 °C), resp. rate 20, height 5' 3\" (1.6 m), weight 87.4 kg (192 lb 10.9 oz), SpO2 96 %. Temp (24hrs), Av.9 °F (36.6 °C), Min:96.8 °F (36 °C), Max:100.6 °F (38.1 °C) Intake and Output: 
Current Shift: No intake/output data recorded. Last 3 Shifts: 1901 - 700 In: -  
Out:  Physical Exam:  
 
General: Lying in bed comfortably, somewhat tachypneic, on nasal cannula oxygen Eye: Reactive, symmetric Throat and Neck: Supple Lung: Reduced air entry bilaterally with crackles in left upper and midlung zone in both bases. Heart: S1+S2. No murmurs. tachycardia Abdomen: soft, non-tender. Bowel sounds normal. No masses; obese Extremities: 1-2+ edema lower extremities : Not done Skin: No cyanosis Neurologic: A & O x3. Grossly nonfocal 
Psychiatric: Appropriate affect; coherent Lab/Data Review: 
 
Recent Results (from the past 24 hour(s)) GLUCOSE, POC Collection Time: 21  8:14 PM  
Result Value Ref Range Glucose (POC) 175 (H) 65 - 100 mg/dL Performed by Becca Ross GLUCOSE, POC Collection Time: 21  8:06 AM  
Result Value Ref Range Glucose (POC) 231 (H) 65 - 100 mg/dL Performed by [employer] GLUCOSE, POC Collection Time: 21 11:07 AM  
Result Value Ref Range Glucose (POC) 297 (H) 65 - 100 mg/dL Performed by [employer] GLUCOSE, POC Collection Time: 21  4:06 PM  
Result Value Ref Range Glucose (POC) 208 (H) 65 - 100 mg/dL Performed by [facility]   
 
 
CT CHEST WO CONT Final Result Left upper lobe pneumonia. Small left pleural effusion. Bilateral  
groundglass airspace opacities, edema versus pneumonia. Atherosclerosis. Heavily  
calcified mitral annulus. XR CHEST PORT Final Result New findings of confluent airspace consolidation involving most of  
the left hemithorax, relatively sparing the left lung base, radiographic  
appearance suspicious for pneumonia, but there does appear to be background  
bilateral interstitial pulmonary edema with nodular groundglass opacity in the  
right upper hemithorax possibly representing asymmetric edema (versus  
pneumonia). Calcified aortic knob. Prominently calcified mitral annulus. Stable  
enlargement of cardiopericardial silhouette. Sequelae of laparoscopic gastric  
banding. XR ABD (KUB) Final Result Sequelae of laparoscopic gastric banding. Mild gaseous distention of  
the stomach. No evidence of intestinal obstruction. Colonic stool. Arterial  
calcification. XR CHEST PORT Final Result No acute abnormality seen. CT Results  (Last 48 hours) 03/07/21 1541  CT CHEST WO CONT Final result Impression:  Left upper lobe pneumonia. Small left pleural effusion. Bilateral  
groundglass airspace opacities, edema versus pneumonia. Atherosclerosis. Heavily  
calcified mitral annulus. Narrative:  Dose Reduction: All CT scans at this facility are performed using dose reduction optimization  
techniques as appropriate to a performed exam including the following: Automated  
exposure control, adjustments of the mA and/or kV according to patient size, or  
use of iterative reconstruction technique. Findings: Unenhanced images were obtained. Motion artifacts are present. Small  
left pleural effusion. Heavily calcified mitral annulus. Bilateral coronary  
artery calcification. Calcified aorta with heavily calcified and stented  
proximal left subclavian artery. Patchy groundglass opacities on the right,  
largely perihilar, suspicious for edema. More prominent groundglass opacities  
are seen in the left lower lobe, edema versus pneumonia, and there is airspace  
consolidation with air bronchograms in the left upper lobe (sparing the inferior  
lingula), consistent with pneumonia. Images through the lung bases and upper  
abdomen degraded by motion artifact. Sequelae of laparoscopic gastric banding.   
Left upper extremity stent and probable dialysis fistula. Assessment: 1. Acute respiratory failure with hypoxia. 2.  Bilateral pneumonia. Left upper lobe appears to be aspiration with dense pneumonia. 3.  Acute pulmonary edema. 4.  Left pleural effusion. 5.  Sepsis. 6. MRSA bacteremia. 7.  End-stage renal disease on hemodialysis. 8.  Hypertension. 9.  Constipation. 10.  Hyperglycemia Plan:  
 
Patient admitted to the hospital. 
Will be watched here closely. Patient's respiratory failure, appears to be a combination of underlying bilateral pulmonary edema and pleural effusion, due to volume excess, but overlying dense left upper lobe infiltrate with air bronchograms, possibly from aspiration Currently on nasal cannula oxygen. Being transitioned to BiPAP. I would recommend high flow oxygen as tolerated to keep saturation above 92%. If desaturates despite high flow would recommend BiPAP. Currently on broad-spectrum antibiotics per ID Appreciate ID input. Will await ID recommendations for any changes There also appears to be an element of pulmonary edema/volume overload Patient was undergo hemodialysis in a.m., 3/8/21. Appreciate nephrology input. Continue home antihypertensive medications. Monitor blood sugars. Continue Lantus. Cover with sliding scale. Bowel regimen for constipation DVT and GI prophylaxis Somewhat tenuous clinical status. Prognosis guarded Questions of patient were answered at bedside in detail Case discussed in detail with RN, RT, and care team 
Thank you for involving me in the care of this patient I will follow with you closely during hospitalization Time spent more than 60 minutes in direct patient care with no overlap reviewing results and records, decision making, and answering questions. Jag Osborne MD 
[facility] 3/7/2021 
4:33 PM

## 2021-03-08 NOTE — PROGRESS NOTES
Spiritual Care Assessment/Progress Note [facility] 
 
 
NAME: Patrice Kee      MRN: [id number] AGE: 77 y.o. SEX: female Evangelical Affiliation: [employer]  
Language: [place] 3/8/2021     Total Time (in minutes): 7 Spiritual Assessment begun in [address] through conversation with: 
  
    []Patient        [x] Family    [] Friend(s) Reason for Consult: Initial/Spiritual assessment, patient floor Spiritual beliefs: (Please include comment if needed) 
   [] Identifies with a edison tradition:     
   [] Supported by a edison community:        
   [] Claims no spiritual orientation:       
   [] Seeking spiritual identity:            
   [] Adheres to an individual form of spirituality:       
   [x] Not able to assess:                   
 
    
Identified resources for coping:  
   [] Prayer                           
   [] Music                  [] Guided Imagery [x] Family/friends                 [] Pet visits [] Devotional reading                         [] Unknown 
   [] Other:                                          
 
 
Interventions offered during this visit: (See comments for more details) Family/Friend(s): Affirmation of emotions/emotional suffering, Bridging, Catharsis/review of pertinent events in supportive environment, Coping skills reviewed/reinforced, Initial Assessment, Normalization of emotional/spiritual concerns, Prayer (assurance of) Plan of Care: 
 
 [] Support spiritual and/or cultural needs  
 [] Support AMD and/or advance care planning process    
 [] Support grieving process 
 [] Coordinate Rites and/or Rituals  
 [] Coordination with community clergy  [] No spiritual needs identified at this time 
 [] Detailed Plan of Care below (See Comments)  [] Make referral to Music Therapy 
[] Make referral to Pet Therapy    
[] Make referral to Addiction services 
[] Make referral to [facility] 
[] Make referral to Spiritual Care Partner 
[] No future visits requested       
[x] Follow up upon further referrals Comments:  Visited patient in the [facility] med/oncology unit during rounds. Per current contact isolation protocol in effect, I attempted visit via hospital telephone. Patient's daughter answered the phone and I provided support. She stated it has been hard to see her mother suffering which causes her to be tearful. She is hopeful that her mother will get well in the future. I listened with empathy and reflection while exploring her needs and resources. I normalized the daughter's concern for her mother and affirmed her caring support. I facilitated storytelling and advised of  availability and prayer support. Chaplains will follow up if further referrals are requested. Chaplain Umu Stuart M.Div.  can be reached by calling the  at [facility] 
[phone]

## 2021-03-08 NOTE — ROUTINE PROCESS
Bedside shift change report given to Koby Messina RN (oncoming nurse) by Jaclyn Chavez RN (offgoing nurse). Report included the following information SBAR, Kardex, and MAR.

## 2021-03-08 NOTE — DIALYSIS
Patient presented to dialysis and orders was given for an ultrafiltration for 3 hours and to remove 2-3 liters. Tx was started and after 30 minutes patient requested to discontinue tx related to feeling worse than she did when she came in. She started feeling weak and hot. Bp was noted to be 99/61. AMA was signed and patient was made aware of risks involved. Pt. Verbalized understanding. Dr. Nay Crockett and floor nurse made aware

## 2021-03-08 NOTE — PROGRESS NOTES
Pulmonary and Critical Care Progress Note Subjective:  
 
Patient seen and examined in her room this afternoon, no acute events overnight. Patient attempted to go down for hemodialysis today, but continued to feel very poorly, stopped her dialysis session after 30 minutes because she felt bad. Repeat chest x-ray today showing a dense consolidation throughout the left lung which is essentially unchanged from her prior imaging, continued mild patchy densities in the right lung. Patient has not had any fevers in the past 24 hours Surveillance blood cultures ordered for today. She is currently saturating 86% on 5 L nasal cannula, therefore I increased her to 6 L nasal cannula. She is still getting Mucomyst nebs every 6 hours and duo nebs every 6 hours, I will add in chest physiotherapy every 6 hours and guaifenesin ER 1200 mg twice daily. Review of Systems: A comprehensive review of systems was negative except for that written in the HPI. Current Facility-Administered Medications Medication Dose Route Frequency Provider Last Rate Last Admin  acetylcysteine (MUCOMYST) 200 mg/mL (20 %) solution 400 mg  400 mg Nebulization Q6H RT Johnny Brown MD      
 guaiFENesin ER [facility]) tablet 1,200 mg  1,200 mg Oral Q12H Arthur Mchugh DO      
 benzonatate (TESSALON) capsule 100 mg  100 mg Oral TID PRN Cathryn PATEL MD   100 mg at 03/07/21 2337  insulin glargine (LANTUS) injection 10 Units  10 Units SubCUTAneous QHS Hemant Khalil MD   10 Units at 03/07/21 2337  DAPTOmycin (CUBICIN) 700 mg in sterile water (preservative free) 14 mL IV syringe RF formulation  8 mg/kg IntraVENous Q48H Maribel Ch MD   700 mg at 03/07/21 1801  
 albuterol-ipratropium (DUO-NEB) 2.5 MG-0.5 MG/3 ML  3 mL Nebulization Q6H RT Cathryn PATEL MD   3 mL at 03/08/21 0250  hydrOXYzine pamoate (VISTARIL) capsule 50 mg  50 mg Oral TID PRN Kia Maddox MD   50 mg at 03/07/21 2337  metroNIDAZOLE (FLAGYL) IVPB premix 500 mg  500 mg IntraVENous Q8H Maribel Ch  mL/hr at 03/08/21 0510 500 mg at 03/08/21 0510  
 metoclopramide HCl (REGLAN) injection 10 mg  10 mg IntraVENous Q8H PRN Misbah Ellis MD   10 mg at 03/07/21 1251  prochlorperazine (COMPAZINE) with saline injection 5 mg  5 mg IntraVENous Q6H PRN Misbah Ellis MD   5 mg at 03/01/21 1730  cefTARoline (TEFLARO) 200 mg in 0.9% sodium chloride 50 mL IVPB  200 mg IntraVENous Q12H Levi Armstrong MD 50 mL/hr at 03/07/21 2336 200 mg at 03/07/21 2336  
 insulin lispro (HUMALOG) injection 4 Units  4 Units SubCUTAneous [facility]   4 Units at 03/07/21 1757  sodium chloride (NS) flush 5-10 mL  5-10 mL IntraVENous PRN Misbah Ellis MD      
 aspirin delayed-release tablet 81 mg  81 mg Oral DAILY Melani Sapp MD   81 mg at 03/07/21 0945  atenoloL (TENORMIN) tablet 50 mg  50 mg Oral DAILY Joleen Sapp MD   Stopped at 03/07/21 0900  clopidogreL (PLAVIX) tablet 75 mg  75 mg Oral DAILY Melani Sapp MD   75 mg at 03/07/21 0945  loratadine (CLARITIN) tablet 10 mg  10 mg Oral DAILY Melani Sapp MD   10 mg at 03/07/21 0900  multivitamin, tx-iron-ca-min (THERA-M w/ IRON) tablet 1 Tab  1 Tab Oral DAILY Misbah Ellis MD   1 Tab at 03/07/21 0945  
 glucose chewable tablet 16 g  4 Tab Oral PRN Misbah Ellis MD      
 dextrose (D50W) injection syrg 12.5-25 g  25-50 mL IntraVENous PRN Misbah Ellis MD      
 glucagon [facility]) injection 1 mg  1 mg IntraMUSCular PRN Misbah Ellis MD      
 insulin lispro (HUMALOG) injection   SubCUTAneous AC&HS Misbah Ellis MD   Stopped at 03/07/21 2200  
 sodium chloride (NS) flush 5-40 mL  5-40 mL IntraVENous Melani Coley MD   Stopped at 03/07/21 2200  
 sodium chloride (NS) flush 5-40 mL  5-40 mL IntraVENous PRN Kailey Hernandez MD      
 acetaminophen (TYLENOL) tablet 650 mg  650 mg Oral Q6H PRN Kailey Hernandez MD   650 mg at 21 9139 Or  acetaminophen (TYLENOL) suppository 650 mg  650 mg Rectal Q6H PRN Kailey Hernandez MD      
 polyethylene glycol [facility]) packet 17 g  17 g Oral DAILY PRN Kailey Hernandez MD   17 g at 21 1235  heparin (porcine) injection 5,000 Units  5,000 Units SubCUTAneous Q12H Melani Sapp MD   5,000 Units at 21 9437 No Known Allergies Objective:  
 
Blood pressure (!) 126/59, pulse 83, temperature 97.1 °F (36.2 °C), resp. rate 16, height 5' 3\" (1.6 m), weight 87.3 kg (192 lb 7.4 oz), SpO2 96 %. Temp (24hrs), Av.5 °F (36.4 °C), Min:96.3 °F (35.7 °C), Max:98.4 °F (36.9 °C) Intake and Output: 
Current Shift: 701 - 1900 In: -  
Out: 285 Last 3 Shifts: 1901 - 700 In: -  
Out: 2 Physical Exam:  
 
General: Lying in bed comfortably, somewhat tachypneic, on 6 L nasal cannula oxygen. Lethargic, not feeling well. Eye: Reactive, symmetric Throat and Neck: Supple Lung: Reduced air entry bilaterally with crackles in left upper and midlung zone in both bases. Currently on 6 L nasal cannula. Heart: S1+S2. No murmurs. tachycardia Abdomen: soft, non-tender. Bowel sounds normal. No masses; obese Extremities: 1-2+ edema lower extremities : Not done Skin: No cyanosis Neurologic: A & O x3 but she is lethargic. Grossly nonfocal 
Psychiatric: Appropriate affect; coherent Lab/Data Review: 
 
Recent Results (from the past 24 hour(s)) CBC WITH AUTOMATED DIFF Collection Time: 21  3:59 PM  
Result Value Ref Range WBC 21.1 (H) 3.6 - 11.0 K/uL  
 RBC 3.58 (L) 3.80 - 5.20 M/uL  
 HGB 10.9 (L) 11.5 - 16.0 g/dL HCT 33.3 (L) 35.0 - 47.0 %  MCV 93.0 80.0 - 99.0 FL  
 MCH 30.4 26.0 - 34.0 PG  
 MCHC 32.7 30.0 - 36.5 g/dL  
 RDW 15.2 (H) 11.5 - 14.5 % PLATELET 339 983 - 456 K/uL MPV 11.7 8.9 - 12.9 FL  
 NEUTROPHILS 81 (H) 32 - 75 % LYMPHOCYTES 9 (L) 12 - 49 % MONOCYTES 7 5 - 13 % EOSINOPHILS 1 0 - 7 % BASOPHILS 0 0 - 1 % IMMATURE GRANULOCYTES 2 (H) 0.0 - 0.5 % ABS. NEUTROPHILS 17.5 (H) 1.8 - 8.0 K/UL  
 ABS. LYMPHOCYTES 2.0 0.8 - 3.5 K/UL  
 ABS. MONOCYTES 1.5 (H) 0.0 - 1.0 K/UL  
 ABS. EOSINOPHILS 0.2 0.0 - 0.4 K/UL  
 ABS. BASOPHILS 0.1 0.0 - 0.1 K/UL  
 ABS. IMM. GRANS. 0.4 (H) 0.00 - 0.04 K/UL  
 DF AUTOMATED METABOLIC PANEL, BASIC Collection Time: 03/07/21  3:59 PM  
Result Value Ref Range Sodium 129 (L) 136 - 145 mmol/L Potassium 5.0 3.5 - 5.1 mmol/L Chloride 94 (L) 97 - 108 mmol/L  
 CO2 27 21 - 32 mmol/L Anion gap 8 5 - 15 mmol/L Glucose 193 (H) 65 - 100 mg/dL BUN 31 (H) 6 - 20 mg/dL Creatinine 4.94 (H) 0.55 - 1.02 mg/dL BUN/Creatinine ratio 6 (L) 12 - 20 GFR est AA 11 (L) >60 ml/min/1.73m2 GFR est non-AA 9 (L) >60 ml/min/1.73m2 Calcium 9.1 8.5 - 10.1 mg/dL GLUCOSE, POC Collection Time: 03/07/21  4:06 PM  
Result Value Ref Range Glucose (POC) 208 (H) 65 - 100 mg/dL Performed by Matilda Prasad, POC Collection Time: 03/07/21  7:57 PM  
Result Value Ref Range Glucose (POC) 178 (H) 65 - 100 mg/dL Performed by Alicia Fajardo Collection Time: 03/08/21  8:00 AM  
Result Value Ref Range Sodium 126 (L) 136 - 145 mmol/L Potassium 5.6 (H) 3.5 - 5.1 mmol/L Chloride 92 (L) 97 - 108 mmol/L  
 CO2 22 21 - 32 mmol/L Anion gap 12 5 - 15 mmol/L Glucose 277 (H) 65 - 100 mg/dL BUN 39 (H) 6 - 20 mg/dL Creatinine 5.73 (H) 0.55 - 1.02 mg/dL BUN/Creatinine ratio 7 (L) 12 - 20 GFR est AA 9 (L) >60 ml/min/1.73m2 GFR est non-AA 7 (L) >60 ml/min/1.73m2 Calcium 8.7 8.5 - 10.1 mg/dL CBC WITH AUTOMATED DIFF Collection Time: 03/08/21  8:00 AM  
Result Value Ref Range  WBC 26.4 (H) 3.6 - 11.0 K/uL  
 RBC 3.19 (L) 3.80 - 5.20 M/uL HGB 9.4 (L) 11.5 - 16.0 g/dL HCT 29.2 (L) 35.0 - 47.0 % MCV 91.5 80.0 - 99.0 FL  
 MCH 29.5 26.0 - 34.0 PG  
 MCHC 32.2 30.0 - 36.5 g/dL  
 RDW 15.2 (H) 11.5 - 14.5 % PLATELET 334 463 - 488 K/uL MPV 11.9 8.9 - 12.9 FL  
 NEUTROPHILS 87 (H) 32 - 75 % LYMPHOCYTES 7 (L) 12 - 49 % MONOCYTES 5 5 - 13 % EOSINOPHILS 0 0 - 7 % BASOPHILS 0 0 - 1 % IMMATURE GRANULOCYTES 1 (H) 0.0 - 0.5 % ABS. NEUTROPHILS 23.3 (H) 1.8 - 8.0 K/UL  
 ABS. LYMPHOCYTES 1.8 0.8 - 3.5 K/UL  
 ABS. MONOCYTES 1.3 (H) 0.0 - 1.0 K/UL  
 ABS. EOSINOPHILS 0.0 0.0 - 0.4 K/UL  
 ABS. BASOPHILS 0.0 0.0 - 0.1 K/UL  
 ABS. IMM. GRANS. 0.2 (H) 0.00 - 0.04 K/UL  
 DF AUTOMATED    
GLUCOSE, POC Collection Time: 03/08/21 11:27 AM  
Result Value Ref Range Glucose (POC) 293 (H) 65 - 100 mg/dL Performed by [facility] Final Result CT CHEST WO CONT Final Result Left upper lobe pneumonia. Small left pleural effusion. Bilateral  
groundglass airspace opacities, edema versus pneumonia. Atherosclerosis. Heavily  
calcified mitral annulus. XR CHEST PORT Final Result New findings of confluent airspace consolidation involving most of  
the left hemithorax, relatively sparing the left lung base, radiographic  
appearance suspicious for pneumonia, but there does appear to be background  
bilateral interstitial pulmonary edema with nodular groundglass opacity in the  
right upper hemithorax possibly representing asymmetric edema (versus  
pneumonia). Calcified aortic knob. Prominently calcified mitral annulus. Stable  
enlargement of cardiopericardial silhouette. Sequelae of laparoscopic gastric  
banding. XR ABD (KUB) Final Result Sequelae of laparoscopic gastric banding. Mild gaseous distention of  
the stomach. No evidence of intestinal obstruction. Colonic stool. Arterial  
calcification. XR CHEST PORT Final Result No acute abnormality seen. CT Results  (Last 48 hours) 03/07/21 1541  CT CHEST WO CONT Final result Impression:  Left upper lobe pneumonia. Small left pleural effusion. Bilateral  
groundglass airspace opacities, edema versus pneumonia. Atherosclerosis. Heavily  
calcified mitral annulus. Narrative:  Dose Reduction: All CT scans at this facility are performed using dose reduction optimization  
techniques as appropriate to a performed exam including the following: Automated  
exposure control, adjustments of the mA and/or kV according to patient size, or  
use of iterative reconstruction technique. Findings: Unenhanced images were obtained. Motion artifacts are present. Small  
left pleural effusion. Heavily calcified mitral annulus. Bilateral coronary  
artery calcification. Calcified aorta with heavily calcified and stented  
proximal left subclavian artery. Patchy groundglass opacities on the right,  
largely perihilar, suspicious for edema. More prominent groundglass opacities  
are seen in the left lower lobe, edema versus pneumonia, and there is airspace  
consolidation with air bronchograms in the left upper lobe (sparing the inferior  
lingula), consistent with pneumonia. Images through the lung bases and upper  
abdomen degraded by motion artifact. Sequelae of laparoscopic gastric banding. Left upper extremity stent and probable dialysis fistula. Assessment: 1. Acute respiratory failure with hypoxia. 2.  Bilateral pneumonia. Left upper lobe appears to be aspiration with dense pneumonia. 3.  Acute pulmonary edema. 4.  Left pleural effusion. 5.  Sepsis. 6. MRSA bacteremia. 7.  End-stage renal disease on hemodialysis. 8.  Hypertension. 9.  Constipation. 10.  Hyperglycemia Plan:  
 
Continue to remain on the floor while still on nasal cannula. If escalating supplemental oxygen requirements, would recommend transfer to the ICU. Patient's respiratory failure, appears to be a combination of underlying bilateral pulmonary edema and pleural effusion as well as dense, likely MRSA pneumonia. Currently on nasal cannula oxygen at 6 L nasal cannula. If needed, can transition to high flow nasal cannula. If escalating oxygen requirements, recommend checking an ABG. Goal O2 sats greater than 90%. Currently on broad-spectrum antibiotics per ID, currently on IV ceftaroline/daptomycin/Flagyl. Surveillance blood cultures still positive, repeat today. Sputum culture unable to be obtained thus far. Appreciate ID input. Will await ID recommendations for any changes Continue on scheduled Mucomyst nebs every 6 hours, duo nebs every 6 hours, and will add guaifenesin ER 1200 mg p.o. twice daily as well as chest physiotherapy every 6 hours. There also appears to be an element of pulmonary edema/volume overload Unfortunately, the patient only tolerated about 30 minutes of hemodialysis today. I am certainly worried about her hyperkalemia, would recommend repeating a basic metabolic panel this afternoon and consider temporizing measures. Also strongly consider repeat session of hemodialysis tomorrow. Appreciate nephrology input. Check proBNP level in the morning. Bowel regimen for constipation DVT and GI prophylaxis Somewhat tenuous clinical status. Prognosis guarded Questions of patient were answered at bedside in detail Case discussed in detail with RN, RT, and care team 
Thank you for involving me in the care of this patient I will follow with you closely during hospitalization Time spent more than 35 minutes in direct patient care with no overlap reviewing results and records, decision making, and answering questions. Cynthia Lau,  
[facility] 3/8/2021 
4:33 PM

## 2021-03-08 NOTE — CONSULTS
This 24-year-old woman is well-known to me from previous graft removal for sepsis I think about a year ago. Her sepsis did clear with removal of the graft and she ended up going up to [address] and getting another graft in the same arm. She did well with this until a little over a week ago when she started having fevers. She developed positive blood cultures with MRSA these have been persistently positive since then. Despite good MRSA antibiotics. He did not have any pulmonary complaints and has not had a chest x-ray since 26 February 2 is clear. He did develop some shortness of breath a couple days ago and has now been found to have a large left-sided pneumonia however she had a SAYDA she did have a 2D echo which showed a possible tricuspid vegetation. However she had a SAYDA after that that failed to confirm this vegetation. On exam she is sweaty and looks like she feels bad. Is alert and oriented cooperative. He has a graft in the left upper arm which appears to originate and and at the brachial neurovascular bundle in the upper portion of the upper arm. The graft has a good thrill in it. The number of needle hole sticks on portions of it. There is no tenderness or redness along the graft. The patient has had no dialysis accesses on the right arm. I feel a good right brachial and radial pulse. I cannot feel a radial pulse on the left side. I have talked to Dr. Zurdo Giron who sees her for infectious disease and Dr. Nile Mahmood by phone. Dr. Zurdo Giron is against taking her graft out and thinks it further antibiotics have a chance of clearing her bacteremia. Dr. Nile Mahmood who has decided to get pulmonary medicine involved to see if they think this could be all related to her pneumonia. Certainly the patient is a high risk for surgery given her pneumonia. However if this infection is from the graft it may well not clear with antibiotics.   I will follow her along and if we can become relatively certain this is from her graft I would favor taking it out. Here much for this consultation.

## 2021-03-08 NOTE — PROGRESS NOTES
Hospitalist Progress Note NAME: Arminda Louie :  1955 MRN:  [id number] Subjective:  
 
Seen at bedside: 
Persistent mrsa bacteremia- latest blcs 3/4=mrsa x 2. Repeat 3/6 is growing gpc. Discovered dense leeft sided pna 3/7. Dialysis cut short today as  She felt bad after 30 minutes. Repeat cxr w/ dense consolidation left lung, essentially unchanged from yesterday. Now on 5 l/nc @ 98%. She's appearing acutely ill. She remains afebrile No vegies on darinel 3/5/21 Persistent mrsa bacteremia suspected 2/2 infected avg. 
3/3 blcs ng 
3/4 mrsa x2 
3/6 gpc again isolated. Review of Systems: 
Symptom Y/N Comments  Symptom Y/N Comments Fever/Chills Y   Chest Pain N Poor Appetite N   Edema N   
Cough N   Abdominal Pain N Sputum N   Joint Pain N   
SOB/RIGGS N   Pruritis/Rash N   
Nausea/vomit N   Tolerating PT/OT NA Diarrhea N   Tolerating Diet Y Constipation N   Other Objective: VITALS:  
Last 24hrs VS reviewed since prior progress note. Most recent are: 
Patient Vitals for the past 24 hrs: 
 Temp Pulse Resp BP SpO2  
21 1606 98 °F (36.7 °C) 85 16 139/64 98 % 21 1400  79  119/62 94 % 21 0945 97.1 °F (36.2 °C) 83 16 (!) 126/59 96 % 21 0845  72  (!) 96/51   
21 0815 98.4 °F (36.9 °C) 80  (!) 115/58   
21 0735     93 % 21 0111 97.4 °F (36.3 °C) (!) 101 20 (!) 140/74 96 % 21 2114 (!) 96.3 °F (35.7 °C) 77 20 130/65 92 % 21 1936     92 % Intake/Output Summary (Last 24 hours) at 3/8/2021 1719 Last data filed at 3/8/2021 3258 Gross per 24 hour Intake  Output 285 ml Net -285 ml PHYSICAL EXAM: 
General: Looks sluggish, not distressed. EENT:  EOMI. Anicteric sclerae. MMM Resp:  Rales left lower lung fieldd, not labored, sa02 98%- 5 l now. CV:  Regular  rhythm,  No edema GI:  Soft, Non distended, Non tender. +Bowel sounds Neurologic:  Alert and oriented X 3, normal speech, hard of hearing. Psych:   Good insight. Not anxious nor agitated Skin:  No rashes. No jaundice lue avf. Procedures: see electronic medical records for all procedures/Xrays and details which were not copied into this note but were reviewed prior to creation of Plan. LABS: 
I reviewed today's most current labs and imaging studies. Pertinent labs include: 
Recent Results (from the past 12 hour(s)) METABOLIC PANEL, BASIC Collection Time: 03/08/21  8:00 AM  
Result Value Ref Range Sodium 126 (L) 136 - 145 mmol/L Potassium 5.6 (H) 3.5 - 5.1 mmol/L Chloride 92 (L) 97 - 108 mmol/L  
 CO2 22 21 - 32 mmol/L Anion gap 12 5 - 15 mmol/L Glucose 277 (H) 65 - 100 mg/dL BUN 39 (H) 6 - 20 mg/dL Creatinine 5.73 (H) 0.55 - 1.02 mg/dL BUN/Creatinine ratio 7 (L) 12 - 20 GFR est AA 9 (L) >60 ml/min/1.73m2 GFR est non-AA 7 (L) >60 ml/min/1.73m2 Calcium 8.7 8.5 - 10.1 mg/dL CBC WITH AUTOMATED DIFF Collection Time: 03/08/21  8:00 AM  
Result Value Ref Range WBC 26.4 (H) 3.6 - 11.0 K/uL  
 RBC 3.19 (L) 3.80 - 5.20 M/uL HGB 9.4 (L) 11.5 - 16.0 g/dL HCT 29.2 (L) 35.0 - 47.0 % MCV 91.5 80.0 - 99.0 FL  
 MCH 29.5 26.0 - 34.0 PG  
 MCHC 32.2 30.0 - 36.5 g/dL  
 RDW 15.2 (H) 11.5 - 14.5 % PLATELET 205 405 - 758 K/uL MPV 11.9 8.9 - 12.9 FL  
 NEUTROPHILS 87 (H) 32 - 75 % LYMPHOCYTES 7 (L) 12 - 49 % MONOCYTES 5 5 - 13 % EOSINOPHILS 0 0 - 7 % BASOPHILS 0 0 - 1 % IMMATURE GRANULOCYTES 1 (H) 0.0 - 0.5 % ABS. NEUTROPHILS 23.3 (H) 1.8 - 8.0 K/UL  
 ABS. LYMPHOCYTES 1.8 0.8 - 3.5 K/UL  
 ABS. MONOCYTES 1.3 (H) 0.0 - 1.0 K/UL  
 ABS. EOSINOPHILS 0.0 0.0 - 0.4 K/UL  
 ABS. BASOPHILS 0.0 0.0 - 0.1 K/UL  
 ABS. IMM. GRANS. 0.2 (H) 0.00 - 0.04 K/UL  
 DF AUTOMATED    
GLUCOSE, POC Collection Time: 03/08/21 11:27 AM  
Result Value Ref Range Glucose (POC) 293 (H) 65 - 100 mg/dL Performed by [facility] BEAN   
C REACTIVE PROTEIN, QT  Collection Time: 03/08/21  2:56 PM  
Result Value Ref Range C-Reactive protein 19.00 (H) 0.00 - 0.60 mg/dL CK Collection Time: 03/08/21  2:56 PM  
Result Value Ref Range CK 42 26 - 192 U/L  
GLUCOSE, POC Collection Time: 03/08/21  4:04 PM  
Result Value Ref Range Glucose (POC) 207 (H) 65 - 100 mg/dL Performed by Monika Pederson Reviewed most current lab test results and cultures  YES Reviewed most current radiology test results   YES Review and summation of old records today    NO Reviewed patient's current orders and MAR    YES 
PMH/SH reviewed - no change compared to H&P 
______________________________________ Assessment/Plan: 
 
Sepsis 2/2 mrsa bacteremia Persistent mrsa bacteremia, presumptive infected av graft left arm. Similar presentation 1 yearago, left arm av graft had to be removed to control bacteremia, could not be completely removed without compromising limb reportedly. Graft was replaced 9/2020. Left arm avg will likely need to be removed before blcs can be sterilized- ID appreciated. Latest blcs 3/4/21 reporting mrsa x 2, 3/6 again growing gpc Vanco stopped in favor of Daptomycin, ceftaroline continued. (lipitor held 2/2 interaction with dapto)-ck 42. SAYDA 3/4/21, no veggies Acute resp failure with hypoxia Bilateral pna- william appears to be aspiration with dense pna 
maintrain sao2>90%, if escalating O2 requirements may need icu transfer. Currently 5 l per nc @ 98%. Underlying pulmonary edema contributing likely as well. Continues ceftaroline/daptomycin/flagyl 
  
End-stage renal disease on hemodialysis 
patient has a history of end-stage renal disease on hemodialysis with concerns of clotting of fistula Nephrology following. HD reportedly stopped after 30 min today \"didn't feel well\"(bp remains adequate) 
  
Hyperglycemia due to type 2 diabetesA1c 8. 6. 
continue ssi/hypoglycemia protocol. Remains above goal, titrate to pta lantus 14 units every day.  
 
Constipation Bowel regimen Continue to monitor for bowel movements 
  
Hypertension 
continue home antihypertensive medications, Amlodipine/benazepril, hydralazine, atenolol. 
  
Hyperlipidemia Statin held while on daptomycin. Ck 42. AOCD: 
Did not receive transfusion with hd and was started overnight 3/7. during 2nd unit transfusion stopped as she felt unwell but only nausea specified- appeared developed lt sided pneumonia.  Subsequent hgb 10.9 and 9.4(3/8) ProphylaxisHeparin subcu FENrenal diet, potassium and magnesium to be repleted with dialysis Full code, Disposition- Pending clinical improvement, persistent mrsa bactermia, follow pending cultures, evolving blood cultures. Cardio, nephrology and ID appreciated. Will likely need av graft removed to clear bacteremia. Dr Marcelino Ritter in to see now. Care Plan discussed with: 
  Comments Patient X Family  X Hsband at bedside RN X   
Care Manager Consultant  X   
                 X Multidiciplinary team rounds were held today with , nursing, pharmacist and clinical coordinator. Patient's plan of care was discussed; medications were reviewed and discharge planning was addressed. ________________________________________________________________________ Total NON critical care TIME:  30   Minutes Comments >50% of visit spent in counseling and coordination of care X   
________________________________________________________________________ Adrianna Shepard MD

## 2021-03-09 NOTE — PROGRESS NOTES
Tx initiated via a patent LUAG. Access is noted to be warm to touch. Clot aspiration noted with arterial and venous stick. Patient complains of weakness and SOB. Patient was brought to this writer on 6lL of O2 via NC 
0900 Dr. Corina Choudhary called to unit to assess patient. 0345 Dr. Corina Choudhary at bedside assessing patient. Respirations noted to be labored. 4085. Patient complained of feeling hot and weak. /67, BS= 245, Non -re breather applied Rapid Response Called. Tx ended and was transferred to ICU bed 261

## 2021-03-09 NOTE — PROGRESS NOTES
Pt. Currently on OT caseload, however, per chart review pt transferred to the ICU from room 423 due to a medical decline. Will require new OT orders once pt is medically appropriate for evaluation.

## 2021-03-09 NOTE — PROGRESS NOTES
Pulmonary and Critical Care Progress Note Subjective:  
 
Patient seen and examined in her room in the ICU this morning, no acute events overnight but she again apparently became tachypneic and diaphoretic during dialysis and was transferred to the ICU. She wa on nonrebreather and it is unclear what her saturations were, but she was transitioned to BiPAP 14/6/50% and she is currently saturating 97%. I discussed the case in detail with the bedside nurse and the respiratory therapist, will check an ABG now and if this looks okay, we will try to transition her off of BiPAP to a mid flow nasal cannula. Patient states that she feels much better on the BiPAP. I discussed the case with the nephrology team, planning for temporary hemodialysis catheter tomorrow and re-attempt at dialysis in the ICU tomorrow. Repeat chest x-ray again today showing a dense consolidation throughout the left lung which is essentially unchanged from her prior imaging, continued mild patchy densities in the right lung. Patient has not had any fevers in the past 48 hours. Leukocytosis improving. Blood cultures from 3/6/2021 still positive for MRSA, blood cultures from 3/8/2021 are no growth to date, repeat cultures have been ordered for today as well. She is getting aggressive pulmonary hygiene for her severe pneumonia. Still unable to produce an adequate sputum sample, and she is too unstable for bronchoscopy unless she were to be intubated. However, at this time she has no acute indication for intubation. She is doing well on BiPAP. Review of Systems: A comprehensive review of systems was negative except for that written in the HPI. Current Facility-Administered Medications Medication Dose Route Frequency Provider Last Rate Last Admin  acetylcysteine (MUCOMYST) 200 mg/mL (20 %) solution 400 mg  400 mg Nebulization Q6H RT Lisha PATEL MD   400 mg at 03/09/21 0648  
 guaiFENesin ER (MUCINEX) tablet 1,200 mg  1,200 mg Oral Q12H Lolita ArthurDO jason   1,200 mg at 03/08/21 2251  insulin glargine (LANTUS) injection 14 Units  14 Units SubCUTAneous QHS Del Contreras MD   14 Units at 03/08/21 2251  benzonatate (TESSALON) capsule 100 mg  100 mg Oral TID PRN Jamal PATEL MD   100 mg at 03/07/21 2337  DAPTOmycin (CUBICIN) 700 mg in sterile water (preservative free) 14 mL IV syringe RF formulation  8 mg/kg IntraVENous Q48H Maribel Ch MD   700 mg at 03/07/21 1801  
 albuterol-ipratropium (DUO-NEB) 2.5 MG-0.5 MG/3 ML  3 mL Nebulization Q6H RT Jamal PATEL MD   3 mL at 03/09/21 6032  hydrOXYzine pamoate (VISTARIL) capsule 50 mg  50 mg Oral TID PRN Mario Pinto MD   50 mg at 03/07/21 2337  
 metroNIDAZOLE (FLAGYL) IVPB premix 500 mg  500 mg IntraVENous Q8H Maribel Ch  mL/hr at 03/09/21 0545 500 mg at 03/09/21 0545  metoclopramide HCl (REGLAN) injection 10 mg  10 mg IntraVENous Q8H PRN Ruba Song MD   10 mg at 03/07/21 1251  prochlorperazine (COMPAZINE) with saline injection 5 mg  5 mg IntraVENous Q6H PRN Ruba Song MD   5 mg at 03/01/21 1730  cefTARoline (TEFLARO) 200 mg in 0.9% sodium chloride 50 mL IVPB  200 mg IntraVENous Q12H Ludwig Asif MD 50 mL/hr at 03/08/21 2250 200 mg at 03/08/21 2250  
 insulin lispro (HUMALOG) injection 4 Units  4 Units SubCUTAneous Kaleb David MD   4 Units at 03/08/21 1806  sodium chloride (NS) flush 5-10 mL  5-10 mL IntraVENous PRN Ruba Song MD      
 aspirin delayed-release tablet 81 mg  81 mg Oral DAILY Melani Sapp MD   81 mg at 03/07/21 0945  atenoloL (TENORMIN) tablet 50 mg  50 mg Oral DAILY Melani Sapp MD   50 mg at 03/08/21 1806  clopidogreL (PLAVIX) tablet 75 mg  75 mg Oral DAILY Melani Sapp MD   75 mg at 03/08/21 1807  loratadine (CLARITIN) tablet 10 mg  10 mg Oral DAILY Sekou Melani Duran MD   10 mg at 21 0900  
• multivitamin, tx-iron-ca-min (THERA-M w/ IRON) tablet 1 Tab  1 Tab Oral DAILY Melani Sapp MD   1 Tab at 21 0945  
• glucose chewable tablet 16 g  4 Tab Oral PRN Melani Sapp MD      
• dextrose (D50W) injection syrg 12.5-25 g  25-50 mL IntraVENous PRN Melani Sapp MD      
• glucagon (GLUCAGEN) injection 1 mg  1 mg IntraMUSCular PRN Melani Spap MD      
• insulin lispro (HUMALOG) injection   SubCUTAneous AC&HS Melani Sapp MD   Stopped at 21 2200  
• sodium chloride (NS) flush 5-40 mL  5-40 mL IntraVENous Q8H Melani Sapp MD   10 mL at 21 0545  
• sodium chloride (NS) flush 5-40 mL  5-40 mL IntraVENous PRN Melani Sapp MD      
• acetaminophen (TYLENOL) tablet 650 mg  650 mg Oral Q6H PRN Melani Sapp MD   650 mg at 21 0014  
 Or  
• acetaminophen (TYLENOL) suppository 650 mg  650 mg Rectal Q6H PRN Melani Sapp MD      
• polyethylene glycol (MIRALAX) packet 17 g  17 g Oral DAILY PRN Melani Sapp MD   17 g at 21 1235  
• heparin (porcine) injection 5,000 Units  5,000 Units SubCUTAneous Q12H Melani Sapp MD   5,000 Units at 21 0545  
  
 
 
No Known Allergies 
 
 
 
Objective:  
 
Blood pressure 110/67, pulse 81, temperature 98.6 °F (37 °C), temperature source Oral, resp. rate 22, height 5' 3\" (1.6 m), weight 87.3 kg (192 lb 7.4 oz), SpO2 94 %. Temp (24hrs), Av.2 °F (36.8 °C), Min:98 °F (36.7 °C), Max:98.6 °F (37 °C) 
 
 
Intake and Output: 
Current Shift: 701 - 1900 
In: 60 [P.O.:60] 
Out: 0  
Last 3 Shifts: 1901 - 700 
In: -  
Out: 285  
 
Physical Exam:  
 
General: Lying in bed comfortably, somewhat tachypneic, improved on BiPAP.  Lethargic, not feeling well. 
Eye: Reactive, symmetric 
Throat and Neck: Supple 
Lung: Reduced air entry  bilaterally with crackles in left upper and midlung zone in both bases. Currently on BiPAP. Heart: S1+S2. No murmurs. tachycardia Abdomen: soft, non-tender. Bowel sounds normal. No masses; obese Extremities: 1-2+ edema lower extremities : Not done Skin: No cyanosis Neurologic: A & O x3 but she is lethargic. Grossly nonfocal 
Psychiatric: Appropriate affect; coherent Lab/Data Review: 
 
Recent Results (from the past 24 hour(s)) GLUCOSE, POC Collection Time: 03/08/21 11:27 AM  
Result Value Ref Range Glucose (POC) 293 (H) 65 - 100 mg/dL Performed by [facility] BEAN   
C REACTIVE PROTEIN, QT Collection Time: 03/08/21  2:56 PM  
Result Value Ref Range C-Reactive protein 19.00 (H) 0.00 - 0.60 mg/dL PROCALCITONIN Collection Time: 03/08/21  2:56 PM  
Result Value Ref Range Procalcitonin 3.75 (H) 0 ng/mL CK Collection Time: 03/08/21  2:56 PM  
Result Value Ref Range CK 42 26 - 192 U/L  
GLUCOSE, POC Collection Time: 03/08/21  4:04 PM  
Result Value Ref Range Glucose (POC) 207 (H) 65 - 100 mg/dL Performed by Connor Rivers, POC Collection Time: 03/08/21  8:10 PM  
Result Value Ref Range Glucose (POC) 141 (H) 65 - 100 mg/dL Performed by Janak Alves   
CBC WITH AUTOMATED DIFF Collection Time: 03/09/21  6:25 AM  
Result Value Ref Range WBC 23.3 (H) 3.6 - 11.0 K/uL  
 RBC 3.20 (L) 3.80 - 5.20 M/uL HGB 9.4 (L) 11.5 - 16.0 g/dL HCT 29.5 (L) 35.0 - 47.0 % MCV 92.2 80.0 - 99.0 FL  
 MCH 29.4 26.0 - 34.0 PG  
 MCHC 31.9 30.0 - 36.5 g/dL  
 RDW 15.5 (H) 11.5 - 14.5 % PLATELET 946 859 - 122 K/uL MPV 11.9 8.9 - 12.9 FL  
 NRBC 0.0 0  WBC ABSOLUTE NRBC 0.00 0.00 - 0.01 K/uL NEUTROPHILS 85 (H) 32 - 75 % LYMPHOCYTES 8 (L) 12 - 49 % MONOCYTES 5 5 - 13 % EOSINOPHILS 1 0 - 7 % BASOPHILS 0 0 - 1 % IMMATURE GRANULOCYTES 1 (H) 0.0 - 0.5 % ABS. NEUTROPHILS 19.9 (H) 1.8 - 8.0 K/UL  
 ABS.  LYMPHOCYTES 2.0 0.8 - 3.5 K/UL ABS. MONOCYTES 1.2 (H) 0.0 - 1.0 K/UL  
 ABS. EOSINOPHILS 0.2 0.0 - 0.4 K/UL  
 ABS. BASOPHILS 0.1 0.0 - 0.1 K/UL  
 ABS. IMM. GRANS. 0.2 (H) 0.00 - 0.04 K/UL  
 DF AUTOMATED    
BNP Collection Time: 03/09/21  6:25 AM  
Result Value Ref Range NT pro-BNP 1,408 (H) <125 pg/mL RENAL FUNCTION PANEL Collection Time: 03/09/21  6:25 AM  
Result Value Ref Range Sodium 130 (L) 136 - 145 mmol/L Potassium 4.6 3.5 - 5.1 mmol/L Chloride 95 (L) 97 - 108 mmol/L  
 CO2 23 21 - 32 mmol/L Anion gap 12 5 - 15 mmol/L Glucose 245 (H) 65 - 100 mg/dL BUN 53 (H) 6 - 20 mg/dL Creatinine 6.69 (H) 0.55 - 1.02 mg/dL BUN/Creatinine ratio 8 (L) 12 - 20 GFR est AA 7 (L) >60 ml/min/1.73m2 GFR est non-AA 6 (L) >60 ml/min/1.73m2 Calcium 8.5 8.5 - 10.1 mg/dL Phosphorus 5.6 (H) 2.6 - 4.7 mg/dL Albumin 2.0 (L) 3.5 - 5.0 g/dL C REACTIVE PROTEIN, QT Collection Time: 03/09/21  6:25 AM  
Result Value Ref Range C-Reactive protein 16.60 (H) 0.00 - 0.60 mg/dL PROCALCITONIN Collection Time: 03/09/21  6:25 AM  
Result Value Ref Range Procalcitonin 3.66 (H) 0 ng/mL GLUCOSE, POC Collection Time: 03/09/21  7:35 AM  
Result Value Ref Range Glucose (POC) 292 (H) 65 - 100 mg/dL Performed by Ralph Harrell, POC Collection Time: 03/09/21  9:11 AM  
Result Value Ref Range Glucose (POC) 265 (H) 65 - 100 mg/dL Performed by Roni Montoya, POC Collection Time: 03/09/21 11:06 AM  
Result Value Ref Range Glucose (POC) 255 (H) 65 - 100 mg/dL Performed by Magdalene Hart   
 
 
XR CHEST PORT Final Result XR CHEST PORT Final Result CT CHEST WO CONT Final Result Left upper lobe pneumonia. Small left pleural effusion. Bilateral  
groundglass airspace opacities, edema versus pneumonia. Atherosclerosis. Heavily  
calcified mitral annulus. XR CHEST PORT Final Result New findings of confluent airspace consolidation involving most of  
the left hemithorax, relatively sparing the left lung base, radiographic  
appearance suspicious for pneumonia, but there does appear to be background  
bilateral interstitial pulmonary edema with nodular groundglass opacity in the  
right upper hemithorax possibly representing asymmetric edema (versus  
pneumonia). Calcified aortic knob. Prominently calcified mitral annulus. Stable  
enlargement of cardiopericardial silhouette. Sequelae of laparoscopic gastric  
banding. XR ABD (KUB) Final Result Sequelae of laparoscopic gastric banding. Mild gaseous distention of  
the stomach. No evidence of intestinal obstruction. Colonic stool. Arterial  
calcification. XR CHEST PORT Final Result No acute abnormality seen. CT Results  (Last 48 hours) 03/07/21 1541  CT CHEST WO CONT Final result Impression:  Left upper lobe pneumonia. Small left pleural effusion. Bilateral  
groundglass airspace opacities, edema versus pneumonia. Atherosclerosis. Heavily  
calcified mitral annulus. Narrative:  Dose Reduction: All CT scans at this facility are performed using dose reduction optimization  
techniques as appropriate to a performed exam including the following: Automated  
exposure control, adjustments of the mA and/or kV according to patient size, or  
use of iterative reconstruction technique. Findings: Unenhanced images were obtained. Motion artifacts are present. Small  
left pleural effusion. Heavily calcified mitral annulus. Bilateral coronary  
artery calcification. Calcified aorta with heavily calcified and stented  
proximal left subclavian artery. Patchy groundglass opacities on the right,  
largely perihilar, suspicious for edema. More prominent groundglass opacities  
are seen in the left lower lobe, edema versus pneumonia, and there is airspace  
consolidation with air bronchograms in the left upper lobe (sparing the inferior  
lingula), consistent with pneumonia. Images through the lung bases and upper  
abdomen degraded by motion artifact. Sequelae of laparoscopic gastric banding. Left upper extremity stent and probable dialysis fistula. Assessment: 1. Acute respiratory failure with hypoxia. 2.  Bilateral pneumonia. Left upper lobe appears to be aspiration with dense pneumonia. 3.  Acute pulmonary edema. 4.  Left pleural effusion. 5.  Sepsis. 6. MRSA bacteremia. 7.  End-stage renal disease on hemodialysis. 8.  Hypertension. 9.  Constipation. 10.  Hyperglycemia Plan:  
 
Agree with transition to ICU level of care today as she has had 2 episodes in a row of not being able to tolerate hemodialysis. Now on BiPAP therapy, will recheck an ABG now to see if we can transition her to a mid flow nasal cannula this afternoon. No indication for intubation at this time, but I suspect that she may be heading that way if her respiratory status does not continue to improve. Goal O2 sats greater than 90%. Patient's respiratory failure appears to be a combination of underlying bilateral pulmonary edema and pleural effusion as well as dense, likely MRSA pneumonia. Currently on broad-spectrum antibiotics per ID, currently on IV ceftaroline/daptomycin/Flagyl. Surveillance blood cultures still positive from 3/6/2021, repeat blood cultures from yesterday/today still pending. CRP/procalcitonin/leukocytosis mildly improving today. Sputum culture unable to be obtained thus far. Would be glad to perform a bronchoscopy with BAL if her respiratory status significantly improves or if she becomes intubated. Unfortunately, she is too unstable to be able to safely perform bronchoscopy at this time. Appreciate ID input. Will await ID recommendations for any changes Continue on scheduled Mucomyst nebs every 6 hours, duo nebs every 6 hours, guaifenesin ER 1200 mg p.o. twice daily, as well as chest physiotherapy every 6 hours.  
 
There also appears to be an element of pulmonary edema/volume overload Unfortunately, the patient did not tolerate much in terms of hemodialysis yesterday or today. Thankfully, her hyperkalemia has improved today. I discussed the case with nephrology, they will plan to place a temporary HD catheter tomorrow and reattempt dialysis in the ICU. Appreciate nephrology input. BNP level is elevated at 1408. Bowel regimen for constipation DVT and GI prophylaxis Tenuous clinical status. Prognosis guarded Questions of patient were answered at bedside in detail Case discussed in detail with RN, RT, and care team 
Thank you for involving me in the care of this patient I will follow with you closely during hospitalization Time spent more than 50 minutes in direct patient care with no overlap reviewing results and records, decision making, and answering questions. Rebecca Barahona,  
[facility] 3/9/2021 
4:33 PM

## 2021-03-09 NOTE — PROGRESS NOTES
Bedside shift change report given to COLEMAN ORTIZ (oncoming nurse) by Rowena Bray RN (offgoing nurse). Report included the following information SBAR, Procedure Summary, Intake/Output, MAR and Recent Results.

## 2021-03-09 NOTE — PROGRESS NOTES
Visit attempted for patient in Dialysis unit for Code RRT. Staff were providing care for the patient. I provided silent support and prayer along with staff support. No family members were present. Chaplains will follow up if further referrals are requested. Chaplain Nicholas Devlin M.Div.  can be reached by calling the  at [facility] 
[phone]

## 2021-03-09 NOTE — PROGRESS NOTES
Tx initiated via a patent LUAG. Access is noted to be warm to touch. Clot aspiration noted with arterial and venous stick. Patient complains of weakness and SOB. Patient was brought to this writer on 6lL of O2 via NC 
0900 Dr. Koko Viramontes called to unit to assess patient. 1039 Dr. Koko Viramontes at bedside assessing patient. Respirations noted to be labored. 0612. Patient complained of feeling hot and weak. /67, BS= 245, Non -re breather applied Rapid Response Called. Tx ended and was transferred to ICU bed 261 
1230pm. 
Went to ICU to remove fistula needles. Mild pressure applied and hemostasis was acheived

## 2021-03-09 NOTE — PROGRESS NOTES
Progress Note Patient: Jorge Castle MRN: [id number]  SSN: [id number] YOB: 1955  Age: 77 y.o. Sex: female Admit Date: 2/24/2021 LOS: 12 days Subjective:  
Patient followed for sepsis with persistent MRSA bacteremia currently on Daptomycin and Ceftaroline after last set of blood cultures turned positive. Over the weekend she developed respiratory difficulty with dense infiltrate in her left lung felt to be due to aspiration pneumonia. Flagyl was added for this reason. Patient affirms SOB and productive cough. Spoke with General Surgery regarding removal of her AV fistula. She is afebrile today but her WBC has trended upward. Procal decreasing but CRP increasing. Pulmonary is following. Objective:  
 
Vitals:  
 03/08/21 1400 03/08/21 1606 03/08/21 2004 03/08/21 2032 BP: 119/62 139/64 (!) 105/53 Pulse: 79 85 75 Resp:  16 20 Temp:  98 °F (36.7 °C) 98.3 °F (36.8 °C) TempSrc:      
SpO2: 94% 98% 95% 95% Weight:      
Height:      
  
 
Intake and Output: 
Current Shift: No intake/output data recorded. Last three shifts: 03/07 0701 - 03/08 1900 In: -  
Out: 287 Physical Exam:  
 Constitutional:   
   General: She is not in acute distress. Appearance: She is obese. She is ill-appearing. HENT: Nasal O2 6L/min Head: Normocephalic and atraumatic. Nose: Nose normal.  
   Mouth/Throat:  
   Pharynx: Oropharynx is clear. Eyes:  
   Pupils: Pupils are equal, round, and reactive to light. Cardiovascular:  
   Rate and Rhythm: Normal rate and regular rhythm. Heart sounds: No murmur. Pulmonary:  
   Breath sounds: No wheezing, rhonchi or rales. Genitourinary: 
   Comments: No Ann Musculoskeletal:  
   Right lower leg: No edema. Left lower leg: No edema. Comments: Left upper arm graft without tenderness or erythema, no drainage Skin: 
   Findings: No rash. Neurological:  
   General: No focal deficit present. Mental Status: She is alert and oriented to person, place, and time. Psychiatric:     
   Mood and Affect: Mood normal.     
   Behavior: Behavior normal.     
   Thought Content: Thought content normal.     
   Judgment: Judgment normal. 
 
Lab/Data Review: WBC 26,400 Procalcitonin 3.75 <6.32 <11.66 <17.02 < 41.16 < 128.41 <180.97  
CRP 19.00 <14.80 <18.30 <21.70 <15.60 <17.60 <16.60 Blood cultures (2/24) MRSA Blood cultures (2/25) MRSA Blood cultures (2/26) MRSA Blood cultures (2/27) MRSA Blood cultures (3/1) MRSA Blood cultures (3/3) No growth 1 day Blood cultures (3/4) Possible GPC in clusters TTE (2/28)  Tricuspid valve with possible echogenic mobile linear densities. SAYDA (3/4) No echocardiographic evidence of valvular vegetation or endocarditis appreciated. CXR (3/8)  Dense consolidation throughout much of the left lung and mild patchy airspace 
disease on the right, all accentuated by rotation and technique. Assessment:  
 
Active Problems: 
  ESRD on dialysis ([facility] [address] (2/24/2021) Sepsis ([facility] [address] (2/24/2021) 
 
  
1. Sepsis with fever, leukocytosis and elevated lactic acid, procalcitonin and CRP 2. Persistent MRSA bacteremia, Day #13 IV Antibiotics, now Daptomycin and Ceftaroline, SAYDA negative for endocarditis 3. Presumptive infected AV graft left arm, secondary to above 4. ?HCAP, left lung 5. ESRD on HD 6. Covid-19 negative Comment:  SAYDA negative for endocarditis but last blood cultures still with MRSA. WBC and CRP increasing but procal decreasing. It seems strange that this patient who has been alert and oriented, would develop aspiration pneumonia. With respect to AV fistula, I would prefer to see if she fails Daptomycin/Ceftaroline before removal, but would defer to Nephrology. Plan: 1. Continue Daptomycin 8 mg/kg IV along with Ceftaroline 2. Continue Flagyl 3. In am, repeat blood cultures, CBC, procal and CRP 4. Sputum cultures 5.  Follow-up pending blood cultures Signed By: Ria Reyna MD   
 March 8, 2021

## 2021-03-09 NOTE — PROGRESS NOTES
TRANSFER - OUT REPORT: 
 
Verbal report given to COLEMAN Jay(name) on Harrison Sheets  being transferred to ICU (unit) for change in patient condition(respiratory distress) Report consisted of patients Situation, Background, Assessment and  
Recommendations(SBAR). Information from the following report(s) SBAR, Procedure Summary, MAR and Recent Results was reviewed with the receiving nurse. Lines:  
Venous Access Device AV Fistula Graft  Arm, left (Active) Central Line Being Utilized Yes 03/08/21 9697 Criteria for Appropriate Use Dialysis/apheresis 03/08/21 0515 Site Assessment Clean, dry, & intact 03/08/21 0515 Dressing Status Clean, dry, & intact 03/08/21 0515 Dressing Type Tape 03/08/21 0515 Access Medial Site Assessment Bruit heard; Thrill felt 03/08/21 0515 Peripheral IV 03/07/21 Inner; Lower;Right Forearm (Active) Site Assessment Clean, dry, & intact 03/08/21 0057 Phlebitis Assessment 0 03/08/21 0057 Infiltration Assessment 0 03/08/21 0057 Dressing Status Clean, dry, & intact 03/08/21 0057 Dressing Type Tape;Transparent 03/08/21 0057 Hub Color/Line Status Pink;Flushed;Patent 03/08/21 0057 Alcohol Cap Used Yes 03/08/21 0057 Saline Lock Anterior;Proximal;Right Antecubital (Active) Site Assessment Clean, dry, & intact; Clean;Dry; Intact 03/07/21 1636 Phlebitis Assessment 0 03/07/21 1636 Infiltration Assessment 0 03/07/21 1636 Dressing Status Clean, dry, & intact; Clean;Dry; Intact 03/07/21 1636 Dressing Type Transparent 03/07/21 1636 Hub Color/Line Status Pink 03/07/21 1636 Opportunity for questions and clarification was provided.    
 
Patient transported with: 
 transported from dialysis to ICU via rapid response team

## 2021-03-09 NOTE — PROGRESS NOTES
Dr. Yaneth Agosto provided with contact information for patients daughter so that she may be updated on change in patient condition.

## 2021-03-09 NOTE — PROGRESS NOTES
Patient currently on PT caseload, however [facility]  due to medical decline. Will need new order for PT eval once patient medically stable for evaluation. Thanks you.

## 2021-03-09 NOTE — PROGRESS NOTES
Renal progress Note NAME:  Gonzalo Fuller :   1955 MRN:   [id number] Date/Time:  3/9/2021 1:08 PM 
 
 
Subjective:  
Patient seen on HD,  tachypneic. Not feeling well C/o sob No edema, stable BPs Objective: VITALS:   
Visit Vitals /67 Pulse 81 Temp 97.7 °F (36.5 °C) Resp 22 Ht 5' 3\" (1.6 m) Wt 87.3 kg (192 lb 7.4 oz) SpO2 94% BMI 34.09 kg/m² Temp (24hrs), Av.1 °F (36.7 °C), Min:97.7 °F (36.5 °C), Max:98.6 °F (37 °C) PHYSICAL EXAM:  
General: alert awake, tachpnic HEENT: EOMI, no Icterus, no Pallor, pupils reactive, Neck: Neck is supple, No JVD, no thyromegaly,  
Lungs:decreased BS left base,, few rales+ CVS: heart sounds normal, regular rate and rhythm, no murmurs, no rubs. GI: soft, nontender, normal BS, Extremeties: no cyanosis, no edema, Neuro: Alert, awake, speech is normal ,  
Skin: normal skin turgor, no skin rashes LAB DATA REVIEWED:   
Recent Results (from the past 24 hour(s)) C REACTIVE PROTEIN, QT Collection Time: 21  2:56 PM  
Result Value Ref Range C-Reactive protein 19.00 (H) 0.00 - 0.60 mg/dL PROCALCITONIN Collection Time: 21  2:56 PM  
Result Value Ref Range Procalcitonin 3.75 (H) 0 ng/mL CK Collection Time: 21  2:56 PM  
Result Value Ref Range CK 42 26 - 192 U/L  
GLUCOSE, POC Collection Time: 21  4:04 PM  
Result Value Ref Range Glucose (POC) 207 (H) 65 - 100 mg/dL Performed by Stef Hayes, POC Collection Time: 21  8:10 PM  
Result Value Ref Range Glucose (POC) 141 (H) 65 - 100 mg/dL Performed by Jose Leong   
CBC WITH AUTOMATED DIFF Collection Time: 03/09/21  6:25 AM  
Result Value Ref Range WBC 23.3 (H) 3.6 - 11.0 K/uL  
 RBC 3.20 (L) 3.80 - 5.20 M/uL HGB 9.4 (L) 11.5 - 16.0 g/dL HCT 29.5 (L) 35.0 - 47.0 % MCV 92.2 80.0 - 99.0 FL  
 MCH 29.4 26.0 - 34.0 PG  
 MCHC 31.9 30.0 - 36.5 g/dL  
 RDW 15.5 (H) 11.5 - 14.5 % PLATELET 481 662 - 160 K/uL MPV 11.9 8.9 - 12.9 FL  
 NRBC 0.0 0  WBC ABSOLUTE NRBC 0.00 0.00 - 0.01 K/uL NEUTROPHILS 85 (H) 32 - 75 % LYMPHOCYTES 8 (L) 12 - 49 % MONOCYTES 5 5 - 13 % EOSINOPHILS 1 0 - 7 % BASOPHILS 0 0 - 1 % IMMATURE GRANULOCYTES 1 (H) 0.0 - 0.5 % ABS. NEUTROPHILS 19.9 (H) 1.8 - 8.0 K/UL  
 ABS. LYMPHOCYTES 2.0 0.8 - 3.5 K/UL  
 ABS. MONOCYTES 1.2 (H) 0.0 - 1.0 K/UL  
 ABS. EOSINOPHILS 0.2 0.0 - 0.4 K/UL  
 ABS. BASOPHILS 0.1 0.0 - 0.1 K/UL  
 ABS. IMM. GRANS. 0.2 (H) 0.00 - 0.04 K/UL  
 DF AUTOMATED    
BNP Collection Time: 03/09/21  6:25 AM  
Result Value Ref Range NT pro-BNP 1,408 (H) <125 pg/mL RENAL FUNCTION PANEL Collection Time: 03/09/21  6:25 AM  
Result Value Ref Range Sodium 130 (L) 136 - 145 mmol/L Potassium 4.6 3.5 - 5.1 mmol/L Chloride 95 (L) 97 - 108 mmol/L  
 CO2 23 21 - 32 mmol/L Anion gap 12 5 - 15 mmol/L Glucose 245 (H) 65 - 100 mg/dL BUN 53 (H) 6 - 20 mg/dL Creatinine 6.69 (H) 0.55 - 1.02 mg/dL BUN/Creatinine ratio 8 (L) 12 - 20 GFR est AA 7 (L) >60 ml/min/1.73m2 GFR est non-AA 6 (L) >60 ml/min/1.73m2 Calcium 8.5 8.5 - 10.1 mg/dL Phosphorus 5.6 (H) 2.6 - 4.7 mg/dL Albumin 2.0 (L) 3.5 - 5.0 g/dL C REACTIVE PROTEIN, QT Collection Time: 03/09/21  6:25 AM  
Result Value Ref Range C-Reactive protein 16.60 (H) 0.00 - 0.60 mg/dL PROCALCITONIN Collection Time: 03/09/21  6:25 AM  
Result Value Ref Range Procalcitonin 3.66 (H) 0 ng/mL GLUCOSE, POC Collection Time: 03/09/21  7:35 AM  
Result Value Ref Range Glucose (POC) 292 (H) 65 - 100 mg/dL Performed by Sammy Butt, POC Collection Time: 03/09/21  9:11 AM  
Result Value Ref Range Glucose (POC) 265 (H) 65 - 100 mg/dL Performed by Jackie Gillette, POC Collection Time: 03/09/21 11:06 AM  
Result Value Ref Range Glucose (POC) 255 (H) 65 - 100 mg/dL  Performed by Regla Marti   
BLOOD GAS, ARTERIAL Collection Time: 03/09/21 11:44 AM  
Result Value Ref Range pH 7.45 7.35 - 7.45    
 PCO2 33 (L) 35 - 45 mmHg PO2 57 (L) 75 - 100 mmHg O2 SAT 90 (L) >95 % BICARBONATE 24 22 - 26 mmol/L  
 BASE DEFICIT 0.5 0 - 2 mmol/L  
 O2 METHOD BiPAP    
 FIO2 50.0 % SET RATE 14    
 EPAP/CPAP/PEEP 7 SITE Right Radial    
 ARLET'S TEST PASS Recommendations/Plan:  
#1 ESRD: on HD at [facility] every TTS 
--No uremia, SOB is probably related to her pneumonia, clinically no evidence of fluid overload, no peripheral edema Continue antibiotics as per ID Hemodialysis was discontinued as patient became more distressed and short of breath and transferred to the intensive care unit 2. Hyperkalemia: Secondary to end-stage renal disease Continue renal diet Resolved with 1 dose of Kayexalate 30 g p.o. yesterday  
will repeat hemodialysis tomorrow #3 renal osteodystrophy 
-Not noted to be on any phosphorus binders. will check phosphorus with next labs. Obtain PTH from outpatient and continue Zemplar if indicated 
-Calcium is okay #3 anemia acute on chronic 
-hemoglobin yesterday was 6.9, -She received blood and repeat Hb is 8.4 
-Continue Procrit with dialysis #4 sepsis: Persistent MRSA bacteremia ID following, continue IV antibiotics as per ID Persistent bacteremia: multiple BCs since 2/25 are postive, lateest from 3/6 Highly suspicious for AVG infection Patient had previous episode of infected AV graft requiring removal 
She may need graft removal again ,  
Discussed with Dr. Alyson Guerra to evaluate for AVG removal.  
Recommend to avoid AV graft use for now as patient is not tolerating hemodialysis possibly from bacteremia We will get a triple-lumen temporary hemodialysis catheter for hemodialysis and also to maintain IV access Will Plan for repeat hemodialysis tomorrow #5 hypertension 
-amlodipine and hydralazine were discontinued #6 hyponatremia 
-Sodium stable at 130 
-Will plan dialysis with high sodium bath. -Restrict fluid intake 7. Acute hypoxic respiratory failure: Placed on BiPAP and transferred to the intensive care unit Left lower lobe dense infiltrate present, continue antibiotics Discussed with pulmonary 
 
 
________________________________________________________________________ Signed: Mala Cardona MD

## 2021-03-09 NOTE — PROGRESS NOTES
She is in the ICU but looks better now than yesterday to me. Dr. Domingo Vasquez wants to hold off on graft removal for now. With her pneumonia she is certainly a high operative risk now.

## 2021-03-09 NOTE — PROGRESS NOTES
Hospitalist Progress Note NAME: Bridget An :  1955 MRN:  [id number] Subjective:  
Patient seen in ICU, case discussed with nursing staff, spoke with daughter on phone explained she been moved to ICU and answered most of her questions at this time. Per nursing staff patient has difficult IV access and I spoke to interventional radiology to try to central line. When she noted she had a rapid response during dialysis and was moved to ICU placed on BiPAP. Patient is alert but unable to communicate as she is on BiPAP. No other acute reported by the ICU nursing staff at this time. Patient was seen by pulmonary, ID and nephrology prior to me Objective: VITALS:  
Last 24hrs VS reviewed since prior progress note. Most recent are: 
Patient Vitals for the past 24 hrs: 
 Temp Pulse Resp BP SpO2  
21 1100 97.7 °F (36.5 °C)      
21 0923     94 % 21 0913  81  110/67   
21 0845 98.6 °F (37 °C) 86  121/70   
21 0740 98 °F (36.7 °C) 85 22 (!) 101/54 92 % 21 0648     91 % 21 0100     94 % 21 2032     95 % 21 2004 98.3 °F (36.8 °C) 75 20 (!) 105/53 95 % 21 1606 98 °F (36.7 °C) 85 16 139/64 98 % 21 1400  79  119/62 94 % Intake/Output Summary (Last 24 hours) at 3/9/2021 1249 Last data filed at 3/9/2021 5921 Gross per 24 hour Intake 60 ml Output 0 ml Net 60 ml PHYSICAL EXAM: 
General: On bipap Resp:  Needing Bipap CV:  Regular  rhythm,  No edema GI:  Soft, Non distended, Non tender. Neurologic:  Alert and follows commands Psych:    Not anxious nor agitated Skin:    lue avf. Procedures: see electronic medical records for all procedures/Xrays and details which were not copied into this note but were reviewed prior to creation of Plan. LABS: 
I reviewed today's most current labs and imaging studies.  
Pertinent labs include: 
Recent Results (from the past 12 hour(s)) CBC WITH AUTOMATED DIFF Collection Time: 03/09/21  6:25 AM  
Result Value Ref Range WBC 23.3 (H) 3.6 - 11.0 K/uL  
 RBC 3.20 (L) 3.80 - 5.20 M/uL HGB 9.4 (L) 11.5 - 16.0 g/dL HCT 29.5 (L) 35.0 - 47.0 % MCV 92.2 80.0 - 99.0 FL  
 MCH 29.4 26.0 - 34.0 PG  
 MCHC 31.9 30.0 - 36.5 g/dL  
 RDW 15.5 (H) 11.5 - 14.5 % PLATELET 750 702 - 495 K/uL MPV 11.9 8.9 - 12.9 FL  
 NRBC 0.0 0  WBC ABSOLUTE NRBC 0.00 0.00 - 0.01 K/uL NEUTROPHILS 85 (H) 32 - 75 % LYMPHOCYTES 8 (L) 12 - 49 % MONOCYTES 5 5 - 13 % EOSINOPHILS 1 0 - 7 % BASOPHILS 0 0 - 1 % IMMATURE GRANULOCYTES 1 (H) 0.0 - 0.5 % ABS. NEUTROPHILS 19.9 (H) 1.8 - 8.0 K/UL  
 ABS. LYMPHOCYTES 2.0 0.8 - 3.5 K/UL  
 ABS. MONOCYTES 1.2 (H) 0.0 - 1.0 K/UL  
 ABS. EOSINOPHILS 0.2 0.0 - 0.4 K/UL  
 ABS. BASOPHILS 0.1 0.0 - 0.1 K/UL  
 ABS. IMM. GRANS. 0.2 (H) 0.00 - 0.04 K/UL  
 DF AUTOMATED    
BNP Collection Time: 03/09/21  6:25 AM  
Result Value Ref Range NT pro-BNP 1,408 (H) <125 pg/mL RENAL FUNCTION PANEL Collection Time: 03/09/21  6:25 AM  
Result Value Ref Range Sodium 130 (L) 136 - 145 mmol/L Potassium 4.6 3.5 - 5.1 mmol/L Chloride 95 (L) 97 - 108 mmol/L  
 CO2 23 21 - 32 mmol/L Anion gap 12 5 - 15 mmol/L Glucose 245 (H) 65 - 100 mg/dL BUN 53 (H) 6 - 20 mg/dL Creatinine 6.69 (H) 0.55 - 1.02 mg/dL BUN/Creatinine ratio 8 (L) 12 - 20 GFR est AA 7 (L) >60 ml/min/1.73m2 GFR est non-AA 6 (L) >60 ml/min/1.73m2 Calcium 8.5 8.5 - 10.1 mg/dL Phosphorus 5.6 (H) 2.6 - 4.7 mg/dL Albumin 2.0 (L) 3.5 - 5.0 g/dL C REACTIVE PROTEIN, QT Collection Time: 03/09/21  6:25 AM  
Result Value Ref Range C-Reactive protein 16.60 (H) 0.00 - 0.60 mg/dL PROCALCITONIN Collection Time: 03/09/21  6:25 AM  
Result Value Ref Range Procalcitonin 3.66 (H) 0 ng/mL GLUCOSE, POC Collection Time: 03/09/21  7:35 AM  
Result Value Ref Range Glucose (POC) 292 (H) 65 - 100 mg/dL Performed by Russ Campbell, POC Collection Time: 03/09/21  9:11 AM  
Result Value Ref Range Glucose (POC) 265 (H) 65 - 100 mg/dL Performed by Christy Hedrick, POC Collection Time: 03/09/21 11:06 AM  
Result Value Ref Range Glucose (POC) 255 (H) 65 - 100 mg/dL Performed by Vamshi Brown   
BLOOD GAS, ARTERIAL Collection Time: 03/09/21 11:44 AM  
Result Value Ref Range pH 7.45 7.35 - 7.45    
 PCO2 33 (L) 35 - 45 mmHg PO2 57 (L) 75 - 100 mmHg O2 SAT 90 (L) >95 % BICARBONATE 24 22 - 26 mmol/L  
 BASE DEFICIT 0.5 0 - 2 mmol/L  
 O2 METHOD BiPAP    
 FIO2 50.0 % SET RATE 14    
 EPAP/CPAP/PEEP 7 SITE Right Radial    
 ARLET'S TEST PASS Reviewed most current lab test results and cultures  YES Reviewed most current radiology test results   YES Review and summation of old records today    NO Reviewed patient's current orders and MAR    YES 
PMH/SH reviewed - no change compared to H&P 
______________________________________ Assessment/Plan: 
 
Sepsis 2/2 mrsa bacteremia Persistent mrsa bacteremia, presumptive infected av graft left arm. Similar presentation 1 yearago, left arm av graft had to be removed to control bacteremia, could not be completely removed without compromising limb reportedly. Graft was replaced 9/2020. Left arm avg will likely need to be removed before blcs can be sterilized- ID appreciated. Latest blcs 3/4/21 reporting mrsa x 2, 3/6 again growing gpc Vanco stopped in favor of Daptomycin, ceftaroline continued. (lipitor held 2/2 interaction with dapto)-ck 42. SAYDA 3/4/21, no veggies Acute resp failure with hypoxia Bilateral pna- william appears to be aspiration with dense pna 
maintrain sao2>90%,cont monitor in  icu on bipap Underlying pulmonary edema contributing likely as well. Continues ceftaroline/daptomycin/flagyl Pulm on case defer to them 
  
End-stage renal disease on hemodialysis 
patient has a history of end-stage renal disease on hemodialysis with concerns of clotting of fistula Nephrology following. HD  In AM per renal after dialysis access issues resolved. 
  
Hyperglycemia due to type 2 diabetesA1c 8. 6. 
continue ssi/hypoglycemia protocol. Remains above goal, titrate to pta lantus 14 units every day. 
 
  
Hypertension hold home emds as  bP low normal. 
  
Hyperlipidemia Statin held while on daptomycin. Ck 42. AOCD:    monitor hgb >8 ProphylaxisHeparin subcu Full code, NOK daughter [id number] updated 3/9/21 Disposition-  TBD 
________________________________________________________________________ Shamir Hussein MD

## 2021-03-09 NOTE — PROGRESS NOTES
Progress Note Patient: Remberto Arciniega MRN: [id number]  SSN: [id number] YOB: 1955  Age: 77 y.o. Sex: female Admit Date: 2/24/2021 LOS: 13 days Subjective:  
Patient followed for sepsis with persistent MRSA bacteremia currently on Daptomycin and Ceftaroline after last set of blood cultures turned positive. Repeat blood cultures pending. Now with suspected HCAP primarily left lung. Sputum culture ordered. She is afebrile with decreasing WBC, procal and CRP. She has been transferred to the ICU following increased respiratory difficulty and rapid response. She is now on BIPAP. Objective:  
 
Vitals:  
 03/08/21 2032 03/09/21 0100 03/09/21 8720 03/09/21 0740 BP:    (!) 101/54 Pulse:    85 Resp:    22 Temp:    98 °F (36.7 °C) TempSrc:      
SpO2: 95% 94% 91% 92% Weight:      
Height:      
  
 
Intake and Output: 
Current Shift: 03/09 0701 - 03/09 1900 In: 61 [P.O.:60] Out: - Last three shifts: 03/07 1901 - 03/09 0700 In: -  
Out: 285 Physical Exam:  
 Constitutional:   
   General: She is not in acute distress. Appearance: She is obese. She is ill-appearing. HENT: BIPAP 50% Head: Normocephalic and atraumatic. Nose: Nose normal.  
   Mouth/Throat:  
   Pharynx: Oropharynx is clear. Eyes:  
   Pupils: Pupils are equal, round, and reactive to light. Cardiovascular:  
   Rate and Rhythm: Normal rate and regular rhythm. Heart sounds: No murmur. Pulmonary:  
   Breath sounds: No wheezing, rhonchi or rales. Genitourinary: 
   Comments: No Ann Musculoskeletal:  
   Right lower leg: No edema. Left lower leg: No edema. Comments: Left upper arm graft without tenderness or erythema, no drainage Skin: 
   Findings: No rash. Neurological:  
   General: No focal deficit present. Mental Status: She is alert and oriented to person, place, and time.   
Psychiatric:     
   Mood and Affect: Mood normal.     
 Behavior: Behavior normal.     
   Thought Content: Thought content normal.     
   Judgment: Judgment normal. 
 
Lab/Data Review: WBC 23,300 Procalcitonin 3.66 < 128.41 <180.97  
CRP 16.60 <19.00  <16.60 Blood cultures (2/24) MRSA Blood cultures (2/25) MRSA Blood cultures (2/26) MRSA Blood cultures (2/27) MRSA Blood cultures (3/1) MRSA Blood cultures (3/3) No growth 5 days Blood cultures (3/4) Possible MRSA Blood cultures (3/8) No growth so far Blood cultures (3/9) Pending 
 
TTE (2/28)  Tricuspid valve with possible echogenic mobile linear densities. SAYDA (3/4) No echocardiographic evidence of valvular vegetation or endocarditis appreciated. CXR (3/8)  Dense consolidation throughout much of the left lung and mild patchy airspace 
disease on the right, all accentuated by rotation and technique. Assessment:  
 
Active Problems: 
  ESRD on dialysis ([facility] [address] (2/24/2021) Sepsis ([facility] [address] (2/24/2021) 
 
  
1. Sepsis with fever, leukocytosis and elevated lactic acid, procalcitonin and CRP 2. Persistent MRSA bacteremia, Day #14 IV Antibiotics, now Day #3 Daptomycin and Ceftaroline, SAYDA negative for endocarditis 3. Presumptive infected AV graft left arm, secondary to above 4. ?HCAP, left lung, on Ceftaroline and Flagyl 5. ESRD on HD 6. Covid-19 negative Comment:  SAYDA negative for endocarditis but last blood cultures still with MRSA. WBC, CRP and procal decreasing today. With respect to AV fistula, I would prefer to see if she fails Daptomycin/Ceftaroline before removal, but would defer to Nephrology. Plan: 1. Continue Daptomycin 8 mg/kg IV along with Ceftaroline 2. Continue Flagyl for suspected aspiration pneumonia 3. In am, repeat CBC, procal and CRP 4. Sputum culture ordered 5. Follow-up repeat blood cultures Signed By: Brynn Manning MD   
 March 9, 2021

## 2021-03-10 NOTE — PROGRESS NOTES
Pt began having conversations with daughter, stating what she would want done if she were to pass away etc. Daughter is concerned about patient's prognosis. Dr. Juju Arshad in to speak with patient and daughter. Patient stated to this nurse, and again while Dr. Juju Arshad was in the room that should something happen she would want to be intubated and would want CPR.

## 2021-03-10 NOTE — PROGRESS NOTES
Comprehensive Nutrition Assessment Type and Reason for Visit: Reassess(initial, poor PO) Nutrition Recommendations/Plan:  
Continue Renal diet Add Nepro TID Add Ensure Pdg TID Document all PO intakes in EMR Nutrition Assessment:  Admitted for sepsis, MRSA - on vancomycin. ESRD on HD TTS. S/p temp HD cath placement with HD daily. Rapid response yesterday, transferred to ICU. Per MD notes, pt needs graft removal and possible bronch- currently too unstable. PO intake of 30% noted 3/5. Spoke with RN today, who reports no PO intakes today, as unable to remove BiPAP. RD unable to speak with pt for same. RD to order ONS. Previously following as LOS; pt reported consuming 50% of meals with fair appetite thru admit on last assessment. Labs and meds reviewed, BG regularly elevated >150 mg/dl. Malnutrition Assessment: 
Malnutrition Status:  Mild malnutrition Context:  Acute illness Findings of the 6 clinical characteristics of malnutrition:  
Energy Intake:  7 - 50% or less of est energy requirements for 5 or more days Weight Loss:  No significant weight loss Body Fat Loss:  No significant body fat loss, Muscle Mass Loss:  No significant muscle mass loss, Fluid Accumulation:  No significant fluid accumulation,   
 
Nutrition Related Findings:  Appears well-nourished, visibly in pain during visit 3/2. NFPE not performed at this time. Denies c/s difficulties. Previous assessment, report N/V- last emesis 3/2 per pt, 2/26 per EMR. Previously endorsed constipation; last BM 3/08, Soft. Wounds:   
None Current Nutrition Therapies: DIET RENAL Regular; Low Phosphorus, Low Potassium Anthropometric Measures: 
· Height:  5' 3\" (160 cm) · Current Body Wt:  91.1 kg (200 lb 13.4 oz)(3/10) · Usual Body Wt:  91.6 kg (202 lb) · Ideal Body Wt:  115 lbs:  174.6 % · BMI Category:  Obese class 1 (BMI 30.0-34. 9) Wt Readings from Last 3 Encounters:  
03/10/21 91.1 kg (200 lb 13.4 oz) 09/02/20 94.5 kg (208 lb 5.4 oz) 06/24/20 94.1 kg (207 lb 7.3 oz) Nutrition Diagnosis:  
· Inadequate oral intake related to impaired nutrient utilization as evidenced by intake 0-25%(SOB off biPAP) Nutrition Interventions:  
Food and/or Nutrient Delivery: Continue current diet, Start oral nutrition supplement Nutrition Education and Counseling: No recommendations at this time Coordination of Nutrition Care: Continue to monitor while inpatient Goals: 
Meet >50% EENs in 5-7 days, Wt maintenance +/- 0.5kg per week, Lytes wnl Nutrition Monitoring and Evaluation:  
Behavioral-Environmental Outcomes: None identified Food/Nutrient Intake Outcomes: Food and nutrient intake, Supplement intake Physical Signs/Symptoms Outcomes: Weight, Nausea/vomiting, Constipation Discharge Planning:   
No discharge needs at this time Electronically signed by Deann Segovia on 3/10/2021 at 11:50 AM 
 
Contact:

## 2021-03-10 NOTE — PROGRESS NOTES
Pulmonary and Critical Care Progress Note Subjective:  
 
Patient seen and examined in her room in the ICU this morning, no acute events overnight. She tolerated nasal cannula for much of the day yesterday, but requested to go back on to BiPAP due to tachypnea and some mild hypoxia this morning. She underwent a Trialysis placement this morning as well and she is currently undergoing HD without issues. Even be able to remove some fluid today Patient has not had any fevers in the past 48 hours. Leukocytosis improving (WBC 23-->20). Blood cultures from 3/8/2021 have been flagged as 1/2 likely growing GPC's in clusters, repeat cultures from 3/9/2021 no growth. She is getting aggressive pulmonary hygiene for her severe pneumonia. Still unable to produce an adequate sputum sample, and she is too unstable for bronchoscopy unless she were to be intubated. However, at this time she has no acute indication for intubation. She is doing well on BiPAP. The surgical team is following, and I would recommend removal of her AV fistula as this is the most likely etiology of her persistent bacteremia. It would be unlikely that a pneumonia would continue to lead to persistent bacteremia. Review of Systems: A comprehensive review of systems was negative except for that written in the HPI. Current Facility-Administered Medications Medication Dose Route Frequency Provider Last Rate Last Admin  acetylcysteine (MUCOMYST) 200 mg/mL (20 %) solution 400 mg  400 mg Nebulization Q6H RT Johnny Steinberg MD   400 mg at 03/10/21 0703  
 guaiFENesin ER (MUCINEX) tablet 1,200 mg  1,200 mg Oral Q12H Arthur Mchugh DO   1,200 mg at 03/10/21 0859  
 insulin glargine (LANTUS) injection 14 Units  14 Units SubCUTAneous QHS Geronimo García MD   14 Units at 03/09/21 2152  benzonatate (TESSALON) capsule 100 mg  100 mg Oral TID PRN Irasema PATEL MD   100 mg at 03/07/21 2337  DAPTOmycin (CUBICIN) 700 mg in sterile water (preservative free) 14 mL IV syringe RF formulation  8 mg/kg IntraVENous Q48H Maribel Ch MD   700 mg at 03/09/21 1721  
 albuterol-ipratropium (DUO-NEB) 2.5 MG-0.5 MG/3 ML  3 mL Nebulization Q6H RT Louis PATEL MD   3 mL at 03/10/21 7459  hydrOXYzine pamoate (VISTARIL) capsule 50 mg  50 mg Oral TID PRN Fiorella Son MD   50 mg at 03/07/21 2337  
 metroNIDAZOLE (FLAGYL) IVPB premix 500 mg  500 mg IntraVENous Q8H Maribel Ch  mL/hr at 03/10/21 0345 500 mg at 03/10/21 0345  metoclopramide HCl (REGLAN) injection 10 mg  10 mg IntraVENous Q8H PRN Carlos Le MD   10 mg at 03/07/21 1251  prochlorperazine (COMPAZINE) with saline injection 5 mg  5 mg IntraVENous Q6H PRN Carlos Le MD   5 mg at 03/01/21 1730  cefTARoline (TEFLARO) 200 mg in 0.9% sodium chloride 50 mL IVPB  200 mg IntraVENous Q12H Vernon Simons MD 50 mL/hr at 03/09/21 2035 200 mg at 03/09/21 2035  insulin lispro (HUMALOG) injection 4 Units  4 Units SubCUTAneous Melani Taylor Cha, MD   Stopped at 03/10/21 0730  
 sodium chloride (NS) flush 5-10 mL  5-10 mL IntraVENous PRN Carlos Le MD      
 aspirin delayed-release tablet 81 mg  81 mg Oral DAILY Paco Sapp MD   81 mg at 03/10/21 3900  [Held by provider] atenoloL (TENORMIN) tablet 50 mg  50 mg Oral DAILY Paco Sapp MD   Stopped at 03/09/21 0900  clopidogreL (PLAVIX) tablet 75 mg  75 mg Oral DAILY Melani Sapp Cha, MD   75 mg at 03/10/21 8561  loratadine (CLARITIN) tablet 10 mg  10 mg Oral DAILY Carlos Le MD   10 mg at 03/10/21 3311  multivitamin, tx-iron-ca-min (THERA-M w/ IRON) tablet 1 Tab  1 Tab Oral DAILY Carlos Le MD   1 Tab at 03/10/21 0859  
 glucose chewable tablet 16 g  4 Tab Oral PRN Carlos Le MD      
 dextrose (D50W) injection syrg 12.5-25 g  25-50 mL IntraVENous ANIBAL Mcdaniel MD      
 glucagon [facility]) injection 1 mg  1 mg IntraMUSCular PRLENNY Mcdaniel MD      
 insulin lispro (HUMALOG) injection   SubCUTAneous AC&HS Justus Mcdaniel MD   Stopped at 21 2200  
 sodium chloride (NS) flush 5-40 mL  5-40 mL IntraVENous Melani Harris MD   10 mL at 03/10/21 0600  
 sodium chloride (NS) flush 5-40 mL  5-40 mL IntraVENous PRLENNY Mcdaniel MD      
 acetaminophen (TYLENOL) tablet 650 mg  650 mg Oral Q6H PRN Justus Mcdaniel MD   650 mg at 21 2034 Or  acetaminophen (TYLENOL) suppository 650 mg  650 mg Rectal Q6H PRLENNY Mcdaniel MD      
 polyethylene glycol [facility]) packet 17 g  17 g Oral DAILY PRN Justus Mcdaniel MD   17 g at 21 1235  heparin (porcine) injection 5,000 Units  5,000 Units SubCUTAneous Q12H Noel Sapp MD   5,000 Units at 03/10/21 7911 No Known Allergies Objective:  
 
Blood pressure (!) 112/57, pulse 90, temperature 98.3 °F (36.8 °C), resp. rate 23, height 5' 3\" (1.6 m), weight 91.1 kg (200 lb 13.4 oz), SpO2 94 %. Temp (24hrs), Av.9 °F (36.6 °C), Min:97.1 °F (36.2 °C), Max:98.3 °F (36.8 °C) Intake and Output: 
Current Shift: No intake/output data recorded. Last 3 Shifts: 1901 - 03/10 0700 In: 61 [P.O.:60] Out: 0 Physical Exam:  
 
General: Lying in bed comfortably, somewhat tachypneic, improved on BiPAP. Lethargic, not feeling well. Eye: Reactive, symmetric Throat and Neck: Supple Lung: Reduced air entry bilaterally with crackles in left upper and midlung zone in both bases. Currently on BiPAP. Heart: S1+S2. No murmurs. tachycardia Abdomen: soft, non-tender. Bowel sounds normal. No masses; obese Extremities: 1-2+ edema lower extremities : Not done Skin: No cyanosis Neurologic: A & O x3 but she is lethargic.   Grossly nonfocal 
Psychiatric: Appropriate affect; coherent Lab/Data Review: 
 
Recent Results (from the past 24 hour(s)) BLOOD GAS, ARTERIAL Collection Time: 03/09/21 11:44 AM  
Result Value Ref Range pH 7.45 7.35 - 7.45    
 PCO2 33 (L) 35 - 45 mmHg PO2 57 (L) 75 - 100 mmHg O2 SAT 90 (L) >95 % BICARBONATE 24 22 - 26 mmol/L  
 BASE DEFICIT 0.5 0 - 2 mmol/L  
 O2 METHOD BiPAP    
 FIO2 50.0 % SET RATE 14    
 EPAP/CPAP/PEEP 7 SITE Right Radial    
 ARLET'S TEST PASS    
GLUCOSE, POC Collection Time: 03/09/21  4:14 PM  
Result Value Ref Range Glucose (POC) 176 (H) 65 - 100 mg/dL Performed by [address] [place] [address] Dr العلي, POC Collection Time: 03/09/21  8:05 PM  
Result Value Ref Range Glucose (POC) 168 (H) 65 - 100 mg/dL Performed by Talha Gomez   
CBC WITH AUTOMATED DIFF Collection Time: 03/10/21  3:40 AM  
Result Value Ref Range WBC 20.3 (H) 3.6 - 11.0 K/uL  
 RBC 2.94 (L) 3.80 - 5.20 M/uL HGB 8.7 (L) 11.5 - 16.0 g/dL HCT 27.2 (L) 35.0 - 47.0 % MCV 92.5 80.0 - 99.0 FL  
 MCH 29.6 26.0 - 34.0 PG  
 MCHC 32.0 30.0 - 36.5 g/dL  
 RDW 15.5 (H) 11.5 - 14.5 % PLATELET 091 331 - 987 K/uL MPV 12.1 8.9 - 12.9 FL  
 NEUTROPHILS 82 (H) 32 - 75 % LYMPHOCYTES 9 (L) 12 - 49 % MONOCYTES 7 5 - 13 % EOSINOPHILS 1 0 - 7 % BASOPHILS 0 0 - 1 % IMMATURE GRANULOCYTES 1 (H) 0.0 - 0.5 % ABS. NEUTROPHILS 16.9 (H) 1.8 - 8.0 K/UL  
 ABS. LYMPHOCYTES 1.7 0.8 - 3.5 K/UL  
 ABS. MONOCYTES 1.5 (H) 0.0 - 1.0 K/UL  
 ABS. EOSINOPHILS 0.2 0.0 - 0.4 K/UL  
 ABS. BASOPHILS 0.0 0.0 - 0.1 K/UL  
 ABS. IMM. GRANS. 0.1 (H) 0.00 - 0.04 K/UL  
 DF AUTOMATED    
C REACTIVE PROTEIN, QT Collection Time: 03/10/21  3:40 AM  
Result Value Ref Range C-Reactive protein 15.70 (H) 0.00 - 0.60 mg/dL PROCALCITONIN Collection Time: 03/10/21  3:40 AM  
Result Value Ref Range Procalcitonin 3.46 (H) 0 ng/mL RENAL FUNCTION PANEL Collection Time: 03/10/21  3:40 AM  
Result Value Ref Range Sodium 134 (L) 136 - 145 mmol/L Potassium 3.9 3.5 - 5.1 mmol/L Chloride 97 97 - 108 mmol/L  
 CO2 23 21 - 32 mmol/L Anion gap 14 5 - 15 mmol/L Glucose 148 (H) 65 - 100 mg/dL BUN 58 (H) 6 - 20 mg/dL Creatinine 6.77 (H) 0.55 - 1.02 mg/dL BUN/Creatinine ratio 9 (L) 12 - 20 GFR est AA 7 (L) >60 ml/min/1.73m2 GFR est non-AA 6 (L) >60 ml/min/1.73m2 Calcium 8.5 8.5 - 10.1 mg/dL Phosphorus 6.8 (H) 2.6 - 4.7 mg/dL Albumin 1.9 (L) 3.5 - 5.0 g/dL BLOOD GAS, ARTERIAL Collection Time: 03/10/21  4:00 AM  
Result Value Ref Range pH 7.39 7.35 - 7.45    
 PCO2 38 35 - 45 mmHg PO2 59 (L) 75 - 100 mmHg O2 SAT 91 (L) >95 % BICARBONATE 23 22 - 26 mmol/L  
 BASE DEFICIT 1.6 0 - 2 mmol/L  
 O2 METHOD High Flow O2    
 O2 FLOW RATE 10 L/min  
 EPAP/CPAP/PEEP 0    
 SITE Right Brachial    
 ARLET'S TEST PASS Critical value read back MAGDA VIAN RN   
GLUCOSE, POC Collection Time: 03/10/21  8:07 AM  
Result Value Ref Range Glucose (POC) 217 (H) 65 - 100 mg/dL Performed by Evelina Nesbitt, POC Collection Time: 03/10/21 11:27 AM  
Result Value Ref Range Glucose (POC) 172 (H) 65 - 100 mg/dL Performed by Maribell Alonzo   
 
 
IR [address] CVAD Final Result Successful placement of a Trialysis hemodialysis catheter. The  
catheter is ready for use. IR [facility] Final Result Successful placement of a Trialysis hemodialysis catheter. The  
catheter is ready for use. IR INSERT NON TUNL CVC OVER 5 YRS Final Result Successful placement of a Trialysis hemodialysis catheter. The  
catheter is ready for use. XR CHEST PORT Final Result XR CHEST PORT Final Result CT CHEST WO CONT Final Result Left upper lobe pneumonia. Small left pleural effusion. Bilateral  
groundglass airspace opacities, edema versus pneumonia. Atherosclerosis. Heavily  
calcified mitral annulus. XR CHEST PORT Final Result New findings of confluent airspace consolidation involving most of  
the left hemithorax, relatively sparing the left lung base, radiographic  
appearance suspicious for pneumonia, but there does appear to be background  
bilateral interstitial pulmonary edema with nodular groundglass opacity in the  
right upper hemithorax possibly representing asymmetric edema (versus  
pneumonia). Calcified aortic knob. Prominently calcified mitral annulus. Stable  
enlargement of cardiopericardial silhouette. Sequelae of laparoscopic gastric  
banding. XR ABD (KUB) Final Result Sequelae of laparoscopic gastric banding. Mild gaseous distention of  
the stomach. No evidence of intestinal obstruction. Colonic stool. Arterial  
calcification. XR CHEST PORT Final Result No acute abnormality seen. CT Results  (Last 48 hours) None Assessment: 1. Acute respiratory failure with hypoxia. 2.  Bilateral pneumonia. Left upper lobe appears to be aspiration with dense pneumonia. 3.  Acute pulmonary edema. 4.  Left pleural effusion. 5.  Sepsis. 6. MRSA bacteremia. 7.  End-stage renal disease on hemodialysis. 8.  Hypertension. 9.  Constipation. 10.  Hyperglycemia Plan:  
 
Patient requires continued ICU level of care she remains critically ill requiring intermittent use of BiPAP therapy for work of breathing and hypoxia. Currently on BiPAP 14/6/50% and her ABG this morning was 7.39/38/59 on 3 L nasal cannula. No indication for intubation at this time, but I suspect that she may be heading that way if her respiratory status does not continue to improve. Goal O2 sats greater than 90%. Patient's respiratory failure appears to be a combination of underlying bilateral pulmonary edema and pleural effusion as well as dense, likely MRSA pneumonia. Currently on broad-spectrum antibiotics per ID, currently on IV ceftaroline/daptomycin/Flagyl.  
Surveillance blood cultures still positive from 3/8/2021, repeat blood cultures from 3/9 still pending. CRP/procalcitonin/leukocytosis mildly improving today. Sputum culture unable to be obtained thus far. Would be glad to perform a bronchoscopy with BAL if her respiratory status significantly improves or if she becomes intubated. Unfortunately, she is too unstable to be able to safely perform bronchoscopy at this time. Would strongly recommend consideration of removal of her AV fistula as this is the most likely etiology of her persistent bacteremia. It would be rare for a pneumonia to continue to cause bacteremia. Appreciate ID input. Continue on scheduled Mucomyst nebs every 6 hours, duo nebs every 6 hours, guaifenesin ER 1200 mg p.o. twice daily, as well as chest physiotherapy every 6 hours. There also appears to be an element of pulmonary edema/volume overload 2 failed hemodialysis sessions in a row, poor access with her AV graft. Now has a right IJ dialysis catheter, undergoing hemodialysis today without any issues. Blood pressure staying stable. Appreciate nephrology input. BNP level is elevated at 1408. Bowel regimen for constipation DVT and GI prophylaxis Tenuous clinical status. Prognosis guarded Questions of patient were answered at bedside in detail Case discussed in detail with RN, RT, and care team 
Thank you for involving me in the care of this patient I will follow with you closely during hospitalization Time spent more than 50 minutes in direct patient care with no overlap reviewing results and records, decision making, and answering questions. Guy Nelson,  
[facility] 3/10/2021 
4:33 PM

## 2021-03-10 NOTE — PROGRESS NOTES
Progress Note Patient: Germaine Monzon MRN: [id number]  SSN: [id number] YOB: 1955  Age: 77 y.o. Sex: female Admit Date: 2/24/2021 LOS: 14 days Subjective:  
Patient followed for sepsis with persistent MRSA bacteremia currently on Daptomycin and Ceftaroline. Repeat blood cultures again positive. Also being treating for HCAP primarily left lung. She is afebrile with decreasing WBC, procal and CRP. She remains in the ICU. She is now on BIPAP. Objective:  
 
Vitals:  
 03/10/21 0500 03/10/21 0610 03/10/21 0700 03/10/21 0703 BP: 118/73 138/74 Pulse: 84 88 Resp: 23 27 Temp:   98.1 °F (36.7 °C) TempSrc:      
SpO2: 96% 96% 93% 93% Weight:      
Height:      
  
 
Intake and Output: 
Current Shift: No intake/output data recorded. Last three shifts: 03/08 1901 - 03/10 0700 In: 61 [P.O.:60] Out: 0 Physical Exam:  
 Constitutional:   
   General: She is not in acute distress. Appearance: She is obese. She is ill-appearing. HENT: Nasal cannula 50% Head: Normocephalic and atraumatic. Nose: Nose normal.  
   Mouth/Throat:  
   Pharynx: Oropharynx is clear. Eyes:  
   Pupils: Pupils are equal, round, and reactive to light. Cardiovascular:  
   Rate and Rhythm: Normal rate and regular rhythm. Heart sounds: No murmur. Pulmonary:  
   Breath sounds: No wheezing, rhonchi or rales. Genitourinary: 
   Comments: No Ann Musculoskeletal:  
   Right lower leg: No edema. Left lower leg: No edema. Comments: Left upper arm graft without tenderness or erythema, no drainage Skin: 
   Findings: No rash. Neurological:  
   General: No focal deficit present. Mental Status: She is alert and oriented to person, place, and time. Psychiatric:     
   Mood and Affect: Mood normal.     
   Behavior: Behavior normal.     
   Thought Content: Thought content normal.     
   Judgment: Judgment normal. 
 
Lab/Data Review: WBC [address] Procalcitonin 3.46 <3.66 < 128.41 <180.97  
CRP 15.70 <16.60 <19.00  <16.60 Blood cultures (2/24) MRSA Blood cultures (2/25) MRSA Blood cultures (2/26) MRSA Blood cultures (2/27) MRSA Blood cultures (3/1) MRSA Blood cultures (3/3) No growth FINAL Blood cultures (3/4) MRSA Blood cultures (3/8) Gram positive cocci in clusters Blood cultures (3/9) Pending 
 
TTE (2/28)  Tricuspid valve with possible echogenic mobile linear densities. SAYDA (3/4) No echocardiographic evidence of valvular vegetation or endocarditis appreciated. Assessment:  
 
Active Problems: 
  ESRD on dialysis ([facility] [address] (2/24/2021) Sepsis ([facility] [address] (2/24/2021) 
 
  
1. Sepsis with fever, leukocytosis and elevated lactic acid, procalcitonin and CRP 2. Persistent MRSA bacteremia, Day #15 IV Antibiotics, now Day #4 Daptomycin and Ceftaroline, SAYDA negative for endocarditis 3. Presumptive infected AV graft left arm, secondary to above 4. ?HCAP, left lung, on Ceftaroline and Flagyl 5. ESRD on HD 6. Covid-19 negative Comment:  After 4 days of Daptomycin and Ceftaroline, blood cultures remain positive. I think that we are probably at the point to consider removing AV graft. Plan: 1. Continue Daptomycin 8 mg/kg IV along with Ceftaroline 2. Continue Flagyl for suspected aspiration pneumonia 3. In am, repeat CBC, procal and CRP 4. Confer with Nephrology and General Surgery regarding AV fistula 5. Follow-up repeat blood cultures Signed By: Praful Benton MD   
 March 10, 2021

## 2021-03-10 NOTE — PROGRESS NOTES
Pt was placed on Bipap at the beginning of this shift per patient request. Just asked to be taken off, sp02 dropped to 80% on 10lpm v. NC, placed back on Bipap.

## 2021-03-10 NOTE — PROGRESS NOTES
PT WAS MOVED TO ROOM 273 TO A RED BED (FOR CHEST PT AND PERCUSSION) AND TO BE CLOSER TO NURSE'S STATION AS DAUGHTER STATES THAT SHE WILL TRY TO GET OUT OF BED. TOLERATED MOVE WELL.

## 2021-03-10 NOTE — PROGRESS NOTES
Hospitalist Progress Note NAME: Piper Dear :  1955 MRN:  [id number] Subjective:  
Patient seen in ICU, case discussed with  Staff getting HD. Needing Cont BIPAP. She de-sats easily without bipap. Hard to communicate with mask on. She was pointing to taking off bipap. Objective: VITALS:  
Last 24hrs VS reviewed since prior progress note. Most recent are: 
Patient Vitals for the past 24 hrs: 
 Temp Pulse Resp BP SpO2  
03/10/21 1045   23  94 % 03/10/21 1030   23 (!) 129/58 93 % 03/10/21 1015   23 119/64 94 % 03/10/21 1000   23 99/64 94 % 03/10/21 0945   28 106/77 95 % 03/10/21 0930 98.3 °F (36.8 °C) 90 30 (!) 107/91 94 % 03/10/21 0901  92 (!) 32 117/80 96 % 03/10/21 0800  95 23 129/60 95 % 03/10/21 0703     93 % 03/10/21 0700 98.1 °F (36.7 °C) 95 22 136/70 94 % 03/10/21 0610  88 27 138/74 96 % 03/10/21 0500  84 23 118/73 96 % 03/10/21 0428     98 % 03/10/21 0400  87 22 137/63 97 % 03/10/21 0300 97.7 °F (36.5 °C) 86 18 135/66 96 % 03/10/21 0215  86 19 125/88 98 % 03/10/21 0055  80 26 (!) 114/47 94 % 03/10/21 0021  84 24 107/82 95 % 21 2300  81 21 (!) 111/54 95 % 21 2233 97.1 °F (36.2 °C) 81 25 (!) 94/49 95 % 21 2100  88 24 114/78 96 % 21 2000  92 21 (!) 119/57 97 % 21 1925 98.1 °F (36.7 °C) 86 21 94/60 97 % 21 1900 98 °F (36.7 °C) 87 21 (!) 96/51 96 % 21 1700  80 22 115/62 94 % 21 1600  80 24 (!) 104/54 94 % 21 1500 97.9 °F (36.6 °C) 82 24 (!) 152/123 94 % 21 1401     93 % 21 1400  83 26 131/71 96 % 21 1300  79 24 117/68 95 % 21 1200  79 25  95 % No intake or output data in the 24 hours ending 03/10/21 1115 PHYSICAL EXAM: 
General: On bipap Resp:  Needing Bipap CV:  Regular  rhythm,  No edema GI:  Soft, Non distended, Non tender. Neurologic:  Alert and follows commands Psych:     anxious Skin:    lue avf. Procedures: see electronic medical records for all procedures/Xrays and details which were not copied into this note but were reviewed prior to creation of Plan. LABS: 
I reviewed today's most current labs and imaging studies. Pertinent labs include: 
Recent Results (from the past 12 hour(s)) CBC WITH AUTOMATED DIFF Collection Time: 03/10/21  3:40 AM  
Result Value Ref Range WBC 20.3 (H) 3.6 - 11.0 K/uL  
 RBC 2.94 (L) 3.80 - 5.20 M/uL HGB 8.7 (L) 11.5 - 16.0 g/dL HCT 27.2 (L) 35.0 - 47.0 % MCV 92.5 80.0 - 99.0 FL  
 MCH 29.6 26.0 - 34.0 PG  
 MCHC 32.0 30.0 - 36.5 g/dL  
 RDW 15.5 (H) 11.5 - 14.5 % PLATELET 317 474 - 048 K/uL MPV 12.1 8.9 - 12.9 FL  
 NEUTROPHILS 82 (H) 32 - 75 % LYMPHOCYTES 9 (L) 12 - 49 % MONOCYTES 7 5 - 13 % EOSINOPHILS 1 0 - 7 % BASOPHILS 0 0 - 1 % IMMATURE GRANULOCYTES 1 (H) 0.0 - 0.5 % ABS. NEUTROPHILS 16.9 (H) 1.8 - 8.0 K/UL  
 ABS. LYMPHOCYTES 1.7 0.8 - 3.5 K/UL  
 ABS. MONOCYTES 1.5 (H) 0.0 - 1.0 K/UL  
 ABS. EOSINOPHILS 0.2 0.0 - 0.4 K/UL  
 ABS. BASOPHILS 0.0 0.0 - 0.1 K/UL  
 ABS. IMM. GRANS. 0.1 (H) 0.00 - 0.04 K/UL  
 DF AUTOMATED    
C REACTIVE PROTEIN, QT Collection Time: 03/10/21  3:40 AM  
Result Value Ref Range C-Reactive protein 15.70 (H) 0.00 - 0.60 mg/dL PROCALCITONIN Collection Time: 03/10/21  3:40 AM  
Result Value Ref Range Procalcitonin 3.46 (H) 0 ng/mL RENAL FUNCTION PANEL Collection Time: 03/10/21  3:40 AM  
Result Value Ref Range Sodium 134 (L) 136 - 145 mmol/L Potassium 3.9 3.5 - 5.1 mmol/L Chloride 97 97 - 108 mmol/L  
 CO2 23 21 - 32 mmol/L Anion gap 14 5 - 15 mmol/L Glucose 148 (H) 65 - 100 mg/dL BUN 58 (H) 6 - 20 mg/dL Creatinine 6.77 (H) 0.55 - 1.02 mg/dL BUN/Creatinine ratio 9 (L) 12 - 20 GFR est AA 7 (L) >60 ml/min/1.73m2 GFR est non-AA 6 (L) >60 ml/min/1.73m2 Calcium 8.5 8.5 - 10.1 mg/dL Phosphorus 6.8 (H) 2.6 - 4.7 mg/dL  Albumin 1.9 (L) 3.5 - 5.0 g/dL BLOOD GAS, ARTERIAL Collection Time: 03/10/21  4:00 AM  
Result Value Ref Range pH 7.39 7.35 - 7.45    
 PCO2 38 35 - 45 mmHg PO2 59 (L) 75 - 100 mmHg O2 SAT 91 (L) >95 % BICARBONATE 23 22 - 26 mmol/L  
 BASE DEFICIT 1.6 0 - 2 mmol/L  
 O2 METHOD High Flow O2    
 O2 FLOW RATE 10 L/min  
 EPAP/CPAP/PEEP 0    
 SITE Right Brachial    
 ARLET'S TEST PASS Critical value read back MAGDA IVAN RN   
GLUCOSE, POC Collection Time: 03/10/21  8:07 AM  
Result Value Ref Range Glucose (POC) 217 (H) 65 - 100 mg/dL Performed by Roshni Gist Reviewed most current lab test results and cultures  YES Reviewed most current radiology test results   YES Review and summation of old records today    NO Reviewed patient's current orders and MAR    YES 
PMH/SH reviewed - no change compared to H&P 
______________________________________ Assessment/Plan: 
 
Sepsis from mrsa bacteremia Persistent mrsa bacteremia, presumptive infected av graft left arm. Similar presentation 1 year ago, left arm av graft had to be removed to control bacteremia, could not be completely removed without compromising limb reportedly. Graft was replaced 9/2020. Left arm avg will likely need to be removed before blcs can be sterilized- ID appreciated. Latest blcs 3/4/21 reporting mrsa x 2, 3/6 again growing gpc Vanco stopped in favor of Daptomycin, ceftaroline continued. (lipitor held 2/2 interaction with dapto)-ck 42. SAYDA 3/4/21, no veggies Acute resp failure with hypoxia Bilateral pna- william appears to be aspiration with dense pna 
maintrain sao2>90%,cont monitor in  icu on bipap Underlying pulmonary edema contributing likely as well. Continues ceftaroline/daptomycin/flagyl Pulm on case defer to them 
  
End-stage renal disease on hemodialysis patient has a history of end-stage renal disease on hemodialysis with concerns of clotting of fistula  Nephrology following.  HD  Today per renal. 
  
type 2 diabetesA1c 8. 6. continue ssi/hypoglycemia protocol. Remains above goal, titrate to pta lantus 14 units every day. 
 
  
Hypertension hold home emds as  BP low normal. 
  
Hyperlipidemia Statin held while on daptomycin. Ck 42. AOCD:    monitor hgb >8 and transfuse if <7 ProphylaxisHeparin subcu Full code, NOK daughter [phone] updated 3/9/21 Disposition-  TBD 
________________________________________________________________________ Shamir Ag MD

## 2021-03-10 NOTE — PROGRESS NOTES
Hearing aide found and given to daughter Cristi Elise who cleaned and placed back in patient's right ear.

## 2021-03-10 NOTE — PROGRESS NOTES
She is on Bipap and 75% o2.  Pulmonary status getting worst.  Blood cultures from yest pending.  She did tolerate dialysis via catheter, when she was using graft last time she did not.

## 2021-03-10 NOTE — PROGRESS NOTES
PT LOST HER RIGHT EAR HEARING AIDE IN THE BED. NURSES CHECKED THE BED TWICE; AS WELL AS THE FLOOR TWICE WITHOUT FINDING ANYTHING.  ALSO; LINEN IN LINEN BAG WAS CHECKED TWICE AND SHOOK. NO HEARING AIDE WAS FOUND. PT BECAME SHORT OF BREATH AT 0430, STATING THAT SHE COULDN'T BREATHE. PULSE OX 94%. NO OBVIOUS DYPSNEA NOTED. RESPIRATORY THERAPIST IN; PLACED PT ON BIPAP. SHE REMAINED ON BIPAP UNTIL 0630, WHEN SHE ASKED FOR IT TO BE REMOVED. PLACED ON NASAL CANNULA AT 10 L/M.  AUDIBLE WHEEZING NOTED. PULSE OX 96%. RESPIRATORY CALLED FOR TREATMENT.  
 
REPORT GIVEN TO MICHAEL JONAS RN.

## 2021-03-10 NOTE — PROGRESS NOTES
Tx initiated via a patent right trialysis catheter. No s/s and symptoms of infection noted at the site. Dressing to site is dry and intact and was changed on 03/09/2021 with a chlorhexidine disc and transparent dressing in place. 1230pm 
Patient dialyzed for 3 hours and removed 1.5kgs. No dialysis related issues noted

## 2021-03-10 NOTE — PROGRESS NOTES
Chart reviewed. Patient previously declined SNF. Patient is accepted with [facility] once stable for discharge. Patient is current with [facility] for HD. CM will continue to follow.

## 2021-03-10 NOTE — ROUTINE PROCESS
Patient has been on Bipap most of the day. When removed immediately spo2 drops to the 80's. Was able to tolerate high flow by nasal canula for a short period to drink something. Dr. Tennille Mason made aware that patient is now at 90% o2 on the bipap, as well as updating with the most recent settings. No new orders, will continue to monitor. Watch for patient tiring out on Bipap.

## 2021-03-11 NOTE — PROGRESS NOTES
Progress Note Patient: Qing Woo MRN: [id number]  SSN: [id number] YOB: 1955  Age: 77 y.o. Sex: female Admit Date: 2/24/2021 LOS: 15 days Subjective:  
Patient followed for sepsis with persistent MRSA bacteremia currently on Daptomycin and Ceftaroline. Repeat blood cultures again positive. Also being treating for HCAP with CXR showing improvement left lung but worsening right lung. She is afebrile but all inflammatory markers increased today, though last set of blood cultures negative at 1 day. She remains in the ICU on BIPAP. Patient having worsening respiratory difficulty. Unable to access patient at this time. Pulmonary at bedside. Objective:  
 
Vitals:  
 03/11/21 0700 03/11/21 0800 03/11/21 0838 03/11/21 0900 BP: 128/60 (!) 104/54  114/61 Pulse: 93 86  91 Resp: 22 23  27 Temp: 98.3 °F (36.8 °C) TempSrc:      
SpO2: 91% (!) 86% 96% 94% Weight:      
Height:      
  
 
Intake and Output: 
Current Shift: No intake/output data recorded. Last three shifts: 03/09 1901 - 03/11 0700 In: 180 [P.O.:180] Out: 96 [address] Physical Exam:  
 Constitutional:   
   General: She is not in acute distress. Appearance: She is obese. She is ill-appearing. HENT: BIPAP 100% Head: Normocephalic and atraumatic. Nose: Nose normal.  
   Mouth/Throat:  
   Pharynx: Oropharynx is clear. Eyes:  
   Pupils: Pupils are equal, round, and reactive to light. Cardiovascular:  
   Rate and Rhythm: Normal rate and regular rhythm. Heart sounds: No murmur. Pulmonary:  
   Breath sounds: No wheezing, rhonchi or rales. Genitourinary: 
   Comments: No Ann Musculoskeletal:  
   Right lower leg: No edema. Left lower leg: No edema. Comments: Left upper arm graft without tenderness or erythema, no drainage Skin: 
   Findings: No rash. Neurological:  
   General: No focal deficit present.   
   Mental Status: She is alert and oriented to person, place, and time. Psychiatric:     
   Mood and Affect: Mood normal.     
   Behavior: Behavior normal.     
   Thought Content: Thought content normal.     
   Judgment: Judgment normal. 
 
Lab/Data Review: WBC 30,900 Procalcitonin 17.00 <3.46 <3.66 < 128.41 <180.97  
CRP 29.00 <15.70 <16.60 <19.00  <16.60 Blood cultures (2/24) MRSA Blood cultures (2/25) MRSA Blood cultures (2/26) MRSA Blood cultures (2/27) MRSA Blood cultures (3/1) MRSA Blood cultures (3/3) No growth FINAL Blood cultures (3/4) MRSA Blood cultures (3/8) MRSA Blood cultures (3/9) No growth 1 day 
 
TTE (2/28)  Tricuspid valve with possible echogenic mobile linear densities. SAYDA (3/4) No echocardiographic evidence of valvular vegetation or endocarditis appreciated. CXR (3/11) Significant improvement of left upper lobe consolidation. Increased right upper 
lobe consolidation. New atelectasis at left base plus minus small effusion. Normal heart and stable mediastinum Assessment:  
 
Active Problems: 
  ESRD on dialysis ([facility] [address] (2/24/2021) Sepsis ([facility] [address] (2/24/2021) 
 
  
1. Sepsis with fever, leukocytosis and elevated lactic acid, procalcitonin and CRP 2. Persistent MRSA bacteremia, Day #15 IV Antibiotics, now Day #4 Daptomycin and Ceftaroline, SAYDA negative for endocarditis 3. Presumptive infected AV graft left arm, secondary to above 4. ?HCAP, left lung, on Ceftaroline and Flagyl 5. ESRD on HD 6. Covid-19 negative Comment:  Inflammatory markers worsening but unclear whether related to MRSA bacteremia and/or her presumed pneumonia. She is hemodynamically stable. Would like to augment treatment for pneumonia with Meropenem. If last set of blood cultures are positive, would favor removing AV graft. Plan: 1. Continue Daptomycin 8 mg/kg IV along with Ceftaroline 2. Discontinue Flagyl 3. Start IV Meropenenem 4. In am, repeat CBC, procal and CRP 5. Follow-up repeat blood cultures Signed By: Clau Carreno MD   
 March 11, 2021

## 2021-03-11 NOTE — PROGRESS NOTES
Renal progress Note NAME:  Cynthia Upton :   1955 MRN:   [id number] Date/Time:  3/10/2021 1:08 PM 
 
 
Subjective:  
Patient seen on HD in ICU, on bipap, Awake, well oriented, hard of hearing No edema, improved hemodynamic status Objective: VITALS:   
Visit Vitals BP (!) 102/54 (BP 1 Location: Right lower arm) Pulse 98 Temp 98.9 °F (37.2 °C) Resp (!) 34 Ht 5' 3\" (1.6 m) Wt 91.1 kg (200 lb 13.4 oz) SpO2 93% BMI 35.58 kg/m² Temp (24hrs), Av.1 °F (36.7 °C), Min:97.1 °F (36.2 °C), Max:98.9 °F (37.2 °C) PHYSICAL EXAM:  
General: alert awake, on bipap Anxious HEENT: EOMI, no Icterus, no Pallor, pupils reactive, Neck: Neck is supple, No JVD, no thyromegaly,  
Lungs:decreased BS left base,, few rales+ CVS: heart sounds normal, regular rate and rhythm, no murmurs, no rubs. GI: soft, nontender, normal BS, Extremeties: no cyanosis, no edema, Neuro: Alert, awake, speech is normal ,  
Skin: normal skin turgor, no skin rashes LAB DATA REVIEWED:   
Recent Results (from the past 24 hour(s)) CBC WITH AUTOMATED DIFF Collection Time: 03/10/21  3:40 AM  
Result Value Ref Range WBC 20.3 (H) 3.6 - 11.0 K/uL  
 RBC 2.94 (L) 3.80 - 5.20 M/uL HGB 8.7 (L) 11.5 - 16.0 g/dL HCT 27.2 (L) 35.0 - 47.0 % MCV 92.5 80.0 - 99.0 FL  
 MCH 29.6 26.0 - 34.0 PG  
 MCHC 32.0 30.0 - 36.5 g/dL  
 RDW 15.5 (H) 11.5 - 14.5 % PLATELET 155 207 - 063 K/uL MPV 12.1 8.9 - 12.9 FL  
 NEUTROPHILS 82 (H) 32 - 75 % LYMPHOCYTES 9 (L) 12 - 49 % MONOCYTES 7 5 - 13 % EOSINOPHILS 1 0 - 7 % BASOPHILS 0 0 - 1 % IMMATURE GRANULOCYTES 1 (H) 0.0 - 0.5 % ABS. NEUTROPHILS 16.9 (H) 1.8 - 8.0 K/UL  
 ABS. LYMPHOCYTES 1.7 0.8 - 3.5 K/UL  
 ABS. MONOCYTES 1.5 (H) 0.0 - 1.0 K/UL  
 ABS. EOSINOPHILS 0.2 0.0 - 0.4 K/UL  
 ABS. BASOPHILS 0.0 0.0 - 0.1 K/UL  
 ABS. IMM. GRANS. 0.1 (H) 0.00 - 0.04 K/UL  
 DF AUTOMATED    
C REACTIVE PROTEIN, QT  Collection Time: 03/10/21 3:40 AM  
Result Value Ref Range C-Reactive protein 15.70 (H) 0.00 - 0.60 mg/dL PROCALCITONIN Collection Time: 03/10/21  3:40 AM  
Result Value Ref Range Procalcitonin 3.46 (H) 0 ng/mL RENAL FUNCTION PANEL Collection Time: 03/10/21  3:40 AM  
Result Value Ref Range Sodium 134 (L) 136 - 145 mmol/L Potassium 3.9 3.5 - 5.1 mmol/L Chloride 97 97 - 108 mmol/L  
 CO2 23 21 - 32 mmol/L Anion gap 14 5 - 15 mmol/L Glucose 148 (H) 65 - 100 mg/dL BUN 58 (H) 6 - 20 mg/dL Creatinine 6.77 (H) 0.55 - 1.02 mg/dL BUN/Creatinine ratio 9 (L) 12 - 20 GFR est AA 7 (L) >60 ml/min/1.73m2 GFR est non-AA 6 (L) >60 ml/min/1.73m2 Calcium 8.5 8.5 - 10.1 mg/dL Phosphorus 6.8 (H) 2.6 - 4.7 mg/dL Albumin 1.9 (L) 3.5 - 5.0 g/dL BLOOD GAS, ARTERIAL Collection Time: 03/10/21  4:00 AM  
Result Value Ref Range pH 7.39 7.35 - 7.45    
 PCO2 38 35 - 45 mmHg PO2 59 (L) 75 - 100 mmHg O2 SAT 91 (L) >95 % BICARBONATE 23 22 - 26 mmol/L  
 BASE DEFICIT 1.6 0 - 2 mmol/L  
 O2 METHOD High Flow O2    
 O2 FLOW RATE 10 L/min  
 EPAP/CPAP/PEEP 0    
 SITE Right Brachial    
 ARLET'S TEST PASS Critical value read back MAGDA IVAN RN   
GLUCOSE, POC Collection Time: 03/10/21  8:07 AM  
Result Value Ref Range Glucose (POC) 217 (H) 65 - 100 mg/dL Performed by Tabby Garcia, POC Collection Time: 03/10/21 11:27 AM  
Result Value Ref Range Glucose (POC) 172 (H) 65 - 100 mg/dL Performed by Tabby Garcia, POC Collection Time: 03/10/21  3:34 PM  
Result Value Ref Range Glucose (POC) 168 (H) 65 - 100 mg/dL Performed by Frances Mcdonald Recommendations/Plan:  
#1 ESRD: on HD at [facility] every TTS 
--No uremia, SOB is probably related to her pneumonia, clinically no evidence of fluid overload, no peripheral edema Continue antibiotics as per ID Seen on HD, tolerated better today with temp HD cath Will repeat HD on Friday 2. Hyperkalemia: Secondary to end-stage renal disease Continue renal diet Improved and stable #3 renal osteodystrophy 
-Not noted to be on any phosphorus binders. will check phosphorus with next labs. Obtain PTH from outpatient and continue Zemplar if indicated 
-Calcium is okay #3 anemia acute on chronic 
-hemoglobin yesterday was 6.9, -She received blood and repeat Hb is 8.4 
-Continue Procrit with dialysis #4 sepsis: Persistent MRSA bacteremia ID following, continue IV antibiotics as per ID Persistent bacteremia: multiple BCs since 2/25 are postive, lateest from 3/6 and 3/8 are positive Highly suspicious for AVG infection Patient had previous episode of infected AV graft requiring removal 
She may need graft removal again , Dr. Neela Hadren following Recommend to avoid AV graft use for now as patient is not tolerating hemodialysis possibly from bacteremia Continue to use triple-lumen temporary hemodialysis catheter for hemodialysis and also to maintain IV access #5 hypertension 
-amlodipine and hydralazine were discontinued #6 hyponatremia 
-Sodium stable at 134 
-Will plan dialysis with high sodium bath. -Restrict fluid intake 7. Acute hypoxic respiratory failure: Placed on BiPAP and transferred to the intensive care unit Left lower lobe dense infiltrate present, continue antibiotics Pulmonary following closely 
 
 
________________________________________________________________________ Signed: Stephanie Buchanan MD

## 2021-03-11 NOTE — PROGRESS NOTES
0800 Dr. Barbie So made aware of patient's respiratory status and changes during the night. 1707 Dr. Sendy Wright made aware of critical procalcitonin level of 17, and elevated WBC of 30.9  
 
1025  Spoke with patient and let her know that she would need the breathing tube today, asked if she still wanted that to help her breath and she shook her head yes. Also, called patient's daughter Luis Miguel Moeller and made her aware that her mother would be intubated today, also agrees with plan. 1300 The patient is s/p multiple code blues after intubation. Family is in at bedside.

## 2021-03-11 NOTE — PROGRESS NOTES
Hospitalist Progress Note NAME: Patrice Kee :  1955 MRN:  [id number] Subjective:  
Patient seen and evaluated at bedside, of note overnight patient's respiratory status worsened, patient currently on BiPAP at 100%, patient minimally responsive to painful and verbal stimuli, discussed with RN at bedside. Objective: VITALS:  
Last 24hrs VS reviewed since prior progress note. Most recent are: 
Patient Vitals for the past 24 hrs: 
 Temp Pulse Resp BP SpO2  
21 0800  86 23 (!) 104/54 (!) 86 % 21 0700 98.3 °F (36.8 °C) 93 22 128/60 91 % 21 0600  95 27 (!) 135/50 91 % 21 0500  94 27 (!) 143/63 94 % 21 0400  93 27 (!) 132/58 92 % 21 0300  94 30 (!) 104/55 93 % 21 0200  95 29 (!) 111/54 92 % 21 0100  97 29 (!) 115/54 93 % 21 0000  99 29 (!) 135/58 99 % 03/10/21 2300  99 26 (!) 110/46 99 % 03/10/21 2200  94 23 (!) 119/48 92 % 03/10/21 2109  (!) 103 26 (!) 108/53 95 % 03/10/21 2000  100 28 138/78 93 % 03/1955  (!) 103 28 126/63 93 % 03/10/21 1800  98 (!) 34 (!) 102/54 91 % 03/10/21 1700  100 (!) 31 (!) 146/75 91 % 03/10/21 1600  100 (!) 31 114/63 91 % 03/10/21 1500 98.9 °F (37.2 °C) 98 29 114/69 93 % 03/10/21 1400  97 30 115/73 94 % 03/10/21 1300  94 28 (!) 137/98 95 % 03/10/21 1230  89 27 (!) 107/51   
03/10/21 1215  90 22 (!) 100/56 93 % 03/10/21 1200  91 27 106/63 94 % 03/10/21 1145   30 (!) 118/45 93 % 03/10/21 1134 98.3 °F (36.8 °C) 93 23 (!) 112/57 94 % 03/10/21 1115   22 (!) 107/47 93 % 03/10/21 1100   24 110/69 96 % 03/10/21 1045   23 (!) 136/58 94 % 03/10/21 1030   23 (!) 129/58 93 % 03/10/21 1015   23 119/64 94 % 03/10/21 1000  94 23 99/64 94 % 03/10/21 0945   28 106/77 95 % 03/10/21 0930 98.3 °F (36.8 °C) 90 30 (!) 107/91 94 % 03/10/21 0901  92 (!) 32 117/80 96 % Intake/Output Summary (Last 24 hours) at 3/11/2021 9547 Last data filed at 3/11/2021 0600 Gross per 24 hour Intake 120 ml Output 1850 ml Net -1730 ml PHYSICAL EXAM: 
General:           Responding minimally to painful and verbal stimuli EENT:              EOMI. Anicteric sclerae. MMM Resp:                Decreased air entry bilaterally with appreciable bilateral bibasilar crackles CV:                  Regular  rhythm, S1 plus S2, no murmurs rubs or gallopsNo edema GI:                   Soft, Non distended, Non tender.  +Bowel sounds Neurologic:        Limited secondary to patient's clinical status. Psych:    Limited secondary to patient's clinical status Skin:                No rashes. No jaundice lue avf. Procedures: see electronic medical records for all procedures/Xrays and details which were not copied into this note but were reviewed prior to creation of Plan. LABS: 
I reviewed today's most current labs and imaging studies. Pertinent labs include: 
Recent Results (from the past 12 hour(s)) GLUCOSE, POC Collection Time: 03/10/21  9:08 PM  
Result Value Ref Range Glucose (POC) 233 (H) 65 - 100 mg/dL Performed by Lul Meeks   
CBC WITH AUTOMATED DIFF Collection Time: 03/11/21  5:17 AM  
Result Value Ref Range WBC 30.9 (H) 3.6 - 11.0 K/uL  
 RBC 2.63 (L) 3.80 - 5.20 M/uL HGB 7.9 (L) 11.5 - 16.0 g/dL HCT 24.4 (L) 35.0 - 47.0 % MCV 92.8 80.0 - 99.0 FL  
 MCH 30.0 26.0 - 34.0 PG  
 MCHC 32.4 30.0 - 36.5 g/dL  
 RDW 15.7 (H) 11.5 - 14.5 % PLATELET 650 540 - 215 K/uL MPV 11.4 8.9 - 12.9 FL  
 NEUTROPHILS 91 (H) 32 - 75 % LYMPHOCYTES 3 (L) 12 - 49 % MONOCYTES 5 5 - 13 % EOSINOPHILS 0 0 - 7 % BASOPHILS 0 0 - 1 % IMMATURE GRANULOCYTES 1 (H) 0.0 - 0.5 % ABS. NEUTROPHILS 28.4 (H) 1.8 - 8.0 K/UL  
 ABS. LYMPHOCYTES 1.1 0.8 - 3.5 K/UL  
 ABS. MONOCYTES 1.5 (H) 0.0 - 1.0 K/UL  
 ABS. EOSINOPHILS 0.0 0.0 - 0.4 K/UL  
 ABS. BASOPHILS 0.0 0.0 - 0.1 K/UL  
 ABS. IMM.  GRANS. 0.2 (H) 0.00 - 0.04 K/UL  
 DF AUTOMATED    
C REACTIVE PROTEIN, QT Collection Time: 03/11/21  5:17 AM  
Result Value Ref Range C-Reactive protein 29.00 (H) 0.00 - 0.60 mg/dL METABOLIC PANEL, BASIC Collection Time: 03/11/21  5:17 AM  
Result Value Ref Range Sodium 136 136 - 145 mmol/L Potassium 3.7 3.5 - 5.1 mmol/L Chloride 99 97 - 108 mmol/L  
 CO2 25 21 - 32 mmol/L Anion gap 12 5 - 15 mmol/L Glucose 244 (H) 65 - 100 mg/dL BUN 40 (H) 6 - 20 mg/dL Creatinine 4.79 (H) 0.55 - 1.02 mg/dL BUN/Creatinine ratio 8 (L) 12 - 20 GFR est AA 11 (L) >60 ml/min/1.73m2 GFR est non-AA 9 (L) >60 ml/min/1.73m2 Calcium 9.0 8.5 - 10.1 mg/dL BLOOD GAS, ARTERIAL Collection Time: 03/11/21  5:17 AM  
Result Value Ref Range pH 7.36 7.35 - 7.45    
 PCO2 42 35 - 45 mmHg PO2 83 75 - 100 mmHg O2 SAT 95 (L) >95 % BICARBONATE 23 22 - 26 mmol/L  
 BASE DEFICIT 2.1 (H) 0 - 2 mmol/L  
 O2 METHOD BiPAP    
 FIO2 90.0 % Tidal volume 550 SET RATE 18    
 EPAP/CPAP/PEEP 10 SITE Right Radial    
 ARLET'S TEST PASS    
GLUCOSE, POC Collection Time: 03/11/21  7:31 AM  
Result Value Ref Range Glucose (POC) 278 (H) 65 - 100 mg/dL Performed by [address] [place] [address] Drive Reviewed most current lab test results and cultures  YES Reviewed most current radiology test results   YES Review and summation of old records today    NO Reviewed patient's current orders and MAR    YES 
PMH/SH reviewed - no change compared to H&P 
______________________________________ Assessment/Plan: 
 
Sepsis secondary to MRSA bacteremia-of note patient found to have some persistent MRSA bacteremia likely secondary to infected AV fistula, patient had a similar prior episode, last surveillance cultures still positive for MRSA Follow-up surveillance cultures Continue daptomycin/ceftaroline for antibiotic coverage Of note patient evaluated by general surgery for possible graft revision General surgery consult appreciated, will continue to follow Infectious disease consult appreciated, will continue to follow Acute respiratory failure with hypoxia secondary to bilateral pneumonia/likely aspiration with worsening respiratory statusof note patient had worsening respiratory status overnight with acute respiratory failure with hypoxia secondary to bilateral pneumonia, patient having increased O2 requirements with an FiO2 of 100% on BiPAP at this time Continue BiPAP for ventilatory support Low threshold for intubation Follow blood cultures Follow-up sputum cultures Continue ceftaroline and daptomycin and Flagyl for broad-spectrum antibiotic coverage Pulmonology consult appreciated, will continue to follow Infectious disease consult appreciated, will continue to follow End-stage renal disease on hemodialysiscurrently tolerating dialysis Nephrology consult appreciated, will follow recommendations with regards to dialysis schedule Hyperglycemia type 2 diabetescontinue current insulin regimen Continue insulin sliding scale Recalculate insulin requirements according 24-hour coverage Hypertensioncurrently holding home blood pressure medications ProphylaxisHeparin subcu FENn.p.o. for now, replete potassium and magnesium Full code Surrogate decision-maker patient's daughter [phone] Dispositioncontinued ICU care pending clinical improvement Critical care time spent 45 minutes involving direct patient care as well as reviewing patient's labs and coordination of care with nursing staff 
 
________________________________________________________________________ Jim Longo MD

## 2021-03-11 NOTE — PROCEDURES
Intubation Note Procedure: emergent intubation Indication: respiratory failure and aspiration with AMS and sepsis Anesthesia- Rapid sequence: Etomidate 15 mg IV Paralysis: Rocuronium 90 mg IV Lidocaine: no  
Atropine: no  
After assessing the airway, the patient underwent preoxygenation with 100% FiO2 for 5 min utilizing BiPAP therapy. Above mentioned medications was given sequentially in rapid IV push. Intubation was achieved using a an S4 glidescope blade with stylet. After adequate sedation emergent intubation was performed. After positive identification of the vocal chords, size 7.5 ET tube was placed into the trachea with direct visualization. Cricoid Pressure: no  
Number of attempts: 1 The tube was seen passing through the vocal chords without some difficulty. CO2 colorimetry was employed immediately to verify tube in airway with appropriate color change indicating detection of CO2. Water vapor was seen within the ET tube, and auscultation of the abdomen revealed no bubbling souds. Auscultation and inspection of the chest after intubation showed symmetric chest excursion and symmetric air entry bilaterally. Chest X-ray has been ordered and is pending. The patient has been placed on a mechanical ventilator. Complications: Hypoxia, please refer to Rhiannon Davidson 53 note Guadalupe Perea DO 
[facility]

## 2021-03-11 NOTE — CARDIO/PULMONARY
I was called back to the room this afternoon to talk with the family, but on my arrival she had gone into another cardiac arrest.  We had her go through 1 round of ACLS with 2 mg of IV epinephrine in total and she did have ROSC. I had a long goals of care discussion with the family and they stated that they wanted to still go on, the patient's other daughter then arrived. The patient lost a pulse 2 more times this afternoon and on the last CODE BLUE, multiple rounds of chest compressions and epinephrine were not successful. I gave the family my condolences. Time of death was 1:40 PM. 
 
Rebecca Barahona DO 
[facility]

## 2021-03-11 NOTE — PROGRESS NOTES
Spiritual Care Assessment/Progress Note [address] 
 
 
NAME: Hector Barrett      MRN: [id number] AGE: 77 y.o. SEX: female Adventist Affiliation: [employer]  
Language: [place] 3/11/2021     Total Time (in minutes): 10 Spiritual Assessment begun in [facility] [place] ICU through conversation with: 
  
    [x]Patient        [] Family    [] Friend(s) Reason for Consult: Code Blue/99 Spiritual beliefs: (Please include comment if needed) 
   [] Identifies with a edison tradition:     
   [] Supported by a edison community:        
   [] Claims no spiritual orientation:       
   [] Seeking spiritual identity:            
   [] Adheres to an individual form of spirituality:       
   [x] Not able to assess:                   
 
    
Identified resources for coping:  
   [] Prayer                           
   [] Music                  [] Guided Imagery 
   [] Family/friends                 [] Pet visits [] Devotional reading                         [x] Unknown 
   [] Other:                                       
 
 
Interventions offered during this visit: (See comments for more details) Patient Interventions: Other (comment)(Presence) Family/Friend(s): Affirmation of emotions/emotional suffering, Bridging, Catharsis/review of pertinent events in supportive environment, Coping skills reviewed/reinforced, Initial Assessment, Normalization of emotional/spiritual concerns, Prayer (assurance of) Plan of Care: 
 
 [] Support spiritual and/or cultural needs  
 [] Support AMD and/or advance care planning process    
 [] Support grieving process 
 [] Coordinate Rites and/or Rituals  
 [] Coordination with community clergy  [] No spiritual needs identified at this time 
 [] Detailed Plan of Care below (See Comments)  [] Make referral to Music Therapy 
[] Make referral to Pet Therapy    
[] Make referral to Addiction services 
[] Make referral to [facility] 
[] Make referral to Spiritual Care Partner 
[] No future visits requested       
[x] Follow up upon further referrals Comments:  responded to code blue in the ICU. Staff were attending to pt, no family present.  is available for support upon further referrals. Visited by: Lauren Day.  can be reached by calling the  at [facility] 
[phone]

## 2021-03-11 NOTE — PROGRESS NOTES
responded to a second code blue at the ICU. Whiles on the unit, pt coded multiple times. Staff continued to care for pt. Her family;  and daughter were present, a second daughter later joined.  stayed with family, offering supportive presence and empathy. [address] continues to be a supportive presence when physician updated family on state of events. When pt was pronounce dead,  was still present with family, facilitating life review and grief. Pt's , Rosalina Crowley, shared family stories, stating they've been  for over 21 years. He stated his wife was a rock, loved by many. He indicated it was hard for him to bear the situation at this moment, yet he was holding on to edison, which they both shared, together with their children. Pt's daughter , Benjamin Mathur, also engaged  in conversation, sharing her last serious conversation with mom, including her wishes about her burial. [address] offered affirmation, spoke words of comfort and offered condolences.  provided space for family to process current circumstance, informing them of 's availability as needed. Family thanked  for the support. Visited by: Manuel Diaz.  can be reached by calling the  at [facility] 
[phone]

## 2021-03-11 NOTE — PROGRESS NOTES
Renal progress Note NAME:  Harvey Washington :   1955 MRN:   [id number] Date/Time:  3/11/2021 1:08 PM 
 
 
Subjective:  
Patient seen  in ICU, on bipap, anxious and tachpnic No edema, improved hemodynamic status Objective: VITALS:   
Visit Vitals BP (!) 152/64 (BP 1 Location: Right lower arm) Pulse 96 Temp 98.3 °F (36.8 °C) Resp (!) 31 Ht 5' 3\" (1.6 m) Wt 91.1 kg (200 lb 13.4 oz) SpO2 (!) 88% BMI 35.58 kg/m² Temp (24hrs), Av.6 °F (37 °C), Min:98.3 °F (36.8 °C), Max:98.9 °F (37.2 °C) PHYSICAL EXAM:  
General: alert awake, on bipap Anxious HEENT: EOMI, no Icterus, no Pallor, pupils reactive, Neck: Neck is supple, No JVD, no thyromegaly,  
Lungs:decreased BS left base,, few rales+ CVS: heart sounds normal, regular rate and rhythm, no murmurs, no rubs. GI: soft, nontender, normal BS, Extremeties: no cyanosis, no edema, Neuro: Alert, awake, speech is normal ,  
Skin: normal skin turgor, no skin rashes LAB DATA REVIEWED:   
Recent Results (from the past 24 hour(s)) GLUCOSE, POC Collection Time: 03/10/21  3:34 PM  
Result Value Ref Range Glucose (POC) 168 (H) 65 - 100 mg/dL Performed by Moraima Nelson, POC Collection Time: 03/10/21  9:08 PM  
Result Value Ref Range Glucose (POC) 233 (H) 65 - 100 mg/dL Performed by Kristopher Padilla   
CBC WITH AUTOMATED DIFF Collection Time: 21  5:17 AM  
Result Value Ref Range WBC 30.9 (H) 3.6 - 11.0 K/uL  
 RBC 2.63 (L) 3.80 - 5.20 M/uL HGB 7.9 (L) 11.5 - 16.0 g/dL HCT 24.4 (L) 35.0 - 47.0 % MCV 92.8 80.0 - 99.0 FL  
 MCH 30.0 26.0 - 34.0 PG  
 MCHC 32.4 30.0 - 36.5 g/dL  
 RDW 15.7 (H) 11.5 - 14.5 % PLATELET 420 524 - 243 K/uL MPV 11.4 8.9 - 12.9 FL  
 NEUTROPHILS 91 (H) 32 - 75 % LYMPHOCYTES 3 (L) 12 - 49 % MONOCYTES 5 5 - 13 % EOSINOPHILS 0 0 - 7 % BASOPHILS 0 0 - 1 % IMMATURE GRANULOCYTES 1 (H) 0.0 - 0.5 % ABS.  NEUTROPHILS 28.4 (H) 1.8 - 8.0 K/UL ABS. LYMPHOCYTES 1.1 0.8 - 3.5 K/UL  
 ABS. MONOCYTES 1.5 (H) 0.0 - 1.0 K/UL  
 ABS. EOSINOPHILS 0.0 0.0 - 0.4 K/UL  
 ABS. BASOPHILS 0.0 0.0 - 0.1 K/UL  
 ABS. IMM. GRANS. 0.2 (H) 0.00 - 0.04 K/UL  
 DF AUTOMATED    
C REACTIVE PROTEIN, QT Collection Time: 03/11/21  5:17 AM  
Result Value Ref Range C-Reactive protein 29.00 (H) 0.00 - 0.60 mg/dL PROCALCITONIN Collection Time: 03/11/21  5:17 AM  
Result Value Ref Range Procalcitonin 17.00 (H) 0 ng/mL METABOLIC PANEL, BASIC Collection Time: 03/11/21  5:17 AM  
Result Value Ref Range Sodium 136 136 - 145 mmol/L Potassium 3.7 3.5 - 5.1 mmol/L Chloride 99 97 - 108 mmol/L  
 CO2 25 21 - 32 mmol/L Anion gap 12 5 - 15 mmol/L Glucose 244 (H) 65 - 100 mg/dL BUN 40 (H) 6 - 20 mg/dL Creatinine 4.79 (H) 0.55 - 1.02 mg/dL BUN/Creatinine ratio 8 (L) 12 - 20 GFR est AA 11 (L) >60 ml/min/1.73m2 GFR est non-AA 9 (L) >60 ml/min/1.73m2 Calcium 9.0 8.5 - 10.1 mg/dL BLOOD GAS, ARTERIAL Collection Time: 03/11/21  5:17 AM  
Result Value Ref Range pH 7.36 7.35 - 7.45    
 PCO2 42 35 - 45 mmHg PO2 83 75 - 100 mmHg O2 SAT 95 (L) >95 % BICARBONATE 23 22 - 26 mmol/L  
 BASE DEFICIT 2.1 (H) 0 - 2 mmol/L  
 O2 METHOD BiPAP    
 FIO2 90.0 % Tidal volume 550 SET RATE 18    
 EPAP/CPAP/PEEP 10 SITE Right Radial    
 ARLET'S TEST PASS    
GLUCOSE, POC Collection Time: 03/11/21  7:31 AM  
Result Value Ref Range Glucose (POC) 278 (H) 65 - 100 mg/dL Performed by [email or web address] Recommendations/Plan:  
#1 ESRD: on HD at [facility] every TTS 
--No uremia, SOB is probably related to her pneumonia, clinically no evidence of fluid overload,  
Continue antibiotics as per ID Will repeat HD on Friday 2. Hyperkalemia: Secondary to end-stage renal disease Continue renal diet Improved and stable #3 renal osteodystrophy 
-Not noted to be on any phosphorus binders.    
 will check phosphorus with next labs. Obtain PTH from outpatient and continue Zemplar if indicated 
-Calcium is okay #3 anemia acute on chronic 
-hemoglobin yesterday was 6.9, -She received blood and repeat Hb is 8.4 
-Continue Procrit with dialysis #4 sepsis: Persistent MRSA bacteremia ID following, continue IV antibiotics as per ID Persistent bacteremia: multiple BCs since 2/25 are postive, lateest from 3/6 and 3/8 are positive Highly suspicious for AVG infection Patient had previous episode of infected AV graft requiring removal 
She may need graft removal again , Dr. Lidia Lazaro following Recommend to avoid AV graft use for now as patient is not tolerating hemodialysis possibly from bacteremia Continue to use triple-lumen temporary hemodialysis catheter for hemodialysis and also to maintain IV access #5 hypertension 
-amlodipine and hydralazine were discontinued #6 hyponatremia 
-Sodium stable at 134 
-Will plan dialysis with high sodium bath. -Restrict fluid intake 7. Acute hypoxic respiratory failure: Placed on BiPAP , may need intubation and pulm following Left lower lobe dense infiltrate present, continue antibiotics Pulmonary and ID following closely 
 
 
________________________________________________________________________ Signed: Dale Araujo MD

## 2021-03-16 LAB
BACTERIA SPEC CULT: NORMAL
SPECIAL REQUESTS,SREQ: NORMAL

## 2021-03-19 NOTE — DISCHARGE SUMMARY
Patient sepsis secondary to MRSA bacteremia thought to be secondary to infected AV fistula, of note patient had a prolonged complicated admission course, during the course of the admission patient developed acute respiratory failure with hypoxia secondary to bilateral pneumonia likely secondary, with complicating factor of having ESRD on dialysis, patient was in the MICU multiple losses of pulse, ACLS protocol was run by pulmonary critical care attending, after several losses of pulse and performing ACLS protocol a  decision was made to stop ACLS protocol and call time of death, I agree with assessment and plan as per pulmonary critical care time patient ultimately  at time of death called according to chart

## 2023-09-28 NOTE — PROGRESS NOTES
Dr. Mcgill Shown in to see patient new order for 80 mg IV lasix stat, chest xray, kub on abdomen , hold am dose of amolotipine, atenolol, hydralazine, stat tessalon pearls 100mg po tid prn. Dr. Mcgill Shown and  This nurse spoke with blood bank to make them aware of stopping the blood. This is not consider a blood transfusing reaction. Non-Graft Cartilage Fenestration Text: The cartilage was fenestrated with a 2mm punch biopsy to help facilitate healing.

## 2025-06-09 NOTE — CARDIO/PULMONARY
Patient seen and examined in her room this morning, no acute events overnight except her worsening hypoxia requiring BiPAP support with an AVAPS setting of 550/18/90%/10, on my evaluation this morning she is on 100% FiO2 and saturating 89%. I discussed with the patient again my recommendation is to not pursue intubation. The patient was successfully intubated without an apparent issue periprocedurally, however we had a very difficult time obtaining accurate O2 sats therefore we switched to her finger and her toe and again no significant improvement. She slowly became more bradycardic and then eventually hypotensive and eventually she lost a pulse. A CODE BLUE was called and chest compressions were started. She underwent 1 round of ACLS  with 2 total milligrams of IV epinephrine, 2 A of sodium bicarb, and eventually had ROSC. The patient then lost the pulse her Levophed during the CODE BLUE was turned up to 100 mcg/min and after ROSC we slowly brought that back down after her blood pressure had significantly improved into the two hundreds. Her heart rate also went up into the one fifties to one seventies and was in atrial fibrillation with RVR. Therefore we gave 150 mg IV amiodarone. About 10 minutes later, she became very bradycardic and hypotensive again, Levophed was restarted, however she  lost her pulse once more. We gave 2 more milligrams of epinephrine during chest compressions and 2 more amps of sodium bicarbonate and had ROSC again. We then at that point felt that the ET tube might be a little bit deep, therefore we did retract the ET tube two cm to 23 cm at the lip and her tidal volumes went from the 250's up to the 350's on the ventilator which was currently set at AC/PC 22/30/100%/12. After her second [address], I called the patient's daughter, Reza Eaton.   I let her know that her mother was not doing very well and that I thought that it was highly likely that she would code again within the next 1 to 24 hours. She states that she will be coming up to the hospital now and she would like her mother to remain full code but to call her if anything else happens again. A chest x-ray was obtained and showed that her ET tube was still in the right mainstem, therefore we retracted the ET tube 3 cm. Her tidal volumes did improve again we then increased her PEEP to 20 and reduced her PC to 18. Unfortunately, she then had another cardiac arrest, her Levophed was increased, chest compressions were started, and she received 1 mg of IV epinephrine along with another amp of sodium bicarbonate. We will go ahead and start her on a sodium bicarbonate drip, but I did call the daughter and let her know that she coded again and that I did not think that she would survive this hospitalization. She states she still wanted everything done until she could come to the hospital to see her mother. I discussed the case with anesthesia and the primary team throughout this event. I provided immediate personal care throughout all of the codes. 70 minutes critical care time Home

## (undated) DEVICE — Device

## (undated) DEVICE — 450 ML BOTTLE OF 0.05% CHLORHEXIDINE GLUCONATE IN 99.95% STERILE WATER FOR IRRIGATION, USP AND APPLICATOR.: Brand: IRRISEPT ANTIMICROBIAL WOUND LAVAGE

## (undated) DEVICE — NEEDLE HYPO 18GA L1.5IN PNK S STL HUB POLYPR SHLD REG BVL

## (undated) DEVICE — LABEL MED CARD MRMC STRL

## (undated) DEVICE — SUTURE PERMAHAND SZ 3-0 L30IN NONABSORBABLE BLK SILK BRAID A304H

## (undated) DEVICE — AGENT HEMSTAT W4XL4IN OXIDIZED REGENERATED CELOS ABSRB SFT

## (undated) DEVICE — TOWEL SURG W17XL27IN STD BLU COT NONFENESTRATED PREWASHED

## (undated) DEVICE — SUTURE PROL SZ 6-0 L24IN NONABSORBABLE BLU L13MM C-1 3/8 8726H

## (undated) DEVICE — COVER,MAYO STAND,STERILE: Brand: MEDLINE

## (undated) DEVICE — DRAPE XR C ARM 41X74IN LF --

## (undated) DEVICE — HANDLE LT SNAP ON ULT DURABLE LENS FOR TRUMPF ALC DISPOSABLE

## (undated) DEVICE — RADIFOCUS TORQUE DEVICE MULTI-TORQUE VISE: Brand: RADIFOCUS TORQUE DEVICE

## (undated) DEVICE — BLADE ASSEMB CLP HAIR FINE --

## (undated) DEVICE — STERILE POLYISOPRENE POWDER-FREE SURGICAL GLOVES: Brand: PROTEXIS

## (undated) DEVICE — DERMABOND SKIN ADH 0.7ML -- DERMABOND ADVANCED 12/BX

## (undated) DEVICE — INFECTION CONTROL KIT SYS

## (undated) DEVICE — GDWIRE ZIPWIRE 0.035INX180CM --

## (undated) DEVICE — PROBE VASC 8MHZ WTRPRF

## (undated) DEVICE — REM POLYHESIVE ADULT PATIENT RETURN ELECTRODE: Brand: VALLEYLAB

## (undated) DEVICE — DRAPE PRB US TRNSDCR 6X96IN --

## (undated) DEVICE — RING BASIN GUIDEWIRE BOWL: Brand: MEDLINE INDUSTRIES, INC.

## (undated) DEVICE — INFLATION DEVICE: Brand: ENCORE™ 26

## (undated) DEVICE — SUTURE VCRL SZ 3-0 L27IN ABSRB UD L26MM SH 1/2 CIR J416H

## (undated) DEVICE — CATH SUPP SNGL 0.035INX90CM -- TRAILBLAZER - ORDER BY PK/5

## (undated) DEVICE — BANDAGE,GAUZE,BULKEE II,4.5"X4.1YD,STRL: Brand: MEDLINE

## (undated) DEVICE — SUT PROL 6-0 24IN BV1 DA BLU --

## (undated) DEVICE — SPONGE GZ W4XL4IN COT 12 PLY TYP VII WVN C FLD DSGN

## (undated) DEVICE — INTRODUCER SHTH 6FR CANN L23CM DIL TIP 35MM GRN TUNGSTEN

## (undated) DEVICE — DESTINATION RENAL GUIDING SHEATH: Brand: DESTINATION

## (undated) DEVICE — SOLUTION IV 500ML 0.9% SOD CHL FLX CONT

## (undated) DEVICE — SUTURE PERMAHAND SZ 2-0 L30IN NONABSORBABLE BLK SILK W/O A305H

## (undated) DEVICE — SUTURE MCRYL SZ 4-0 L27IN ABSRB UD L19MM PS-2 1/2 CIR PRIM Y426H

## (undated) DEVICE — SOLUTION IV 1000ML 0.9% SOD CHL

## (undated) DEVICE — PREP SKN CHLRAPRP APL 26ML STR --

## (undated) DEVICE — STRAP,POSITIONING,KNEE/BODY,FOAM,4X60": Brand: MEDLINE

## (undated) DEVICE — PTA BALLOON DILATATION CATHETER: Brand: MUSTANG™

## (undated) DEVICE — SPONGE GZ W4XL4IN COT RADPQ HIGHLY ABSRB

## (undated) DEVICE — RADIFOCUS GLIDEWIRE: Brand: GLIDEWIRE